# Patient Record
Sex: MALE | Race: BLACK OR AFRICAN AMERICAN | NOT HISPANIC OR LATINO | Employment: OTHER | URBAN - METROPOLITAN AREA
[De-identification: names, ages, dates, MRNs, and addresses within clinical notes are randomized per-mention and may not be internally consistent; named-entity substitution may affect disease eponyms.]

---

## 2017-05-11 ENCOUNTER — TRANSCRIBE ORDERS (OUTPATIENT)
Dept: LAB | Facility: CLINIC | Age: 27
End: 2017-05-11

## 2017-05-11 ENCOUNTER — APPOINTMENT (OUTPATIENT)
Dept: LAB | Facility: CLINIC | Age: 27
End: 2017-05-11
Payer: COMMERCIAL

## 2017-05-11 DIAGNOSIS — Z79.899 ENCOUNTER FOR LONG-TERM (CURRENT) USE OF OTHER MEDICATIONS: ICD-10-CM

## 2017-05-11 DIAGNOSIS — Z79.899 ENCOUNTER FOR LONG-TERM (CURRENT) USE OF OTHER MEDICATIONS: Primary | ICD-10-CM

## 2017-05-11 LAB
ALBUMIN SERPL BCP-MCNC: 3.9 G/DL (ref 3.5–5)
ALP SERPL-CCNC: 89 U/L (ref 46–116)
ALT SERPL W P-5'-P-CCNC: 16 U/L (ref 12–78)
ANION GAP SERPL CALCULATED.3IONS-SCNC: 5 MMOL/L (ref 4–13)
AST SERPL W P-5'-P-CCNC: 14 U/L (ref 5–45)
BASOPHILS # BLD AUTO: 0.01 THOUSANDS/ΜL (ref 0–0.1)
BASOPHILS NFR BLD AUTO: 0 % (ref 0–1)
BILIRUB SERPL-MCNC: 0.54 MG/DL (ref 0.2–1)
BUN SERPL-MCNC: 6 MG/DL (ref 5–25)
CALCIUM SERPL-MCNC: 9.3 MG/DL (ref 8.3–10.1)
CHLORIDE SERPL-SCNC: 102 MMOL/L (ref 100–108)
CHOLEST SERPL-MCNC: 146 MG/DL (ref 50–200)
CO2 SERPL-SCNC: 33 MMOL/L (ref 21–32)
CREAT SERPL-MCNC: 1 MG/DL (ref 0.6–1.3)
EOSINOPHIL # BLD AUTO: 0.22 THOUSAND/ΜL (ref 0–0.61)
EOSINOPHIL NFR BLD AUTO: 4 % (ref 0–6)
ERYTHROCYTE [DISTWIDTH] IN BLOOD BY AUTOMATED COUNT: 12.8 % (ref 11.6–15.1)
GFR SERPL CREATININE-BSD FRML MDRD: >60 ML/MIN/1.73SQ M
GLUCOSE P FAST SERPL-MCNC: 80 MG/DL (ref 65–99)
HCT VFR BLD AUTO: 48 % (ref 36.5–49.3)
HDLC SERPL-MCNC: 49 MG/DL (ref 40–60)
HGB BLD-MCNC: 17.2 G/DL (ref 12–17)
LDLC SERPL CALC-MCNC: 77 MG/DL (ref 0–100)
LYMPHOCYTES # BLD AUTO: 2.76 THOUSANDS/ΜL (ref 0.6–4.47)
LYMPHOCYTES NFR BLD AUTO: 53 % (ref 14–44)
MCH RBC QN AUTO: 32.1 PG (ref 26.8–34.3)
MCHC RBC AUTO-ENTMCNC: 35.8 G/DL (ref 31.4–37.4)
MCV RBC AUTO: 90 FL (ref 82–98)
MONOCYTES # BLD AUTO: 0.68 THOUSAND/ΜL (ref 0.17–1.22)
MONOCYTES NFR BLD AUTO: 13 % (ref 4–12)
NEUTROPHILS # BLD AUTO: 1.55 THOUSANDS/ΜL (ref 1.85–7.62)
NEUTS SEG NFR BLD AUTO: 30 % (ref 43–75)
NRBC BLD AUTO-RTO: 0 /100 WBCS
PLATELET # BLD AUTO: 203 THOUSANDS/UL (ref 149–390)
PMV BLD AUTO: 10.8 FL (ref 8.9–12.7)
POTASSIUM SERPL-SCNC: 4.8 MMOL/L (ref 3.5–5.3)
PROT SERPL-MCNC: 7.6 G/DL (ref 6.4–8.2)
RBC # BLD AUTO: 5.35 MILLION/UL (ref 3.88–5.62)
SODIUM SERPL-SCNC: 140 MMOL/L (ref 136–145)
T4 SERPL-MCNC: 7.6 UG/DL (ref 4.7–13.3)
TRIGL SERPL-MCNC: 98 MG/DL
TSH SERPL DL<=0.05 MIU/L-ACNC: 1.12 UIU/ML (ref 0.36–3.74)
VALPROATE SERPL-MCNC: 84 UG/ML (ref 50–100)
WBC # BLD AUTO: 5.23 THOUSAND/UL (ref 4.31–10.16)

## 2017-05-11 PROCEDURE — 80061 LIPID PANEL: CPT

## 2017-05-11 PROCEDURE — 85025 COMPLETE CBC W/AUTO DIFF WBC: CPT

## 2017-05-11 PROCEDURE — 36415 COLL VENOUS BLD VENIPUNCTURE: CPT

## 2017-05-11 PROCEDURE — 84436 ASSAY OF TOTAL THYROXINE: CPT

## 2017-05-11 PROCEDURE — 84443 ASSAY THYROID STIM HORMONE: CPT

## 2017-05-11 PROCEDURE — 80053 COMPREHEN METABOLIC PANEL: CPT

## 2017-05-11 PROCEDURE — 80164 ASSAY DIPROPYLACETIC ACD TOT: CPT

## 2017-10-03 ENCOUNTER — APPOINTMENT (OUTPATIENT)
Dept: LAB | Facility: CLINIC | Age: 27
End: 2017-10-03
Payer: COMMERCIAL

## 2017-10-03 ENCOUNTER — TRANSCRIBE ORDERS (OUTPATIENT)
Dept: LAB | Facility: CLINIC | Age: 27
End: 2017-10-03

## 2017-10-03 DIAGNOSIS — Z79.899 ENCOUNTER FOR LONG-TERM (CURRENT) USE OF OTHER MEDICATIONS: ICD-10-CM

## 2017-10-03 DIAGNOSIS — Z79.899 ENCOUNTER FOR LONG-TERM (CURRENT) USE OF OTHER MEDICATIONS: Primary | ICD-10-CM

## 2017-10-03 LAB
ALBUMIN SERPL BCP-MCNC: 3.9 G/DL (ref 3.5–5)
ALP SERPL-CCNC: 85 U/L (ref 46–116)
ALT SERPL W P-5'-P-CCNC: 11 U/L (ref 12–78)
ANION GAP SERPL CALCULATED.3IONS-SCNC: 5 MMOL/L (ref 4–13)
AST SERPL W P-5'-P-CCNC: 12 U/L (ref 5–45)
BASOPHILS # BLD AUTO: 0.01 THOUSANDS/ΜL (ref 0–0.1)
BASOPHILS NFR BLD AUTO: 0 % (ref 0–1)
BILIRUB SERPL-MCNC: 0.5 MG/DL (ref 0.2–1)
BUN SERPL-MCNC: 6 MG/DL (ref 5–25)
CALCIUM SERPL-MCNC: 9.5 MG/DL (ref 8.3–10.1)
CHLORIDE SERPL-SCNC: 101 MMOL/L (ref 100–108)
CO2 SERPL-SCNC: 32 MMOL/L (ref 21–32)
CREAT SERPL-MCNC: 0.94 MG/DL (ref 0.6–1.3)
EOSINOPHIL # BLD AUTO: 0.1 THOUSAND/ΜL (ref 0–0.61)
EOSINOPHIL NFR BLD AUTO: 2 % (ref 0–6)
ERYTHROCYTE [DISTWIDTH] IN BLOOD BY AUTOMATED COUNT: 12.5 % (ref 11.6–15.1)
GFR SERPL CREATININE-BSD FRML MDRD: 127 ML/MIN/1.73SQ M
GLUCOSE P FAST SERPL-MCNC: 82 MG/DL (ref 65–99)
HCT VFR BLD AUTO: 47.6 % (ref 36.5–49.3)
HGB BLD-MCNC: 17.4 G/DL (ref 12–17)
LYMPHOCYTES # BLD AUTO: 2.27 THOUSANDS/ΜL (ref 0.6–4.47)
LYMPHOCYTES NFR BLD AUTO: 52 % (ref 14–44)
MCH RBC QN AUTO: 32.6 PG (ref 26.8–34.3)
MCHC RBC AUTO-ENTMCNC: 36.6 G/DL (ref 31.4–37.4)
MCV RBC AUTO: 89 FL (ref 82–98)
MONOCYTES # BLD AUTO: 0.49 THOUSAND/ΜL (ref 0.17–1.22)
MONOCYTES NFR BLD AUTO: 11 % (ref 4–12)
NEUTROPHILS # BLD AUTO: 1.56 THOUSANDS/ΜL (ref 1.85–7.62)
NEUTS SEG NFR BLD AUTO: 35 % (ref 43–75)
NRBC BLD AUTO-RTO: 0 /100 WBCS
PLATELET # BLD AUTO: 229 THOUSANDS/UL (ref 149–390)
PMV BLD AUTO: 11.6 FL (ref 8.9–12.7)
POTASSIUM SERPL-SCNC: 4.3 MMOL/L (ref 3.5–5.3)
PROT SERPL-MCNC: 7.7 G/DL (ref 6.4–8.2)
RBC # BLD AUTO: 5.34 MILLION/UL (ref 3.88–5.62)
SODIUM SERPL-SCNC: 138 MMOL/L (ref 136–145)
VALPROATE SERPL-MCNC: 95 UG/ML (ref 50–100)
WBC # BLD AUTO: 4.44 THOUSAND/UL (ref 4.31–10.16)

## 2017-10-03 PROCEDURE — 80164 ASSAY DIPROPYLACETIC ACD TOT: CPT

## 2017-10-03 PROCEDURE — 80053 COMPREHEN METABOLIC PANEL: CPT

## 2017-10-03 PROCEDURE — 85025 COMPLETE CBC W/AUTO DIFF WBC: CPT

## 2017-10-03 PROCEDURE — 36415 COLL VENOUS BLD VENIPUNCTURE: CPT

## 2017-11-02 ENCOUNTER — TRANSCRIBE ORDERS (OUTPATIENT)
Dept: LAB | Facility: CLINIC | Age: 27
End: 2017-11-02

## 2017-11-02 ENCOUNTER — APPOINTMENT (OUTPATIENT)
Dept: LAB | Facility: CLINIC | Age: 27
End: 2017-11-02
Payer: COMMERCIAL

## 2017-11-02 DIAGNOSIS — Z79.899 ENCOUNTER FOR LONG-TERM (CURRENT) USE OF MEDICATIONS: Primary | ICD-10-CM

## 2017-11-02 DIAGNOSIS — R53.83 TIRED: Primary | ICD-10-CM

## 2017-11-02 DIAGNOSIS — R53.83 TIRED: ICD-10-CM

## 2017-11-02 LAB
ALBUMIN SERPL BCP-MCNC: 3.7 G/DL (ref 3.5–5)
ALP SERPL-CCNC: 83 U/L (ref 46–116)
ALT SERPL W P-5'-P-CCNC: 16 U/L (ref 12–78)
ANION GAP SERPL CALCULATED.3IONS-SCNC: 3 MMOL/L (ref 4–13)
AST SERPL W P-5'-P-CCNC: 14 U/L (ref 5–45)
BASOPHILS # BLD AUTO: 0.01 THOUSANDS/ΜL (ref 0–0.1)
BASOPHILS NFR BLD AUTO: 0 % (ref 0–1)
BILIRUB SERPL-MCNC: 0.3 MG/DL (ref 0.2–1)
BUN SERPL-MCNC: 5 MG/DL (ref 5–25)
CALCIUM SERPL-MCNC: 9.7 MG/DL (ref 8.3–10.1)
CHLORIDE SERPL-SCNC: 104 MMOL/L (ref 100–108)
CHOLEST SERPL-MCNC: 146 MG/DL (ref 50–200)
CO2 SERPL-SCNC: 34 MMOL/L (ref 21–32)
CREAT SERPL-MCNC: 0.96 MG/DL (ref 0.6–1.3)
EOSINOPHIL # BLD AUTO: 0.22 THOUSAND/ΜL (ref 0–0.61)
EOSINOPHIL NFR BLD AUTO: 4 % (ref 0–6)
ERYTHROCYTE [DISTWIDTH] IN BLOOD BY AUTOMATED COUNT: 12.4 % (ref 11.6–15.1)
GFR SERPL CREATININE-BSD FRML MDRD: 124 ML/MIN/1.73SQ M
GLUCOSE P FAST SERPL-MCNC: 97 MG/DL (ref 65–99)
HCT VFR BLD AUTO: 47.1 % (ref 36.5–49.3)
HDLC SERPL-MCNC: 51 MG/DL (ref 40–60)
HGB BLD-MCNC: 16.7 G/DL (ref 12–17)
LDLC SERPL CALC-MCNC: 69 MG/DL (ref 0–100)
LYMPHOCYTES # BLD AUTO: 2.84 THOUSANDS/ΜL (ref 0.6–4.47)
LYMPHOCYTES NFR BLD AUTO: 48 % (ref 14–44)
MCH RBC QN AUTO: 31.7 PG (ref 26.8–34.3)
MCHC RBC AUTO-ENTMCNC: 35.5 G/DL (ref 31.4–37.4)
MCV RBC AUTO: 90 FL (ref 82–98)
MONOCYTES # BLD AUTO: 0.72 THOUSAND/ΜL (ref 0.17–1.22)
MONOCYTES NFR BLD AUTO: 12 % (ref 4–12)
NEUTROPHILS # BLD AUTO: 2.1 THOUSANDS/ΜL (ref 1.85–7.62)
NEUTS SEG NFR BLD AUTO: 36 % (ref 43–75)
NRBC BLD AUTO-RTO: 0 /100 WBCS
PLATELET # BLD AUTO: 219 THOUSANDS/UL (ref 149–390)
PMV BLD AUTO: 10.8 FL (ref 8.9–12.7)
POTASSIUM SERPL-SCNC: 4.2 MMOL/L (ref 3.5–5.3)
PROT SERPL-MCNC: 7.3 G/DL (ref 6.4–8.2)
RBC # BLD AUTO: 5.26 MILLION/UL (ref 3.88–5.62)
SODIUM SERPL-SCNC: 141 MMOL/L (ref 136–145)
TRIGL SERPL-MCNC: 129 MG/DL
TSH SERPL DL<=0.05 MIU/L-ACNC: 1.2 UIU/ML (ref 0.36–3.74)
WBC # BLD AUTO: 5.9 THOUSAND/UL (ref 4.31–10.16)

## 2017-11-02 PROCEDURE — 80061 LIPID PANEL: CPT

## 2017-11-02 PROCEDURE — 84443 ASSAY THYROID STIM HORMONE: CPT

## 2017-11-02 PROCEDURE — 80053 COMPREHEN METABOLIC PANEL: CPT

## 2017-11-02 PROCEDURE — 85025 COMPLETE CBC W/AUTO DIFF WBC: CPT

## 2017-11-02 PROCEDURE — 36415 COLL VENOUS BLD VENIPUNCTURE: CPT

## 2017-12-19 ENCOUNTER — HOSPITAL ENCOUNTER (EMERGENCY)
Facility: HOSPITAL | Age: 27
Discharge: HOME | End: 2017-12-19
Attending: EMERGENCY MEDICINE
Payer: COMMERCIAL

## 2017-12-19 VITALS
RESPIRATION RATE: 18 BRPM | WEIGHT: 165 LBS | SYSTOLIC BLOOD PRESSURE: 145 MMHG | OXYGEN SATURATION: 96 % | HEART RATE: 97 BPM | TEMPERATURE: 98.3 F | DIASTOLIC BLOOD PRESSURE: 77 MMHG
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
## 2017-12-19 DIAGNOSIS — K62.89 RECTAL PAIN: ICD-10-CM

## 2017-12-19 LAB
ALBUMIN SERPL BCP-MCNC: 4.2 G/DL (ref 3.5–5)
ALP SERPL-CCNC: 98 U/L (ref 46–116)
ALT SERPL W P-5'-P-CCNC: 23 U/L (ref 12–78)
ANION GAP SERPL CALCULATED.3IONS-SCNC: 10 MMOL/L (ref 4–13)
AST SERPL W P-5'-P-CCNC: 24 U/L (ref 5–45)
BASOPHILS # BLD AUTO: 0.02 THOUSANDS/ΜL (ref 0–0.1)
BASOPHILS NFR BLD AUTO: 0 % (ref 0–1)
BILIRUB SERPL-MCNC: 0.43 MG/DL (ref 0.2–1)
BUN SERPL-MCNC: 14 MG/DL (ref 5–25)
CALCIUM SERPL-MCNC: 9.8 MG/DL (ref 8.3–10.1)
CHLORIDE SERPL-SCNC: 101 MMOL/L (ref 100–108)
CO2 SERPL-SCNC: 27 MMOL/L (ref 21–32)
CREAT SERPL-MCNC: 1.1 MG/DL (ref 0.6–1.3)
EOSINOPHIL # BLD AUTO: 0.11 THOUSAND/ΜL (ref 0–0.61)
EOSINOPHIL NFR BLD AUTO: 1 % (ref 0–6)
ERYTHROCYTE [DISTWIDTH] IN BLOOD BY AUTOMATED COUNT: 12.5 % (ref 11.6–15.1)
GFR SERPL CREATININE-BSD FRML MDRD: 105 ML/MIN/1.73SQ M
GLUCOSE SERPL-MCNC: 111 MG/DL (ref 65–140)
HCT VFR BLD AUTO: 46.2 % (ref 36.5–49.3)
HGB BLD-MCNC: 16.8 G/DL (ref 12–17)
HIV 1+2 AB+HIV1 P24 AG SERPL QL IA: NORMAL
HIV1 P24 AG SER QL: NORMAL
LYMPHOCYTES # BLD AUTO: 4.42 THOUSANDS/ΜL (ref 0.6–4.47)
LYMPHOCYTES NFR BLD AUTO: 52 % (ref 14–44)
MCH RBC QN AUTO: 32.4 PG (ref 26.8–34.3)
MCHC RBC AUTO-ENTMCNC: 36.4 G/DL (ref 31.4–37.4)
MCV RBC AUTO: 89 FL (ref 82–98)
MONOCYTES # BLD AUTO: 0.93 THOUSAND/ΜL (ref 0.17–1.22)
MONOCYTES NFR BLD AUTO: 11 % (ref 4–12)
NEUTROPHILS # BLD AUTO: 3.12 THOUSANDS/ΜL (ref 1.85–7.62)
NEUTS SEG NFR BLD AUTO: 36 % (ref 43–75)
NRBC BLD AUTO-RTO: 0 /100 WBCS
PLATELET # BLD AUTO: 251 THOUSANDS/UL (ref 149–390)
PMV BLD AUTO: 10.3 FL (ref 8.9–12.7)
POTASSIUM SERPL-SCNC: 4.3 MMOL/L (ref 3.5–5.3)
PROT SERPL-MCNC: 7.9 G/DL (ref 6.4–8.2)
RBC # BLD AUTO: 5.19 MILLION/UL (ref 3.88–5.62)
SODIUM SERPL-SCNC: 138 MMOL/L (ref 136–145)
WBC # BLD AUTO: 8.61 THOUSAND/UL (ref 4.31–10.16)

## 2017-12-19 PROCEDURE — 99284 EMERGENCY DEPT VISIT MOD MDM: CPT

## 2017-12-19 PROCEDURE — 36415 COLL VENOUS BLD VENIPUNCTURE: CPT | Performed by: EMERGENCY MEDICINE

## 2017-12-19 PROCEDURE — 86592 SYPHILIS TEST NON-TREP QUAL: CPT | Performed by: EMERGENCY MEDICINE

## 2017-12-19 PROCEDURE — 87591 N.GONORRHOEAE DNA AMP PROB: CPT | Performed by: EMERGENCY MEDICINE

## 2017-12-19 PROCEDURE — 80053 COMPREHEN METABOLIC PANEL: CPT | Performed by: EMERGENCY MEDICINE

## 2017-12-19 PROCEDURE — 87806 HIV AG W/HIV1&2 ANTB W/OPTIC: CPT | Performed by: EMERGENCY MEDICINE

## 2017-12-19 PROCEDURE — 87491 CHLMYD TRACH DNA AMP PROBE: CPT | Performed by: EMERGENCY MEDICINE

## 2017-12-19 PROCEDURE — 85025 COMPLETE CBC W/AUTO DIFF WBC: CPT | Performed by: EMERGENCY MEDICINE

## 2017-12-20 LAB
CHLAMYDIA DNA CVX QL NAA+PROBE: NORMAL
N GONORRHOEA DNA GENITAL QL NAA+PROBE: NORMAL
RPR SER QL: NORMAL
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
## 2018-01-01 NOTE — DISCHARGE INSTRUCTIONS
Rectal Pain   WHAT YOU NEED TO KNOW:   Rectal pain can be caused by a number of conditions, such as hemorrhoids, an abscess, trauma, or anal tear  Infection, muscle spasms, or anal intercourse can also cause rectal pain  DISCHARGE INSTRUCTIONS:   Medicines: You may need any of the following:  · NSAIDs , such as ibuprofen, help decrease swelling, pain, and fever  This medicine is available with or without a doctor's order  NSAIDs can cause stomach bleeding or kidney problems in certain people  If you take blood thinner medicine, always ask your healthcare provider if NSAIDs are safe for you  Always read the medicine label and follow directions  · Prescription pain medicine  may be given  Ask how to take this medicine safely  · Antibiotics  help treat or prevent a bacterial infection  · Bowel movement softeners  help soften your bowel movement  They help prevent straining and more damage to the area  · Take your medicine as directed  Contact your healthcare provider if you think your medicine is not helping or if you have side effects  Tell him or her if you are allergic to any medicine  Keep a list of the medicines, vitamins, and herbs you take  Include the amounts, and when and why you take them  Bring the list or the pill bottles to follow-up visits  Carry your medicine list with you in case of an emergency  Return to the emergency department if:   · You have severe pain  Contact your healthcare provider if:   · Your pain does not decrease after 1 to 2 days of treatment  · You cannot take the medicine prescribed for your condition  · You have questions or concerns about your condition or care  Take a sitz bath:  Fill a bathtub with 4 to 6 inches of warm water  You may also use a sitz bath pan that fits over a toilet  Sit in the sitz bath for 20 minutes  Do this 2 to 3 times a day, or as directed  The warm water can help decrease pain, muscle spasms, or swelling     Apply heat:  Apply a warm, moist compress on your anus for 20 to 30 minutes every 2 hours for as many days as directed  Heat helps decrease pain and muscle spasms  Eat high-fiber foods: This will help prevent constipation and soften your bowel movements  High-fiber foods include fruit, vegetables, whole-grain breads and cereals, and beans  A dietitian or healthcare provider can help you create a high-fiber meal plan  Drink liquids as directed: You may need to drink more liquid than usual to help soften your bowel movements  Ask how much liquid to drink each day and which liquids are best for you  Follow up with your healthcare provider as directed:  Write down your questions so you remember to ask them during your visits  © 2017 [facility] Information is for End User's use only and may not be sold, redistributed or otherwise used for commercial purposes  All illustrations and images included in CareNotes® are the copyrighted property of [employer]  or [employer]  The above information is an  only  It is not intended as medical advice for individual conditions or treatments  Talk to your doctor, nurse or pharmacist before following any medical regimen to see if it is safe and effective for you  normal (ped)...

## 2018-01-09 ENCOUNTER — APPOINTMENT (OUTPATIENT)
Dept: LAB | Facility: CLINIC | Age: 28
End: 2018-01-09
Payer: COMMERCIAL

## 2018-01-09 ENCOUNTER — TRANSCRIBE ORDERS (OUTPATIENT)
Dept: LAB | Facility: CLINIC | Age: 28
End: 2018-01-09

## 2018-01-09 DIAGNOSIS — Z79.899 NEED FOR PROPHYLACTIC CHEMOTHERAPY: Primary | ICD-10-CM

## 2018-01-09 LAB
ALBUMIN SERPL BCP-MCNC: 3.7 G/DL (ref 3.5–5)
ALP SERPL-CCNC: 95 U/L (ref 46–116)
ALT SERPL W P-5'-P-CCNC: 21 U/L (ref 12–78)
ANION GAP SERPL CALCULATED.3IONS-SCNC: 5 MMOL/L (ref 4–13)
AST SERPL W P-5'-P-CCNC: 15 U/L (ref 5–45)
BASOPHILS # BLD AUTO: 0.01 THOUSANDS/ΜL (ref 0–0.1)
BASOPHILS NFR BLD AUTO: 0 % (ref 0–1)
BILIRUB SERPL-MCNC: 0.44 MG/DL (ref 0.2–1)
BUN SERPL-MCNC: 11 MG/DL (ref 5–25)
CALCIUM SERPL-MCNC: 9.4 MG/DL (ref 8.3–10.1)
CHLORIDE SERPL-SCNC: 103 MMOL/L (ref 100–108)
CO2 SERPL-SCNC: 32 MMOL/L (ref 21–32)
CREAT SERPL-MCNC: 0.98 MG/DL (ref 0.6–1.3)
EOSINOPHIL # BLD AUTO: 0.17 THOUSAND/ΜL (ref 0–0.61)
EOSINOPHIL NFR BLD AUTO: 3 % (ref 0–6)
ERYTHROCYTE [DISTWIDTH] IN BLOOD BY AUTOMATED COUNT: 12.5 % (ref 11.6–15.1)
GFR SERPL CREATININE-BSD FRML MDRD: 121 ML/MIN/1.73SQ M
GLUCOSE P FAST SERPL-MCNC: 104 MG/DL (ref 65–99)
HCT VFR BLD AUTO: 45.1 % (ref 36.5–49.3)
HGB BLD-MCNC: 16.9 G/DL (ref 12–17)
LYMPHOCYTES # BLD AUTO: 2.48 THOUSANDS/ΜL (ref 0.6–4.47)
LYMPHOCYTES NFR BLD AUTO: 46 % (ref 14–44)
MCH RBC QN AUTO: 32.9 PG (ref 26.8–34.3)
MCHC RBC AUTO-ENTMCNC: 37.5 G/DL (ref 31.4–37.4)
MCV RBC AUTO: 88 FL (ref 82–98)
MONOCYTES # BLD AUTO: 0.5 THOUSAND/ΜL (ref 0.17–1.22)
MONOCYTES NFR BLD AUTO: 9 % (ref 4–12)
NEUTROPHILS # BLD AUTO: 2.25 THOUSANDS/ΜL (ref 1.85–7.62)
NEUTS SEG NFR BLD AUTO: 42 % (ref 43–75)
NRBC BLD AUTO-RTO: 0 /100 WBCS
PLATELET # BLD AUTO: 263 THOUSANDS/UL (ref 149–390)
PMV BLD AUTO: 10.8 FL (ref 8.9–12.7)
POTASSIUM SERPL-SCNC: 4.1 MMOL/L (ref 3.5–5.3)
PROT SERPL-MCNC: 7.4 G/DL (ref 6.4–8.2)
RBC # BLD AUTO: 5.14 MILLION/UL (ref 3.88–5.62)
SODIUM SERPL-SCNC: 140 MMOL/L (ref 136–145)
VALPROATE SERPL-MCNC: 82 UG/ML (ref 50–100)
WBC # BLD AUTO: 5.42 THOUSAND/UL (ref 4.31–10.16)

## 2018-01-09 PROCEDURE — 80164 ASSAY DIPROPYLACETIC ACD TOT: CPT

## 2018-01-09 PROCEDURE — 36415 COLL VENOUS BLD VENIPUNCTURE: CPT

## 2018-01-09 PROCEDURE — 85025 COMPLETE CBC W/AUTO DIFF WBC: CPT

## 2018-01-09 PROCEDURE — 80053 COMPREHEN METABOLIC PANEL: CPT

## 2018-03-08 ENCOUNTER — HOSPITAL ENCOUNTER (EMERGENCY)
Facility: HOSPITAL | Age: 28
Discharge: HOME | End: 2018-03-09
Attending: EMERGENCY MEDICINE
Payer: COMMERCIAL

## 2018-03-08 DIAGNOSIS — R45.4 ANGER REACTION: Primary | ICD-10-CM

## 2018-03-09 VITALS
BODY MASS INDEX: 23.71 KG/M2 | RESPIRATION RATE: 16 BRPM | TEMPERATURE: 97.8 F | HEART RATE: 69 BPM | WEIGHT: 170 LBS | DIASTOLIC BLOOD PRESSURE: 87 MMHG | SYSTOLIC BLOOD PRESSURE: 159 MMHG

## 2018-03-09 LAB
AMPHETAMINES SERPL QL SCN: NEGATIVE
BARBITURATES UR QL: NEGATIVE
BENZODIAZ UR QL: NEGATIVE
COCAINE UR QL: NEGATIVE
ETHANOL EXG-MCNC: 0 MG/DL
METHADONE UR QL: NEGATIVE
OPIATES UR QL SCN: NEGATIVE
PCP UR QL: NEGATIVE
THC UR QL: NEGATIVE

## 2018-03-09 PROCEDURE — 80307 DRUG TEST PRSMV CHEM ANLYZR: CPT | Performed by: EMERGENCY MEDICINE

## 2018-03-09 PROCEDURE — 82075 ASSAY OF BREATH ETHANOL: CPT | Performed by: EMERGENCY MEDICINE

## 2018-03-09 PROCEDURE — 99285 EMERGENCY DEPT VISIT HI MDM: CPT
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
## 2018-03-09 NOTE — DISCHARGE INSTRUCTIONS
You were given resources for anger management  Return to the ED if you develop thoughts of hurting yourself or others  Mood Disorders   WHAT YOU NEED TO KNOW:   A mood disorder, or affective disorder, is a condition that causes your mood or emotions to be out of control  Your mood can affect your personality and how you act  It can also affect how you feel about yourself and life in general   DISCHARGE INSTRUCTIONS:   Medicines:   · Medicines  can help control your moods  · Take your medicine as directed  Contact your healthcare provider if you think your medicine is not helping or if you have side effects  Tell him of her if you are allergic to any medicine  Keep a list of the medicines, vitamins, and herbs you take  Include the amounts, and when and why you take them  Bring the list or the pill bottles to follow-up visits  Carry your medicine list with you in case of an emergency  Follow up with your healthcare provider as directed: You may need to return for regular visits or blood tests  Write down your questions so you remember to ask them during your visits  Self-care:   · Try to get 6 to 8 hours of sleep each night  Contact your healthcare provider if you have trouble sleeping  · Manage your stress  Learn new ways to relax, such as deep breathing or meditation  · Talk to someone about how you feel  Join a support group  Talk to your healthcare provider, family, or friends about your feelings  Tell them about things that upset you  · Exercise regularly  Ask about the best exercise plan for you  Most healthcare providers recommend 30 minutes each day, 5 days a week  Exercise helps to lower stress and manage your moods  Contact your healthcare provider if:   · You are depressed  · You feel anxious or worried  · You begin to drink alcohol, or you drink more than usual     · You take illegal drugs  · You take medicines that are not prescribed to you      · Your medicine causes you to feel drowsy, keeps you awake, or affects how much you eat  · You have questions or concerns about your condition or care  Return to the emergency department if:   · You have severe depression  · You want to hurt yourself or others  © 2017 [address] [employer] Information is for End User's use only and may not be sold, redistributed or otherwise used for commercial purposes  All illustrations and images included in CareNotes® are the copyrighted property of [employer]  or [employer]  The above information is an  only  It is not intended as medical advice for individual conditions or treatments  Talk to your doctor, nurse or pharmacist before following any medical regimen to see if it is safe and effective for you
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
## 2018-03-15 ENCOUNTER — HOSPITAL ENCOUNTER (EMERGENCY)
Facility: HOSPITAL | Age: 28
Discharge: HOME | End: 2018-03-15
Admitting: EMERGENCY MEDICINE
Payer: COMMERCIAL

## 2018-03-15 ENCOUNTER — APPOINTMENT (EMERGENCY)
Dept: RADIOLOGY | Facility: HOSPITAL | Age: 28
End: 2018-03-15
Payer: COMMERCIAL

## 2018-03-15 VITALS
WEIGHT: 165 LBS | BODY MASS INDEX: 23.01 KG/M2 | SYSTOLIC BLOOD PRESSURE: 150 MMHG | DIASTOLIC BLOOD PRESSURE: 94 MMHG | HEART RATE: 82 BPM | OXYGEN SATURATION: 98 % | RESPIRATION RATE: 18 BRPM | TEMPERATURE: 97 F

## 2018-03-15 DIAGNOSIS — L03.90 CELLULITIS: ICD-10-CM

## 2018-03-15 DIAGNOSIS — S63.690A: Primary | ICD-10-CM

## 2018-03-15 PROCEDURE — 73140 X-RAY EXAM OF FINGER(S): CPT

## 2018-03-15 PROCEDURE — 99283 EMERGENCY DEPT VISIT LOW MDM: CPT

## 2018-03-15 RX ORDER — CEPHALEXIN 250 MG/1
CAPSULE ORAL
Qty: 40 CAPSULE | Refills: 0 | Status: SHIPPED | OUTPATIENT
Start: 2018-03-15 | End: 2018-03-25

## 2018-03-15 NOTE — DISCHARGE INSTRUCTIONS
Cellulitis   WHAT YOU NEED TO KNOW:   Cellulitis is a skin infection caused by bacteria  Cellulitis may go away on its own or you may need treatment  Your healthcare provider may draw a [place] around the outside edges of your cellulitis  If your cellulitis spreads, your healthcare provider will see it outside of the [place]  DISCHARGE INSTRUCTIONS:   Call 911 if:   · You have sudden trouble breathing or chest pain  Seek care immediately if:   · Your wound gets larger and more painful  · You feel a crackling under your skin when you touch it  · You have purple dots or bumps on your skin, or you see bleeding under your skin  · You have new swelling and pain in your legs  · The red, warm, swollen area gets larger  · You see red streaks coming from the infected area  Contact your healthcare provider if:   · You have a fever  · Your fever or pain does not go away or gets worse  · The area does not get smaller after 2 days of antibiotics  · Your skin is flaking or peeling off  · You have questions or concerns about your condition or care  Medicines:   · Antibiotics  help treat the bacterial infection  · NSAIDs , such as ibuprofen, help decrease swelling, pain, and fever  NSAIDs can cause stomach bleeding or kidney problems in certain people  If you take blood thinner medicine, always ask if NSAIDs are safe for you  Always read the medicine label and follow directions  Do not give these medicines to children under 10months of age without direction from your child's healthcare provider  · Acetaminophen  decreases pain and fever  It is available without a doctor's order  Ask how much to take and how often to take it  Follow directions  Read the labels of all other medicines you are using to see if they also contain acetaminophen, or ask your doctor or pharmacist  Acetaminophen can cause liver damage if not taken correctly   Do not use more than 4 grams (4,000 milligrams) total of acetaminophen in one day  · Take your medicine as directed  Contact your healthcare provider if you think your medicine is not helping or if you have side effects  Tell him or her if you are allergic to any medicine  Keep a list of the medicines, vitamins, and herbs you take  Include the amounts, and when and why you take them  Bring the list or the pill bottles to follow-up visits  Carry your medicine list with you in case of an emergency  Self-care:   · Elevate the area above the level of your heart  as often as you can  This will help decrease swelling and pain  Prop the area on pillows or blankets to keep it elevated comfortably  · Clean the area daily until the wound scabs over  Gently wash the area with soap and water  Pat dry  Use dressings as directed  · Place cool or warm, wet cloths on the area as directed  Use clean cloths and clean water  Leave it on the area until the cloth is room temperature  Pat the area dry with a clean, dry cloth  The cloths may help decrease pain  Prevent cellulitis:   · Do not scratch bug bites or areas of injury  You increase your risk for cellulitis by scratching these areas  · Do not share personal items, such as towels, clothing, and razors  · Clean exercise equipment  with germ-killing  before and after you use it  · Wash your hands often  Use soap and water  Wash your hands after you use the bathroom, change a child's diapers, or sneeze  Wash your hands before you prepare or eat food  Use lotion to prevent dry, cracked skin  · Wear pressure stockings as directed  You may be told to wear the stockings if you have peripheral edema  The stockings improve blood flow and decrease swelling  · Treat athlete's foot  This can help prevent the spread of a bacterial skin infection  Follow up with your healthcare provider within 3 days, or as directed: Your healthcare provider will check if your cellulitis is getting better   You may need different medicine  Write down your questions so you remember to ask them during your visits  © 2017 [address] Information is for End User's use only and may not be sold, redistributed or otherwise used for commercial purposes  All illustrations and images included in CareNotes® are the copyrighted property of [employer]  or [employer]  The above information is an  only  It is not intended as medical advice for individual conditions or treatments  Talk to your doctor, nurse or pharmacist before following any medical regimen to see if it is safe and effective for you

## 2018-03-24 ENCOUNTER — HOSPITAL ENCOUNTER (EMERGENCY)
Facility: HOSPITAL | Age: 28
Discharge: HOME | End: 2018-03-24
Attending: EMERGENCY MEDICINE | Admitting: EMERGENCY MEDICINE
Payer: COMMERCIAL

## 2018-03-24 VITALS
TEMPERATURE: 97.8 F | OXYGEN SATURATION: 96 % | WEIGHT: 170 LBS | DIASTOLIC BLOOD PRESSURE: 93 MMHG | SYSTOLIC BLOOD PRESSURE: 137 MMHG | HEART RATE: 94 BPM | RESPIRATION RATE: 18 BRPM | BODY MASS INDEX: 23.71 KG/M2

## 2018-03-24 DIAGNOSIS — R51.9 HEADACHE: Primary | ICD-10-CM

## 2018-03-24 PROCEDURE — 99283 EMERGENCY DEPT VISIT LOW MDM: CPT

## 2018-03-24 RX ORDER — ACETAMINOPHEN 325 MG/1
975 TABLET ORAL ONCE
Status: COMPLETED | OUTPATIENT
Start: 2018-03-24 | End: 2018-03-24

## 2018-03-24 RX ORDER — IBUPROFEN 600 MG/1
600 TABLET ORAL ONCE
Status: COMPLETED | OUTPATIENT
Start: 2018-03-24 | End: 2018-03-24

## 2018-03-24 RX ADMIN — ACETAMINOPHEN 975 MG: 325 TABLET, FILM COATED ORAL at 22:14

## 2018-03-24 RX ADMIN — IBUPROFEN 600 MG: 600 TABLET, FILM COATED ORAL at 22:14

## 2018-03-25 NOTE — ED PROVIDER NOTES
History  Chief Complaint   Patient presents with    Headache     pt c/o HA that has been going on for a couple days pt also c/o abd pain     19-year-old w/ history of mood disorder and depression presents for evaluation of a headache  Patient lives by himself but has a  within   Patient reportedly called EMS saying that he was suicidal   Upon arrival to ER patient denies being suicidal and said that he missed communicated and says that he just does not feel well and has a headache and a tummy ache  Abdominal pain is left lower quadrant  Denies any fevers, chills, nausea or vomiting  He has not tried taking medication  He has had similar headaches to this in the past             Prior to Admission Medications   Prescriptions Last Dose Informant Patient Reported? Taking? QUEtiapine (SEROquel) 200 mg tablet   Yes No   Sig: Take 200 mg by mouth  cephalexin (KEFLEX) 250 mg capsule   No No   Sig: One cap 4 times a day with food for 10 days   cloNIDine (CATAPRES) 0 1 mg tablet   Yes No   Sig: Take 0 1 mg by mouth  divalproex sodium (DEPAKOTE ER) 250 mg 24 hr tablet   Yes No   Sig: Take 250 mg by mouth  fluticasone (FLONASE) 50 mcg/act nasal spray   No No   Si spray into each nostril daily   pantoprazole (PROTONIX) 40 mg tablet   No No   Sig: Take 1 tablet (40 mg total) by mouth daily  venlafaxine (EFFEXOR-XR) 150 mg 24 hr capsule   Yes No   Sig: Take 150 mg by mouth  Facility-Administered Medications: None       Past Medical History:   Diagnosis Date    Asthma     Depression     Mood swings ([facility] [address] )        History reviewed  No pertinent surgical history  History reviewed  No pertinent family history  I have reviewed and agree with the history as documented      Social History   Substance Use Topics    Smoking status: Current Every Day Smoker     Packs/day: 0 20     Types: Cigarettes    Smokeless tobacco: Never Used    Alcohol use No        Review of Systems   Constitutional: Negative for chills and fever  HENT: Negative for congestion and sore throat  Eyes: Negative for pain and redness  Respiratory: Negative for shortness of breath and wheezing  Cardiovascular: Negative for chest pain and palpitations  Gastrointestinal: Positive for abdominal pain  Negative for diarrhea and vomiting  Endocrine: Negative for polydipsia and polyphagia  Genitourinary: Negative for dysuria and flank pain  Musculoskeletal: Negative for arthralgias and back pain  Skin: Negative for rash and wound  Neurological: Positive for headaches  Negative for seizures  Psychiatric/Behavioral: Negative for agitation and behavioral problems  All other systems reviewed and are negative  Physical Exam  ED Triage Vitals [03/24/18 2207]   Temperature Pulse Respirations Blood Pressure SpO2   97 8 °F (36 6 °C) 94 18 137/93 96 %      Temp Source Heart Rate Source Patient Position - Orthostatic VS BP Location FiO2 (%)   Oral Monitor Lying Right arm --      Pain Score       --           Orthostatic Vital Signs  Vitals:    03/24/18 2207   BP: 137/93   Pulse: 94   Patient Position - Orthostatic VS: Lying       Physical Exam   Constitutional: He is oriented to person, place, and time  He appears well-developed and well-nourished  No distress  HENT:   Head: Normocephalic and atraumatic  Right Ear: External ear normal    Left Ear: External ear normal    Mouth/Throat: Oropharynx is clear and moist    Eyes: Conjunctivae and EOM are normal  Pupils are equal, round, and reactive to light  Neck: Normal range of motion  Neck supple  No thyromegaly present  Cardiovascular: Normal rate, regular rhythm and normal heart sounds  No murmur heard  Pulmonary/Chest: Breath sounds normal  No respiratory distress  He has no wheezes  Abdominal: Soft  He exhibits no distension  There is no tenderness  There is no rebound and no guarding  Musculoskeletal: Normal range of motion  He exhibits no deformity  Neurological: He is alert and oriented to person, place, and time  No cranial nerve deficit  Skin: Skin is warm  He is not diaphoretic  No erythema  Psychiatric: He has a normal mood and affect  His behavior is normal    Nursing note and vitals reviewed  ED Medications  Medications   acetaminophen (TYLENOL) tablet 975 mg (975 mg Oral Given 3/24/18 2214)   ibuprofen (MOTRIN) tablet 600 mg (600 mg Oral Given 3/24/18 2214)       Diagnostic Studies  Results Reviewed     None                 No orders to display         Procedures  Procedures      Phone Consults  ED Phone Contact    ED Course  ED Course as of Mar 24 2315   Sat Mar 24, 2018   2310 Patient reports that he feels much better like to go home  Again reiterates that he is not suicidal                                 MDM  Number of Diagnoses or Management Options  Headache:   Diagnosis management comments: Impression:  Well-appearing patient with very mild headache  Denies suicidal ideation  Plan:  Symptomatic treatment, likely discharge home    CritCare Time    Disposition  Final diagnoses:   Headache     Time reflects when diagnosis was documented in both MDM as applicable and the Disposition within this note     Time User Action Codes Description Comment    3/24/2018 11:11 PM Lyla Parker Add [R51] Headache       ED Disposition     ED Disposition Condition Comment    Discharge  Amye Brissa discharge to home/self care      Condition at discharge: Good        Follow-up Information     Follow up With Specialties Details Why Contact Info Additional [address], CRNP Nurse Practitioner Schedule an appointment as soon as possible for a visit  [address] [facility] [address]        [address] [place] [address] Emergency Department Emergency Medicine  As needed, If symptoms worsen [address]  [phone]  ED, [address] [place] [address], [id number]        Patient's Medications   Discharge Prescriptions    No medications on file     No discharge procedures on file  ED Provider  Attending physically available and evaluated Macie Brumfield I managed the patient along with the ED Attending      Electronically Signed by         Mati Fox MD  03/24/18 7051

## 2018-03-25 NOTE — DISCHARGE INSTRUCTIONS
Acute Headache, Ambulatory Care   GENERAL INFORMATION:   An acute headache  is pain or discomfort that starts suddenly and gets worse quickly  The cause of an acute headache may not be known  It may be triggered by stress, fatigue, hormones, food, or trauma  Common related symptoms include the following:   · Fever    · Sinus pressure    · Loss of memory    · Nausea or vomiting    · Problems with your vision, such as watery or red eyes, loss of vision, or pain in bright light    · Stiff neck    · Tenderness of the head and neck area    · Trouble staying awake, or being less alert than usual     · Weakness or less energy  Seek immediate care for the following symptoms:   · Severe pain    · A headache that occurs after a blow to the head, a fall, or other trauma     · Confusion or forgetfulness    · Numbness on one side of your face or body  Treatment for an acute headache  may include medicine to decrease pain  You may also need biofeedback or cognitive behavioral therapy  Ask your healthcare provider about these and other treatments for an acute headache  Manage my symptoms:   · Apply heat  on your head for 20 to 30 minutes every 2 hours for as many days as directed  Heat helps decrease pain and muscle spasms  You may alternate heat and ice  · Apply ice  on your head for 15 to 20 minutes every hour or as directed  Use an ice pack, or put crushed ice in a plastic bag  Cover it with a towel  Ice helps decrease pain  · Relax your muscles  Lie down in a comfortable position and close your eyes  Relax your muscles slowly  Start at your toes and work your way up your body  · Keep a record of your headaches  Write down when your headaches start and stop  Include your symptoms and what you were doing when the headache began  Record what you ate or drank for 24 hours before the headache started  Describe the pain and where it hurts  Keep track of what you did to treat your headache and whether it worked    Follow up with your healthcare provider as directed:  Bring your headache record with you when you see your healthcare provider  Write down your questions so you remember to ask them during your visits  CARE AGREEMENT:   You have the right to help plan your care  Learn about your health condition and how it may be treated  Discuss treatment options with your caregivers to decide what care you want to receive  You always have the right to refuse treatment  The above information is an  only  It is not intended as medical advice for individual conditions or treatments  Talk to your doctor, nurse or pharmacist before following any medical regimen to see if it is safe and effective for you  © 2014 [address] is for End User's use only and may not be sold, redistributed or otherwise used for commercial purposes  All illustrations and images included in CareNotes® are the copyrighted property of A D A M , Inc  or [employer]

## 2018-03-25 NOTE — ED ATTENDING ATTESTATION
Tyrone Quinones DO, saw and evaluated the patient  I have discussed the patient with the resident/non-physician practitioner and agree with the resident's/non-physician practitioner's findings, Plan of Care, and MDM as documented in the resident's/non-physician practitioner's note, except where noted  All available labs and Radiology studies were reviewed  At this point I agree with the current assessment done in the Emergency Department  I have conducted an independent evaluation of this patient including a focused history and a physical exam     ED Note - Juliet Gibbons 33 y o  male MRN: [id number]  Unit/Bed#: Z4HB Encounter: [id number]    History of Present Illness   HPI  Juliet Gibbons is a 33 y o  male who presents for evaluation of mild malaise and a non-specific headache  No neck pain  No focal neurological deficits  No fever chills  No concern for carbon monoxide exposure  Initial concern for SI when called 911 but adamantly denies SI or HI  States it was a misunderstanding that he meant tell him that he just was not feeling well  Patient does live at home but does have personal caretakers  REVIEW OF SYSTEMS  See HPI for further details  12 systems reviewed and otherwise negative except as noted  Historical Information     PAST MEDICAL HISTORY  Past Medical History:   Diagnosis Date    Asthma     Depression     Mood swings ([facility] [address] )        FAMILY HISTORY  History reviewed  No pertinent family history  SOCIAL HISTORY  Social History     Social History    Marital status: Single     Spouse name: N/A    Number of children: N/A    Years of education: N/A     Social History Main Topics    Smoking status: Current Every Day Smoker     Packs/day: 0 20     Types: Cigarettes    Smokeless tobacco: Never Used    Alcohol use No    Drug use: No    Sexual activity: Not Asked     Other Topics Concern    None     Social History Narrative    None       SURGICAL HISTORY  History reviewed   No pertinent surgical history  Meds/Allergies     CURRENT MEDICATIONS  No current facility-administered medications for this encounter  Current Outpatient Prescriptions:     cephalexin (KEFLEX) 250 mg capsule, One cap 4 times a day with food for 10 days, Disp: 40 capsule, Rfl: 0    cloNIDine (CATAPRES) 0 1 mg tablet, Take 0 1 mg by mouth , Disp: , Rfl:     divalproex sodium (DEPAKOTE ER) 250 mg 24 hr tablet, Take 250 mg by mouth , Disp: , Rfl:     fluticasone (FLONASE) 50 mcg/act nasal spray, 1 spray into each nostril daily, Disp: 16 g, Rfl: 12    pantoprazole (PROTONIX) 40 mg tablet, Take 1 tablet (40 mg total) by mouth daily  , Disp: 30 tablet, Rfl: 0    QUEtiapine (SEROquel) 200 mg tablet, Take 200 mg by mouth , Disp: , Rfl:     venlafaxine (EFFEXOR-XR) 150 mg 24 hr capsule, Take 150 mg by mouth , Disp: , Rfl:     (Not in a hospital admission)    ALLERGIES  Allergies   Allergen Reactions    Milk-Related Compounds      Objective     PHYSICAL EXAM    VITAL SIGNS: Blood pressure 137/93, pulse 94, temperature 97 8 °F (36 6 °C), temperature source Oral, resp  rate 18, weight 77 1 kg (170 lb), SpO2 96 %      Constitutional:  Appears well developed and well nourished, no acute distress, non-toxic appearance   Eyes:  PERRL, EOMI, conjunctivae pink, sclerae non-icteric, no nystagmus, optic discs sharp/ no papilledema    HENT:  Normocephalic/Atraumatic, no rhinorrhea, mucous membranes moist  Neck: normal range of motion, no tenderness, supple   Respiratory:  No respiratory distress, normal breath sounds,  Cardiovascular:  Normal rate, normal rhythm   GI:  Soft, non-tender, non-distended  :  No CVAT, no flank ecchymosis  Musculoskeletal:  No swelling or edema, no tenderness, no deformities  Integument:  Pink, warm, dry, Well hydrated, no rash, no erythema, no bullae   Lymphatic:  No cervical/ tonsillar/ submandibular lymphadenopathy noted   Neurologic:  Awake, Alert & oriented x 3, CN 2-12 intact, no focal neurological deficits  Psychiatric:  Speech and behavior appropriate       ED COURSE and MDM:    Assessment/Plan   Assessment:  Monae Carbajal is a 33 y o  male presents for evaluation of malaise and headache  No SI or HI  Plan:  Symptom management  Disposition as appropriate  Portions of the record may have been created with voice recognition software  Occasional wrong word or "sound a like" substitutions may have occurred due to the inherent limitations of voice recognition software       ED Provider  Electronically Signed by

## 2018-07-05 ENCOUNTER — HOSPITAL ENCOUNTER (EMERGENCY)
Facility: HOSPITAL | Age: 28
Discharge: HOME | End: 2018-07-05
Attending: EMERGENCY MEDICINE | Admitting: EMERGENCY MEDICINE
Payer: COMMERCIAL

## 2018-07-05 VITALS
DIASTOLIC BLOOD PRESSURE: 107 MMHG | SYSTOLIC BLOOD PRESSURE: 156 MMHG | TEMPERATURE: 97.5 F | OXYGEN SATURATION: 98 % | WEIGHT: 169.97 LBS | BODY MASS INDEX: 23.71 KG/M2 | HEART RATE: 100 BPM | RESPIRATION RATE: 18 BRPM

## 2018-07-05 DIAGNOSIS — J45.909 ASTHMA: Primary | ICD-10-CM

## 2018-07-05 PROCEDURE — 99283 EMERGENCY DEPT VISIT LOW MDM: CPT

## 2018-07-05 RX ORDER — ALBUTEROL SULFATE 90 UG/1
2 AEROSOL, METERED RESPIRATORY (INHALATION) ONCE
Status: COMPLETED | OUTPATIENT
Start: 2018-07-05 | End: 2018-07-05

## 2018-07-05 RX ADMIN — ALBUTEROL SULFATE 2 PUFF: 90 AEROSOL, METERED RESPIRATORY (INHALATION) at 02:37

## 2018-07-05 NOTE — DISCHARGE INSTRUCTIONS
Asthma   WHAT YOU NEED TO KNOW:   Asthma is a lung disease that makes breathing difficult  Chronic inflammation and reactions to triggers narrow the airways in the lungs  Asthma can become life-threatening if it is not managed  DISCHARGE INSTRUCTIONS:   Return to the emergency department if:   · You have severe shortness of breath  · Your lips or nails turn blue or gray  · The skin around your neck and ribs pulls in with each breath  · You have shortness of breath, even after you take your short-term medicine as directed  · Your peak flow numbers are in the red zone of your AAP  Contact your healthcare provider if:   · You run out of medicine before your next refill is due  · Your symptoms get worse  · You need to take more medicine than usual to control your symptoms  · You have questions or concerns about your condition or care  Medicines:   · Medicines  decrease inflammation, open airways, and make it easier to breathe  Medicines may be inhaled, taken as a pill, or injected  Short-term medicines relieve your symptoms quickly  Long-term medicines are used to prevent future attacks  You may also need medicine to help control your allergies  Ask your healthcare provider for more information about the medicine you are given and how to take it safely  · Take your medicine as directed  Contact your healthcare provider if you think your medicine is not helping or if you have side effects  Tell him of her if you are allergic to any medicine  Keep a list of the medicines, vitamins, and herbs you take  Include the amounts, and when and why you take them  Bring the list or the pill bottles to follow-up visits  Carry your medicine list with you in case of an emergency  Follow up with your healthcare provider as directed: You will need to return to make sure your medicine is working and your symptoms are controlled   You may be referred to an asthma specialist  [place] may be asked to keep a record of your peak flow values and bring it with you to your appointments  Write down your questions so you remember to ask them during your visits  Manage your symptoms and prevent future attacks:   · Follow your Asthma Action Plan (AAP)  This is a written plan that you and your healthcare provider create  It explains which medicine you need and when to change doses if necessary  It also explains how you can monitor symptoms and use a peak flow meter  The meter measures how well your lungs are working  · Manage other health conditions , such as allergies, acid reflux, and sleep apnea  · Identify and avoid triggers  These may include pets, dust mites, mold, and cockroaches  · Do not smoke or be around others who smoke  Nicotine and other chemicals in cigarettes and cigars can cause lung damage  Ask your healthcare provider for information if you currently smoke and need help to quit  E-cigarettes or smokeless tobacco still contain nicotine  Talk to your healthcare provider before you use these products  · Ask about the flu vaccine  The flu can make your asthma worse  You may need a yearly flu shot  © 2017 [address] [facility] Information is for End User's use only and may not be sold, redistributed or otherwise used for commercial purposes  All illustrations and images included in CareNotes® are the copyrighted property of [employer] M [employer]  or [employer]  The above information is an  only  It is not intended as medical advice for individual conditions or treatments  Talk to your doctor, nurse or pharmacist before following any medical regimen to see if it is safe and effective for you

## 2018-07-05 NOTE — ED ATTENDING ATTESTATION
I, [address], DO, saw and evaluated the patient  I have discussed the patient with the resident/non-physician practitioner and agree with the resident's/non-physician practitioner's findings, Plan of Care, and MDM as documented in the resident's/non-physician practitioner's note, except where noted  All available labs and Radiology studies were reviewed  At this point I agree with the current assessment done in the Emergency Department  I have conducted an independent evaluation of this patient a history and physical is as follows:    65-year-old male presents with shortness of breath  Patient states he ran out of his asthma medication and the weather made it worse  On exam-no acute distress, heart regular, lungs clear, no increased work of breathing    Plan-albuterol inhaler    Critical Care Time  CritCare Time    Procedures

## 2018-07-05 NOTE — ED PROVIDER NOTES
History  Chief Complaint   Patient presents with    Asthma     Pt complains of asthma starting to worsen over the last few hours  Patient is a 74-year-old male with a past medical history significant for asthma who presents with wheezing and shortness of breath  Reports that today, with the worsening heated humidity he started to feel like his asthma was worsening and he describes a sensation of wheezing and shortness of breath  Typically, he would use his albuterol inhaler, but he ran out  Denies chest pain, palpitations, cough, rhinorrhea, congestion, lightheadedness, fevers, chills  Assessment and Plan: Asthma  Patient is breathing comfortably, lungs are CTABL  Will give albuterolinhaler to administer 2 puffs in the ED and dischrge with PCP followup  Prior to Admission Medications   Prescriptions Last Dose Informant Patient Reported? Taking? QUEtiapine (SEROquel) 200 mg tablet   Yes Yes   Sig: Take 200 mg by mouth    cloNIDine (CATAPRES) 0 1 mg tablet   Yes Yes   Sig: Take 0 1 mg by mouth  divalproex sodium (DEPAKOTE ER) 250 mg 24 hr tablet   Yes Yes   Sig: Take 250 mg by mouth  fluticasone (FLONASE) 50 mcg/act nasal spray   No Yes   Si spray into each nostril daily   pantoprazole (PROTONIX) 40 mg tablet   No Yes   Sig: Take 1 tablet (40 mg total) by mouth daily  venlafaxine (EFFEXOR-XR) 150 mg 24 hr capsule   Yes Yes   Sig: Take 150 mg by mouth  Facility-Administered Medications: None       Past Medical History:   Diagnosis Date    Asthma     Depression     Mood swings ([facility] [address] )        History reviewed  No pertinent surgical history  History reviewed  No pertinent family history  I have reviewed and agree with the history as documented      Social History   Substance Use Topics    Smoking status: Current Every Day Smoker     Packs/day: 0 20     Types: Cigarettes    Smokeless tobacco: Never Used    Alcohol use No        Review of Systems   Constitutional: Negative for chills and fever  HENT: Negative for congestion and rhinorrhea  Respiratory: Positive for shortness of breath and wheezing  Negative for cough and chest tightness  Cardiovascular: Negative for chest pain and palpitations  Gastrointestinal: Negative for abdominal pain, diarrhea, nausea and vomiting  Skin: Negative for pallor and rash  Physical Exam  ED Triage Vitals [07/05/18 0219]   Temperature Pulse Respirations Blood Pressure SpO2   97 5 °F (36 4 °C) 100 18 (!) 156/107 98 %      Temp Source Heart Rate Source Patient Position - Orthostatic VS BP Location FiO2 (%)   Tympanic -- -- -- --      Pain Score       Worst Possible Pain           Orthostatic Vital Signs  Vitals:    07/05/18 0219   BP: (!) 156/107   Pulse: 100       Physical Exam   Constitutional: He is oriented to person, place, and time  He appears well-developed and well-nourished  No distress  HENT:   Head: Normocephalic and atraumatic  Right Ear: External ear normal    Left Ear: External ear normal    Nose: Nose normal    Mouth/Throat: Oropharynx is clear and moist  No oropharyngeal exudate  Eyes: Conjunctivae and EOM are normal  Pupils are equal, round, and reactive to light  Neck: Normal range of motion  Neck supple  Cardiovascular: Normal rate, regular rhythm, normal heart sounds and intact distal pulses  Exam reveals no gallop and no friction rub  No murmur heard  Pulmonary/Chest: Effort normal and breath sounds normal  No respiratory distress  He has no wheezes  He has no rales  He exhibits no tenderness  Abdominal: Soft  Bowel sounds are normal  He exhibits no distension  There is no tenderness  Musculoskeletal: Normal range of motion  He exhibits no edema or tenderness  Neurological: He is alert and oriented to person, place, and time  Skin: Skin is warm and dry  He is not diaphoretic  No erythema  Nursing note and vitals reviewed        ED Medications  Medications   albuterol (PROVENTIL HFA,VENTOLIN HFA) inhaler 2 puff (2 puffs Inhalation Given 7/5/18 9587)       Diagnostic Studies  Results Reviewed     None                 No orders to display         Procedures  Procedures      Phone Consults  ED Phone Contact    ED Course                               MDM  CritCare Time    Disposition  Final diagnoses:   Asthma     Time reflects when diagnosis was documented in both MDM as applicable and the Disposition within this note     Time User Action Codes Description Comment    7/5/2018  2:33 AM [address] [C65 598] Asthma       ED Disposition     ED Disposition Condition Comment    Discharge  Olya Vicente discharge to home/self care  Condition at discharge: Good        Follow-up Information     Follow up With Specialties Details Why Contact Info Additional [address], [address] Nurse Practitioner Schedule an appointment as soon as possible for a visit in 3 days for re-evaluation [address] [facility] [phone] [id number] [place] [address] Emergency Department Emergency Medicine Go to for re-evaluation, As needed, If symptoms worsen [address] [place] [address], [address] [place] [address], [facility]          Discharge Medication List as of 7/5/2018  2:34 AM      CONTINUE these medications which have NOT CHANGED    Details   cloNIDine (CATAPRES) 0 1 mg tablet Take 0 1 mg by mouth , Until Discontinued, Historical Med      divalproex sodium (DEPAKOTE ER) 250 mg 24 hr tablet Take 250 mg by mouth , Starting 7/25/2016, Until Discontinued, Historical Med      fluticasone (FLONASE) 50 mcg/act nasal spray 1 spray into each nostril daily, Starting 10/31/2016, Until Discontinued, Print      pantoprazole (PROTONIX) 40 mg tablet Take 1 tablet (40 mg total) by mouth daily  , Starting 9/4/2016, Until Discontinued, Print      QUEtiapine (SEROquel) 200 mg tablet Take 200 mg by mouth , Starting 2/23/2016, Until Discontinued, Historical Med venlafaxine (EFFEXOR-XR) 150 mg 24 hr capsule Take 150 mg by mouth , Starting 7/25/2016, Until Discontinued, Historical Med           No discharge procedures on file  ED Provider  Attending physically available and evaluated Aishwarya Connell I managed the patient along with the ED Attending      Electronically Signed by         Jojo Gross DO  07/05/18 7923

## 2018-11-30 ENCOUNTER — HOSPITAL ENCOUNTER (EMERGENCY)
Facility: HOSPITAL | Age: 28
Discharge: HOME | End: 2018-12-01
Attending: EMERGENCY MEDICINE
Payer: COMMERCIAL

## 2018-11-30 ENCOUNTER — APPOINTMENT (EMERGENCY)
Dept: RADIOLOGY | Facility: HOSPITAL | Age: 28
End: 2018-11-30
Payer: COMMERCIAL

## 2018-11-30 VITALS
DIASTOLIC BLOOD PRESSURE: 108 MMHG | SYSTOLIC BLOOD PRESSURE: 158 MMHG | RESPIRATION RATE: 16 BRPM | TEMPERATURE: 97.4 F | HEIGHT: 66 IN | HEART RATE: 111 BPM | BODY MASS INDEX: 28.93 KG/M2 | WEIGHT: 180 LBS | OXYGEN SATURATION: 98 %

## 2018-11-30 DIAGNOSIS — M79.643 HAND PAIN: ICD-10-CM

## 2018-11-30 DIAGNOSIS — T14.8XXA SKIN AVULSION: Primary | ICD-10-CM

## 2018-11-30 PROCEDURE — 90715 TDAP VACCINE 7 YRS/> IM: CPT | Performed by: EMERGENCY MEDICINE

## 2018-11-30 PROCEDURE — 73130 X-RAY EXAM OF HAND: CPT

## 2018-11-30 PROCEDURE — 90471 IMMUNIZATION ADMIN: CPT

## 2018-11-30 PROCEDURE — 99285 EMERGENCY DEPT VISIT HI MDM: CPT

## 2018-11-30 RX ORDER — IBUPROFEN 600 MG/1
600 TABLET ORAL ONCE
Status: COMPLETED | OUTPATIENT
Start: 2018-11-30 | End: 2018-11-30

## 2018-11-30 RX ADMIN — TETANUS TOXOID, REDUCED DIPHTHERIA TOXOID AND ACELLULAR PERTUSSIS VACCINE, ADSORBED 0.5 ML: 5; 2.5; 8; 8; 2.5 SUSPENSION INTRAMUSCULAR at 22:10

## 2018-11-30 RX ADMIN — IBUPROFEN 600 MG: 600 TABLET ORAL at 22:10
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
## 2018-12-01 NOTE — ED NOTES
Patient calm and cooperative  Patient also seen pacing around room, asking why he is still here   When re-directed to return to his bed, patient will do so for a period of time     Jose Antonio Crowley RN  12/01/18 4464
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
## 2018-12-01 NOTE — DISCHARGE INSTRUCTIONS
Skin Avulsion   WHAT YOU NEED TO KNOW:   Skin avulsion is a wound that happens when skin is torn from your body during an accident or other injury  The torn skin may be lost or too damaged to be repaired, and it must be removed  A wound of this type cannot be stitched closed because there is tissue missing  Avulsion wounds are usually bigger and have more scars because of the missing tissue  DISCHARGE INSTRUCTIONS:   Medicines:   · Antibiotic ointment:  Your healthcare provider may tell you to gently rub a topical antibiotic ointment on your wound  This will help prevent an infection and help your wound heal faster  · Pain medicine: You may be given medicine to take away or decrease pain  Do not wait until the pain is severe before you take your medicine  · NSAIDs , such as ibuprofen, help decrease swelling, pain, and fever  This medicine is available with or without a doctor's order  NSAIDs can cause stomach bleeding or kidney problems in certain people  If you take blood thinner medicine, always ask if NSAIDs are safe for you  Always read the medicine label and follow directions  Do not give these medicines to children under 10months of age without direction from your child's healthcare provider  · Take your medicine as directed  Contact your healthcare provider if you think your medicine is not helping or if you have side effects  Tell him of her if you are allergic to any medicine  Keep a list of the medicines, vitamins, and herbs you take  Include the amounts, and when and why you take them  Bring the list or the pill bottles to follow-up visits  Carry your medicine list with you in case of an emergency  Care for your wound:  Avulsion wounds may take longer to heal because they cannot be closed with tape or stitches  Keep your wound clean and protected to prevent infection and speed healing  · Clean your wound:  Wash your hands with soap and water before and after you care for your wound  You may be able to use a soft cloth to gently clean the wound after the first 24 to 48 hours  After that, gently clean the wound once or twice a day with cool water  Do not soak your wound  Use soap to clean around the wound, but try not to get any on the wound itself  Do not use alcohol or hydrogen peroxide to clean your wound unless you are directed to  Gently pat the area dry and reapply the bandage as directed  · Elevate your wound:  Prop your injured area on pillows to raise it above the level of your heart  This will help reduce pain and swelling  Do this for 30 minutes at a time, as often as you can  · Bandage your wound:  Bandages keep your wound clean, dry, and protected from infection  They may also prevent swelling  Use a bandage that does not stick to your wound, and has a spongy layer to absorb fluids  Leave your bandage on as long as directed  Ask your healthcare provider when and how to change your bandage  Do not wrap the bandage too tightly  This could cut off blood flow and cause more injury  · Use cool compresses:  Wet a washcloth or towel with cool water and hold it on your wound as directed  Ask how often to apply the compress and for how long each time  · Reduce scarring:  Avoid direct sunlight on your wound  Sunlight may burn or change the color of the new skin over your wound  Use sunscreen (SPF 30 or higher) on the new skin for at least 1 year after it heals  Support for leg and arm wounds: You may need to use crutches if the wound is on your leg  You may need to use a sling if the wound is on your arm  Crutches and slings help protect the injured area, prevent further injury, and heal the area in the right position  Follow up with your healthcare provider within 2 days or as directed: If you have stitches, ask when to return to have them removed  Write down your questions so you remember to ask them during your visits     Contact your healthcare provider if:   · You have new pain, or it gets worse  · You have trouble moving the injured body area  · Your wound splits open or does not seem to be healing  Return to the emergency department if:   · You have a fever  · You have painful swelling, redness, or warmth around your wound  · Your wound is red and there are red streaks on your skin starting at your wound and moving upward  · Your wound is draining pus  · You have heavy bleeding or bleeding that does not stop after 10 minutes of holding firm, direct pressure over the wound  · You feel like there is an object stuck in your wound  © 2017 [address] [employer] Information is for End User's use only and may not be sold, redistributed or otherwise used for commercial purposes  All illustrations and images included in CareNotes® are the copyrighted property of A [employer] M , Inc  or [employer]  The above information is an  only  It is not intended as medical advice for individual conditions or treatments  Talk to your doctor, nurse or pharmacist before following any medical regimen to see if it is safe and effective for you

## 2018-12-21 ENCOUNTER — APPOINTMENT (EMERGENCY)
Dept: RADIOLOGY | Facility: HOSPITAL | Age: 28
End: 2018-12-21
Payer: COMMERCIAL

## 2018-12-21 ENCOUNTER — HOSPITAL ENCOUNTER (EMERGENCY)
Facility: HOSPITAL | Age: 28
Discharge: HOME | End: 2018-12-21
Attending: EMERGENCY MEDICINE
Payer: COMMERCIAL

## 2018-12-21 VITALS
HEART RATE: 101 BPM | DIASTOLIC BLOOD PRESSURE: 83 MMHG | TEMPERATURE: 96 F | SYSTOLIC BLOOD PRESSURE: 128 MMHG | RESPIRATION RATE: 18 BRPM | OXYGEN SATURATION: 98 % | HEIGHT: 66 IN | WEIGHT: 174.1 LBS | BODY MASS INDEX: 27.98 KG/M2

## 2018-12-21 DIAGNOSIS — S63.91XS HAND SPRAIN, RIGHT, SEQUELA: Primary | ICD-10-CM

## 2018-12-21 PROCEDURE — 73130 X-RAY EXAM OF HAND: CPT

## 2018-12-21 PROCEDURE — 99283 EMERGENCY DEPT VISIT LOW MDM: CPT

## 2018-12-21 RX ORDER — DIPHENHYDRAMINE HCL 25 MG
25 TABLET ORAL 2 TIMES DAILY
COMMUNITY
End: 2019-02-04 | Stop reason: ALTCHOICE

## 2018-12-21 NOTE — DISCHARGE INSTRUCTIONS
Sprain   WHAT YOU NEED TO KNOW:   What is a sprain? A sprain happens when a ligament is stretched or torn  Ligaments are tough tissues that connect bones  Ligaments support your joints and keep your bones in place  They allow you to lift, lower, or rotate your arms and legs  A sprain may involve one or more ligaments  What causes a sprain? A sprain is usually caused by a direct injury or sudden twisting of the joint  This may happen while playing sports, or may be due to a fall or car accident  The following may increase your risk of a sprain:  · Overuse of your muscles or muscle fatigue     · A sudden increase in the amount and intensity of sports training    · Wearing shoes that do not fit or are not made for the activities you do    · Weighing at least 20 pounds more than your healthcare provider recommends    · Using sports equipment the wrong way  What are the signs and symptoms of a sprain? You may hear or feel a pop or snap at the time of the sprain  You may also have any of the following signs and symptoms:  · Pain, tenderness, or swelling in your joint    · Bruising    · Trouble putting weight on or moving your joint  How is a sprain diagnosed? Your healthcare provider will ask you about your injury and examine you  Tell him if you heard a snap or pop when you were injured  Your healthcare provider will check the movement and strength of your joint  You may be asked to move the joint yourself  You may also need any of the following:  · An x-ray, CT scan or MRI  may show the sprain or other damage  You may be given contrast liquid to help your injury show up better in the pictures  Tell the healthcare provider if you have ever had an allergic reaction to contrast liquid  Do not enter the MRI room with anything metal  Metal can cause serious injury  Tell the healthcare provider if you have any metal in or on your body  · Arthroscopy  is a procedure is a procedure to look inside your joint   Your healthcare provider makes a small incision in your joint and inserts a scope through it  The scope is a long tube with a magnifying glass, a camera, and a light on the end  )  How is a sprain treated? Treatment depends on which ligament was injured and if more than one is affected  Treatment may also depend on how severe your injury is and when the injury occurred  You may need any of the following:  · Support devices , such as an elastic bandage, splint, brace, or cast may be needed  These devices limit your movement and protect your joint  You may need to use crutches if the sprain is in your leg  This will help decrease your pain as you move around  · Prescription pain medicine  may be given  Ask how to take this medicine safely  · Acetaminophen  decreases pain and fever  It is available without a doctor's order  Ask how much to take and how often to take it  Follow directions  Acetaminophen can cause liver damage if not taken correctly  · NSAIDs , such as ibuprofen, help decrease swelling, pain, and fever  This medicine is available with or without a doctor's order  NSAIDs can cause stomach bleeding or kidney problems in certain people  If you take blood thinner medicine, always ask your healthcare provider if NSAIDs are safe for you  Always read the medicine label and follow directions  · Physical therapy  may be recommended by your healthcare provider  A physical therapist teaches you exercises to help improve movement and strength, and to decrease pain  · Surgery  may be needed to repair or replace a torn ligament if your sprain does not heal with other treatments  How can I manage my sprain? · Rest  your joint so that it can heal  Return to normal activities as directed  · Apply ice  on your injury for 15 to 20 minutes every hour or as directed  Use an ice pack, or put crushed ice in a plastic bag  Cover it with a towel   Ice helps prevent tissue damage and decreases swelling and pain     · Compress  the injured area as directed  Ask your healthcare provider if you should wrap an elastic bandage around your injured ligament  An elastic bandage provides support and helps decrease swelling and movement so your joint can heal      · Elevate  the injured area above the level of your heart as often as you can  This will help decrease swelling and pain  Prop the injured area on pillows or blankets to keep it elevated comfortably  How can another sprain be prevented? Regular exercise can strengthen your muscles and help prevent another injury  Do the following before you begin or return to regular exercise or sports training:  · Ask your healthcare provider about the activities you can do  Find out how long your ligament needs to heal  Do not do any physical activity until your healthcare provider says it is okay  If you start activity too soon, you may develop a more serious injury  · Always warm up and stretch  before your regular exercise, sport, or physical activity  · Take it slow  Slowly increase how often and how long you exercise or train  Sudden increases in how often you train may cause you to overstretch or tear your ligament  · Use the right equipment  Always wear shoes that fit well and are made for the activity that you are doing  You may also use ankle supports, elbow and knee pads, or braces  When should I seek immediate care? · You have numbness or tingling below the injury, such as in your fingers or toes  · The skin over your sprained area is blue or pale  · Your pain has increased or returned, even after you take pain medicine  When should I contact my healthcare provider? · Your symptoms do not better  · Your swelling has increased or returned  · Your joint becomes more weak or unstable  · You have questions or concerns about your condition or care  CARE AGREEMENT:   You have the right to help plan your care   Learn about your health condition and how it may be treated  Discuss treatment options with your caregivers to decide what care you want to receive  You always have the right to refuse treatment  The above information is an  only  It is not intended as medical advice for individual conditions or treatments  Talk to your doctor, nurse or pharmacist before following any medical regimen to see if it is safe and effective for you  © 2017 [address] [employer] Information is for End User's use only and may not be sold, redistributed or otherwise used for commercial purposes  All illustrations and images included in CareNotes® are the copyrighted property of [employer] M [employer]  or [employer]
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
## 2019-01-04 ENCOUNTER — HOSPITAL ENCOUNTER (EMERGENCY)
Facility: HOSPITAL | Age: 29
Discharge: HOME | End: 2019-01-04
Attending: EMERGENCY MEDICINE | Admitting: EMERGENCY MEDICINE
Payer: COMMERCIAL

## 2019-01-04 VITALS
OXYGEN SATURATION: 98 % | DIASTOLIC BLOOD PRESSURE: 100 MMHG | HEIGHT: 62 IN | TEMPERATURE: 99.4 F | SYSTOLIC BLOOD PRESSURE: 149 MMHG | HEART RATE: 99 BPM | BODY MASS INDEX: 30.91 KG/M2 | WEIGHT: 168 LBS | RESPIRATION RATE: 20 BRPM

## 2019-01-04 DIAGNOSIS — H66.90 OTITIS MEDIA: Primary | ICD-10-CM

## 2019-01-04 PROCEDURE — 99282 EMERGENCY DEPT VISIT SF MDM: CPT

## 2019-01-04 RX ORDER — AMOXICILLIN 500 MG/1
500 CAPSULE ORAL EVERY 12 HOURS SCHEDULED
Qty: 20 CAPSULE | Refills: 0 | Status: SHIPPED | OUTPATIENT
Start: 2019-01-04 | End: 2019-01-14

## 2019-01-04 RX ORDER — FLUTICASONE PROPIONATE 50 MCG
1 SPRAY, SUSPENSION (ML) NASAL DAILY
Qty: 16 G | Refills: 0 | Status: SHIPPED | OUTPATIENT
Start: 2019-01-04 | End: 2020-10-02

## 2019-01-04 NOTE — ED PROVIDER NOTES
History  Chief Complaint   Patient presents with    Earache     I have pain to my right ear  I have a cough, chest congestion also  Started couplel days ago  Patient is a 70-year-old male who comes from a independent living facility with a chief complaint today of right-sided ear pain  Patient reports he does have asthma however does not use his inhaler on a daily basis as he does not need to  Patient denies shortness of breath at this time and states the ear pain began last night  Patient reports the pain is worse in his right ear and his left ear does not have any pain  Patient reports he has had cold symptoms including nasal congestion, postnasal drip, sore throat, chest congestion, intermittent headaches for the past 5 days  Patient denies fevers, chills, abdominal pain, nausea, vomiting, diarrhea  Patient reports he is eating and drinking well  Patient denies taking any medications to try and relieve his symptoms  Patient reports he is up-to-date on all vaccines and has not had any sick contacts        Earache   Location:  Right  Behind ear:  No abnormality  Quality:  Aching  Severity:  Mild  Onset quality:  Gradual  Duration:  2 days  Timing:  Constant  Progression:  Unchanged  Chronicity:  New  Context: recent URI    Relieved by:  None tried  Worsened by:  Nothing  Ineffective treatments:  None tried  Associated symptoms: congestion, cough ( non productive), headaches, rhinorrhea and sore throat    Associated symptoms: no abdominal pain, no diarrhea, no ear discharge, no fever, no rash and no vomiting        Prior to Admission Medications   Prescriptions Last Dose Informant Patient Reported? Taking? QUEtiapine (SEROquel) 200 mg tablet   Yes No   Sig: Take 200 mg by mouth    cloNIDine (CATAPRES) 0 1 mg tablet   Yes No   Sig: Take 0 1 mg by mouth     diphenhydrAMINE (BENADRYL) 25 mg tablet   Yes No   Sig: Take 25 mg by mouth 2 (two) times a day   divalproex sodium (DEPAKOTE ER) 250 mg 24 hr tablet   Yes No   Sig: Take 250 mg by mouth  fluticasone (FLONASE) 50 mcg/act nasal spray   No No   Si spray into each nostril daily   pantoprazole (PROTONIX) 40 mg tablet   No No   Sig: Take 1 tablet (40 mg total) by mouth daily  venlafaxine (EFFEXOR-XR) 150 mg 24 hr capsule   Yes No   Sig: Take 150 mg by mouth  Facility-Administered Medications: None       Past Medical History:   Diagnosis Date    Asthma     Bipolar 1 disorder ([facility] [address]     Depression     Intellectual functioning disability     Intestinal disaccharidase deficiency     Mood swings        History reviewed  No pertinent surgical history  History reviewed  No pertinent family history  I have reviewed and agree with the history as documented  Social History   Substance Use Topics    Smoking status: Current Every Day Smoker     Packs/day: 0 20     Types: Cigarettes    Smokeless tobacco: Never Used    Alcohol use No        Review of Systems   Constitutional: Negative for chills, fatigue and fever  HENT: Positive for congestion, ear pain, postnasal drip, rhinorrhea and sore throat  Negative for ear discharge  Eyes: Negative for redness  Respiratory: Positive for cough ( non productive)  Negative for chest tightness, shortness of breath, wheezing and stridor  Cardiovascular: Negative for chest pain and palpitations  Gastrointestinal: Negative for abdominal pain, diarrhea, nausea and vomiting  Genitourinary: Negative for dysuria and hematuria  Musculoskeletal: Negative  Skin: Negative for rash  Neurological: Positive for headaches  Negative for dizziness, syncope, light-headedness and numbness  Physical Exam  Physical Exam   Constitutional: He is oriented to person, place, and time  He appears well-developed and well-nourished  HENT:   Head: Normocephalic and atraumatic  Right Ear: There is tenderness  Tympanic membrane is erythematous  A middle ear effusion is present     Left Ear: Tympanic membrane normal    Postnasal drip noted however no tonsillar exudates or hypertrophy      Patient has tenderness with external ear movement, no mastoid tenderness noted  Eyes: Pupils are equal, round, and reactive to light  Neck: Normal range of motion  Cardiovascular: Normal rate, regular rhythm and normal heart sounds  Pulmonary/Chest: Effort normal and breath sounds normal    Abdominal: Soft  There is no tenderness  There is no guarding  Lymphadenopathy:     He has no cervical adenopathy  Neurological: He is alert and oriented to person, place, and time  Skin: Skin is warm and dry  Capillary refill takes less than 2 seconds  Psychiatric: He has a normal mood and affect  Nursing note and vitals reviewed  Vital Signs  ED Triage Vitals [01/04/19 1515]   Temperature Pulse Respirations Blood Pressure SpO2   99 4 °F (37 4 °C) 99 20 149/100 98 %      Temp Source Heart Rate Source Patient Position - Orthostatic VS BP Location FiO2 (%)   Tympanic Monitor Sitting Left arm --      Pain Score       --           Vitals:    01/04/19 1515   BP: 149/100   Pulse: 99   Patient Position - Orthostatic VS: Sitting       Visual Acuity      ED Medications  Medications - No data to display    Diagnostic Studies  Results Reviewed     None                 No orders to display              Procedures  Procedures       Phone Contacts  ED Phone Contact    ED Course                               MDM  CritCare Time    Disposition  Final diagnoses:   Otitis media     Time reflects when diagnosis was documented in both MDM as applicable and the Disposition within this note     Time User Action Codes Description Comment    1/4/2019  3:28 PM Puma Penaloza Add [H66 [id number]] Otitis media       ED Disposition     ED Disposition Condition Comment    Discharge  Trisha Medico discharge to home/self care      Condition at discharge: Good        Follow-up Information     Follow up With Specialties Details Why Contact Kale Fernandez, BRANDEE Nurse Practitioner Schedule an appointment as soon as possible for a visit As needed [address] [facility] [address]  [phone]            Patient's Medications   Discharge Prescriptions    AMOXICILLIN (AMOXIL) 500 MG CAPSULE    Take 1 capsule (500 mg total) by mouth every 12 (twelve) hours for 10 days       Start Date: 1/4/2019  End Date: 1/14/2019       Order Dose: 500 mg       Quantity: 20 capsule    Refills: 0    FLUTICASONE (FLONASE) 50 MCG/ACT NASAL SPRAY    1 spray into each nostril daily       Start Date: 1/4/2019  End Date: --       Order Dose: 1 spray       Quantity: 16 g    Refills: 0     No discharge procedures on file      ED Provider  Electronically Signed by           Suellen Almendarez PA-C  01/04/19 6242

## 2019-01-04 NOTE — DISCHARGE INSTRUCTIONS
Otitis Media   WHAT YOU NEED TO KNOW:   Otitis media is an ear infection  DISCHARGE INSTRUCTIONS:   Medicines:  · Ibuprofen or acetaminophen  helps decrease your pain and fever  They are available without a doctor's order  Ask your healthcare provider which medicine is right for you  Ask how much to take and how often to take it  These medicines can cause stomach bleeding if not taken correctly  Ibuprofen can cause kidney damage  Do not take ibuprofen if you have kidney disease, an ulcer, or allergies to aspirin  Acetaminophen can cause liver damage  Do not drink alcohol if you take acetaminophen  · Ear drops  help treat your ear pain  · Antibiotics  help treat a bacterial infection that caused your ear infection  · Take your medicine as directed  Contact your healthcare provider if you think your medicine is not helping or if you have side effects  Tell him or her if you are allergic to any medicine  Keep a list of the medicines, vitamins, and herbs you take  Include the amounts, and when and why you take them  Bring the list or the pill bottles to follow-up visits  Carry your medicine list with you in case of an emergency  Heat or ice:   · Heat  may be used to decrease your pain  Place a warm, moist washcloth on your ear  Apply for 15 to 20 minutes, 3 to 4 times a day    · Ice  helps decrease swelling and pain  Use an ice pack or put crushed ice in a plastic bag  Cover the ice pack with a towel and place it on your ear for 15 to 20 minutes, 3 to 4 times a day for 2 days  Prevent otitis media:   · Wash your hands often  Use soap and water  Wash your hands after you use the bathroom, change a child's diapers, or sneeze  Wash your hands before you prepare or eat food  · Stay away from people who are ill  Some germs are easily and quickly spread through contact  Return to work or school: You may return to work or school when your fever is gone     Follow up with your healthcare provider as directed:  Write down your questions so you remember to ask them during your visits  Contact your healthcare provider if:   · Your ear pain gets worse or does not go away, even after treatment  · The outside of your ear is red or swollen  · You have vomiting or diarrhea  · You have fluid coming from your ear  · You have questions or concerns about your condition or care  Return to the emergency department if:   · You have a seizure  · You have a fever and a stiff neck  © 2017 [address] [facility] Information is for End User's use only and may not be sold, redistributed or otherwise used for commercial purposes  All illustrations and images included in CareNotes® are the copyrighted property of [employer]  or [employer]  The above information is an  only  It is not intended as medical advice for individual conditions or treatments  Talk to your doctor, nurse or pharmacist before following any medical regimen to see if it is safe and effective for you

## 2019-01-21 ENCOUNTER — HOSPITAL ENCOUNTER (EMERGENCY)
Facility: HOSPITAL | Age: 29
Discharge: HOME | End: 2019-01-21
Attending: EMERGENCY MEDICINE
Payer: COMMERCIAL

## 2019-01-21 VITALS
TEMPERATURE: 98.2 F | BODY MASS INDEX: 30.73 KG/M2 | WEIGHT: 167.99 LBS | SYSTOLIC BLOOD PRESSURE: 169 MMHG | HEART RATE: 105 BPM | DIASTOLIC BLOOD PRESSURE: 74 MMHG | OXYGEN SATURATION: 98 % | RESPIRATION RATE: 18 BRPM

## 2019-01-21 DIAGNOSIS — R10.9 ABDOMINAL PAIN: Primary | ICD-10-CM

## 2019-01-21 DIAGNOSIS — R11.2 NAUSEA & VOMITING: ICD-10-CM

## 2019-01-21 LAB
ALBUMIN SERPL BCP-MCNC: 3.7 G/DL (ref 3.5–5)
ALP SERPL-CCNC: 81 U/L (ref 46–116)
ALT SERPL W P-5'-P-CCNC: 32 U/L (ref 12–78)
ANION GAP SERPL CALCULATED.3IONS-SCNC: 4 MMOL/L (ref 4–13)
AST SERPL W P-5'-P-CCNC: 20 U/L (ref 5–45)
BASOPHILS # BLD AUTO: 0.02 THOUSANDS/ΜL (ref 0–0.1)
BASOPHILS NFR BLD AUTO: 0 % (ref 0–1)
BILIRUB SERPL-MCNC: 0.37 MG/DL (ref 0.2–1)
BUN SERPL-MCNC: 12 MG/DL (ref 5–25)
CALCIUM SERPL-MCNC: 8.8 MG/DL (ref 8.3–10.1)
CHLORIDE SERPL-SCNC: 105 MMOL/L (ref 100–108)
CO2 SERPL-SCNC: 30 MMOL/L (ref 21–32)
CREAT SERPL-MCNC: 0.96 MG/DL (ref 0.6–1.3)
EOSINOPHIL # BLD AUTO: 0.06 THOUSAND/ΜL (ref 0–0.61)
EOSINOPHIL NFR BLD AUTO: 1 % (ref 0–6)
ERYTHROCYTE [DISTWIDTH] IN BLOOD BY AUTOMATED COUNT: 12.1 % (ref 11.6–15.1)
GFR SERPL CREATININE-BSD FRML MDRD: 123 ML/MIN/1.73SQ M
GLUCOSE SERPL-MCNC: 80 MG/DL (ref 65–140)
HCT VFR BLD AUTO: 43.6 % (ref 36.5–49.3)
HGB BLD-MCNC: 15.3 G/DL (ref 12–17)
IMM GRANULOCYTES # BLD AUTO: 0.02 THOUSAND/UL (ref 0–0.2)
IMM GRANULOCYTES NFR BLD AUTO: 0 % (ref 0–2)
LIPASE SERPL-CCNC: 112 U/L (ref 73–393)
LYMPHOCYTES # BLD AUTO: 2.34 THOUSANDS/ΜL (ref 0.6–4.47)
LYMPHOCYTES NFR BLD AUTO: 48 % (ref 14–44)
MCH RBC QN AUTO: 31.9 PG (ref 26.8–34.3)
MCHC RBC AUTO-ENTMCNC: 35.1 G/DL (ref 31.4–37.4)
MCV RBC AUTO: 91 FL (ref 82–98)
MONOCYTES # BLD AUTO: 0.67 THOUSAND/ΜL (ref 0.17–1.22)
MONOCYTES NFR BLD AUTO: 13 % (ref 4–12)
NEUTROPHILS # BLD AUTO: 1.89 THOUSANDS/ΜL (ref 1.85–7.62)
NEUTS SEG NFR BLD AUTO: 38 % (ref 43–75)
NRBC BLD AUTO-RTO: 0 /100 WBCS
PLATELET # BLD AUTO: 232 THOUSANDS/UL (ref 149–390)
PMV BLD AUTO: 10.2 FL (ref 8.9–12.7)
POTASSIUM SERPL-SCNC: 3.9 MMOL/L (ref 3.5–5.3)
PROT SERPL-MCNC: 7.2 G/DL (ref 6.4–8.2)
RBC # BLD AUTO: 4.79 MILLION/UL (ref 3.88–5.62)
SODIUM SERPL-SCNC: 139 MMOL/L (ref 136–145)
WBC # BLD AUTO: 5 THOUSAND/UL (ref 4.31–10.16)

## 2019-01-21 PROCEDURE — 85025 COMPLETE CBC W/AUTO DIFF WBC: CPT | Performed by: EMERGENCY MEDICINE

## 2019-01-21 PROCEDURE — 99284 EMERGENCY DEPT VISIT MOD MDM: CPT

## 2019-01-21 PROCEDURE — 83690 ASSAY OF LIPASE: CPT | Performed by: EMERGENCY MEDICINE

## 2019-01-21 PROCEDURE — 36415 COLL VENOUS BLD VENIPUNCTURE: CPT

## 2019-01-21 PROCEDURE — 80053 COMPREHEN METABOLIC PANEL: CPT | Performed by: EMERGENCY MEDICINE

## 2019-01-21 RX ORDER — ONDANSETRON 4 MG/1
4 TABLET, ORALLY DISINTEGRATING ORAL EVERY 8 HOURS PRN
Qty: 10 TABLET | Refills: 0 | Status: SHIPPED | OUTPATIENT
Start: 2019-01-21 | End: 2020-10-02

## 2019-01-21 RX ORDER — MAGNESIUM HYDROXIDE/ALUMINUM HYDROXICE/SIMETHICONE 120; 1200; 1200 MG/30ML; MG/30ML; MG/30ML
15 SUSPENSION ORAL ONCE
Status: COMPLETED | OUTPATIENT
Start: 2019-01-21 | End: 2019-01-21

## 2019-01-21 RX ORDER — SUCRALFATE ORAL 1 G/10ML
1 SUSPENSION ORAL 2 TIMES DAILY PRN
Qty: 420 ML | Refills: 0 | Status: SHIPPED | OUTPATIENT
Start: 2019-01-21 | End: 2020-10-02

## 2019-01-21 RX ADMIN — ALUMINUM HYDROXIDE, MAGNESIUM HYDROXIDE, AND SIMETHICONE 15 ML: 200; 200; 20 SUSPENSION ORAL at 20:42

## 2019-01-21 RX ADMIN — LIDOCAINE HYDROCHLORIDE 15 ML: 20 SOLUTION ORAL; TOPICAL at 20:42

## 2019-01-22 NOTE — DISCHARGE INSTRUCTIONS
Abdominal Pain   WHAT YOU NEED TO KNOW:   Abdominal pain can be dull, achy, or sharp  You may have pain in one area of your abdomen, or in your entire abdomen  Your pain may be caused by a condition such as constipation, food sensitivity or poisoning, infection, or a blockage  Abdominal pain can also be from a hernia, appendicitis, or an ulcer  Liver, gallbladder, or kidney conditions can also cause abdominal pain  The cause of your abdominal pain may be unknown  DISCHARGE INSTRUCTIONS:   Return to the emergency department if:   · You have new chest pain or shortness of breath  · You have pulsing pain in your upper abdomen or lower back that suddenly becomes constant  · Your pain is in the right lower abdominal area and worsens with movement  · You have a fever over 100 4°F (38°C) or shaking chills  · You are vomiting and cannot keep food or liquids down  · Your pain does not improve or gets worse over the next 8 to 12 hours  · You see blood in your vomit or bowel movements, or they look black and tarry  · Your skin or the whites of your eyes turn yellow  · You are a woman and have a large amount of vaginal bleeding that is not your monthly period  Contact your healthcare provider if:   · You have pain in your lower back  · You are a man and have pain in your testicles  · You have pain when you urinate  · You have questions or concerns about your condition or care  Follow up with your healthcare provider within 24 hours or as directed:  Write down your questions so you remember to ask them during your visits  Medicines:   · Medicines  may be given to calm your stomach and prevent vomiting or to decrease pain  Ask how to take pain medicine safely  · Take your medicine as directed  Contact your healthcare provider if you think your medicine is not helping or if you have side effects  Tell him of her if you are allergic to any medicine   Keep a list of the medicines, vitamins, and herbs you take  Include the amounts, and when and why you take them  Bring the list or the pill bottles to follow-up visits  Carry your medicine list with you in case of an emergency  © 2017 [employer]  Information is for End User's use only and may not be sold, redistributed or otherwise used for commercial purposes  All illustrations and images included in CareNotes® are the copyrighted property of A D A M , Inc  or [employer]  The above information is an  only  It is not intended as medical advice for individual conditions or treatments  Talk to your doctor, nurse or pharmacist before following any medical regimen to see if it is safe and effective for you  Ondansetron (By mouth, Into the mouth)   Ondansetron (on-DAN-se-nancy)  Prevents nausea and vomiting  Brand Name(s): Zofran, Zofran ODT, Zuplenz   There may be other brand names for this medicine  When This Medicine Should Not Be Used: This medicine is not right for everyone  Do not use it if you had an allergic reaction to ondansetron  How to Use This Medicine: Thin Sheet, Liquid, Tablet, Dissolving Tablet  · Your doctor will tell you how much medicine to use  Do not use more than directed  · Measure the oral liquid medicine with a marked measuring spoon, oral syringe, or medicine cup  · To use the disintegrating tablet:   ¨ Do not open the blister pack that contains the tablet until you are ready to take it  ¨ Make sure your hands are dry  Peel back the foil, then remove the tablet from the blister pack  Do not push the tablet through the foil  ¨ Place the tablet on top of your tongue where it will dissolve in seconds  After the tablet has melted, swallow or take a sip of water  · To use the soluble film:   ¨ Make sure your hands are clean and dry  ¨ Fold the pouch along the dotted line  ¨ While still folded, tear the pouch carefully along the edge   Remove the film from the pouch   ¨ Place the film on top of your tongue  It will dissolve in 4 to 20 seconds  Do not chew or swallow the film whole  ¨ After the film has dissolved, you may swallow with or without water  · Read and follow the patient instructions that come with this medicine  Talk to your doctor or pharmacist if you have any questions  · Missed dose: Take a dose as soon as you remember  If it is almost time for your next dose, wait until then and take a regular dose  Do not take extra medicine to make up for a missed dose  · Store the medicine in a closed container at room temperature, away from heat, moisture, and direct light  Keep the soluble film in the foil pouch until you ready to use it  Drugs and Foods to Avoid:   Ask your doctor or pharmacist before using any other medicine, including over-the-counter medicines, vitamins, and herbal products  · Do not use this medicine together with apomorphine  · Some medicines may affect how ondansetron works  Tell your doctor if you are using tramadol, diuretics (water pills), or any other medicine for nausea and vomiting  Warnings While Using This Medicine:   · Tell your doctor if you are pregnant or breastfeeding, or if you have liver disease, congestive heart failure, heart rhythm problems (such as prolonged QT interval, slow heartbeat), low magnesium or potassium levels, stomach or bowel problems, or phenylketonuria (PKU)  · This medicine may cause heart rhythm problems (such as QT prolongation)  · This medicine may make you dizzy  Do not drive or do anything else that could be dangerous until you know how this medicine affects you  · Keep all medicine out of the reach of children  Never share your medicine with anyone    Possible Side Effects While Using This Medicine:   Call your doctor right away if you notice any of these side effects:  · Allergic reaction: Itching or hives, swelling in your face or hands, swelling or tingling in your mouth or throat, chest tightness, trouble breathing  · Fainting, dizziness, or lightheadedness  · Fast, pounding, or uneven heartbeat  · Trouble breathing  If you notice these less serious side effects, talk with your doctor:   · Constipation or diarrhea  · Headache  · Tiredness or weakness  If you notice other side effects that you think are caused by this medicine, tell your doctor  Call your doctor for medical advice about side effects  You may report side effects to FDA at 8-366-LTX-0637  © 2017 [employer]  Information is for End User's use only and may not be sold, redistributed or otherwise used for commercial purposes  The above information is an  only  It is not intended as medical advice for individual conditions or treatments  Talk to your doctor, nurse or pharmacist before following any medical regimen to see if it is safe and effective for you  Sucralfate (By mouth)   Sucralfate ([id number]-[id number])  Treats ulcers  Brand Name(s): Carafate   There may be other brand names for this medicine  When This Medicine Should Not Be Used: You should not use this medicine if you have had an allergic reaction to it  How to Use This Medicine:   Tablet, Liquid  · Your doctor will tell you how much to take and how often  · Do not stop taking the medicine unless your doctor tells you to, even if you feel better  · Take your medicine on an empty stomach  Swallow the tablet with a glass of water  · Unless your doctor tells you otherwise, take the medicine 1 hour before meals and at bedtime  · Measure the oral liquid medicine using a measuring spoon or medicine cup  · Shake the oral liquid medicine well before using  If a dose is missed:   · Take your medicine as soon as you remember that you missed your dose  · If it is nearly time for your next dose, wait until then to take your medicine and skip the missed dose  · You should not use two doses at one time    How to Store and Dispose of This Medicine: · Keep the medicine at room temperature, away from heat, light, and moisture  Do not freeze the oral liquid  · Keep all medicine out of the reach of children  Drugs and Foods to Avoid:   Ask your doctor or pharmacist before using any other medicine, including over-the-counter medicines, vitamins, and herbal products  · Antacids may be taken one-half hour before or after taking sucralfate  · You should not take other medicines within 2 hours before or after taking sucralfate  If you need help deciding the best times to take your other medicines, ask your doctor or pharmacist   Warnings While Using This Medicine:   · If you are pregnant or breastfeeding, talk to your doctor before taking this medicine  · Check with your doctor before taking if you have kidney problems or are on dialysis  Possible Side Effects While Using This Medicine:   Call your doctor right away if you notice any of these side effects:  · Rash, hives, or itching  · Swelling of the face or mouth  If you notice these less serious side effects, talk with your doctor:   · Constipation  · Dry mouth  · Mild stomach cramps, diarrhea  · Upset stomach, gas  · Dizziness  If you notice other side effects that you think are caused by this medicine, tell your doctor  Call your doctor for medical advice about side effects  You may report side effects to FDA at 8-844-FDA-6406  © 2017 [address] [employer] Information is for End User's use only and may not be sold, redistributed or otherwise used for commercial purposes  The above information is an  only  It is not intended as medical advice for individual conditions or treatments  Talk to your doctor, nurse or pharmacist before following any medical regimen to see if it is safe and effective for you

## 2019-01-22 NOTE — ED ATTENDING ATTESTATION
Remi Smalls DO, saw and evaluated the patient  I have discussed the patient with the resident/non-physician practitioner and agree with the resident's/non-physician practitioner's findings, Plan of Care, and MDM as documented in the resident's/non-physician practitioner's note, except where noted  All available labs and Radiology studies were reviewed  At this point I agree with the current assessment done in the Emergency Department  I have conducted an independent evaluation of this patient a history and physical is as follows:      3d n/v, abd pain mostly localized over epigastric region and umbilicus  No a/e factors  Constant but waxes and wanes  4/10  Denies fever, urinary sxs, rash  Abd snd mild TTP over umbilicus and epigastric region  Bs+ no r/g    Imp: abd pain NOS  Plan: abd labs, tx sx, reassess      Critical Care Time  CritCare Time    Procedures

## 2019-01-22 NOTE — ED PROVIDER NOTES
History  Chief Complaint   Patient presents with    Abdominal Pain     N/V abd pain x a few weeks  Denies diarrhea  33year-old man presents for evaluation of abdominal pain  He notes a 3-day history of an intermittent sharp epigastric abdominal pain without radiation  He notes associated nausea and vomiting without fevers, URI symptoms, diarrhea, or urinary symptoms  Pain is worse with eating  He has a history of GERD and says it feels like heart burn  No previous abdominal surgeries  On arrival, patient is afebrile and mildly tachycardic with otherwise normal vital signs  Physical exam shows mild tenderness in the epigastric region without peritonitis  Will perform abdominal labs and urine dip  Will treat symptomatically with Maalox/viscous lidocaine and reassess  Prior to Admission Medications   Prescriptions Last Dose Informant Patient Reported? Taking? QUEtiapine (SEROquel) 200 mg tablet   Yes No   Sig: Take 200 mg by mouth    cloNIDine (CATAPRES) 0 1 mg tablet   Yes No   Sig: Take 0 1 mg by mouth  diphenhydrAMINE (BENADRYL) 25 mg tablet   Yes No   Sig: Take 25 mg by mouth 2 (two) times a day   divalproex sodium (DEPAKOTE ER) 250 mg 24 hr tablet   Yes No   Sig: Take 250 mg by mouth  fluticasone (FLONASE) 50 mcg/act nasal spray   No No   Si spray into each nostril daily   fluticasone (FLONASE) 50 mcg/act nasal spray   No No   Si spray into each nostril daily   pantoprazole (PROTONIX) 40 mg tablet   No No   Sig: Take 1 tablet (40 mg total) by mouth daily  venlafaxine (EFFEXOR-XR) 150 mg 24 hr capsule   Yes No   Sig: Take 150 mg by mouth  Facility-Administered Medications: None       Past Medical History:   Diagnosis Date    Asthma     Bipolar 1 disorder ([facility] [address] )     Depression     Intellectual functioning disability     Intestinal disaccharidase deficiency     Mood swings        History reviewed  No pertinent surgical history  History reviewed   No pertinent family history  I have reviewed and agree with the history as documented  Social History   Substance Use Topics    Smoking status: Current Every Day Smoker     Packs/day: 0 20     Types: Cigarettes    Smokeless tobacco: Never Used    Alcohol use No        Review of Systems   Constitutional: Negative for chills and fever  HENT: Negative for rhinorrhea and sore throat  Eyes: Negative for photophobia and visual disturbance  Respiratory: Negative for cough and shortness of breath  Cardiovascular: Negative for chest pain and leg swelling  Gastrointestinal: Positive for abdominal pain, nausea and vomiting  Negative for blood in stool, constipation and diarrhea  Genitourinary: Negative for dysuria, frequency, hematuria and testicular pain  Musculoskeletal: Negative for back pain and neck pain  Skin: Negative for rash and wound  Neurological: Negative for light-headedness and headaches  Physical Exam  ED Triage Vitals [01/21/19 1953]   Temperature Pulse Respirations Blood Pressure SpO2   98 2 °F (36 8 °C) (!) 110 18 139/67 100 %      Temp Source Heart Rate Source Patient Position - Orthostatic VS BP Location FiO2 (%)   Oral Monitor Sitting Right arm --      Pain Score       No Pain           Orthostatic Vital Signs  Vitals:    01/21/19 1953 01/21/19 2110   BP: 139/67 169/74   Pulse: (!) 110 105   Patient Position - Orthostatic VS: Sitting Sitting       Physical Exam   Constitutional: He is oriented to person, place, and time  He appears well-developed and well-nourished  No distress  HENT:   Head: Normocephalic and atraumatic  Eyes: Pupils are equal, round, and reactive to light  Conjunctivae are normal  No scleral icterus  Neck: Neck supple  No JVD present  Cardiovascular: Normal rate, regular rhythm and normal heart sounds  Exam reveals no gallop and no friction rub  No murmur heard  Pulmonary/Chest: Effort normal and breath sounds normal  No respiratory distress  He has no wheezes  He has no rales  Abdominal: Soft  He exhibits no distension  There is tenderness (Mild epigastric without peritonitis)  There is no rebound and no guarding  Musculoskeletal: He exhibits no edema or tenderness  Neurological: He is alert and oriented to person, place, and time  Skin: Skin is warm and dry  He is not diaphoretic  Psychiatric: He has a normal mood and affect  His behavior is normal  Thought content normal    Vitals reviewed  ED Medications  Medications   aluminum-magnesium hydroxide-simethicone (MYLANTA) 200-200-20 mg/5 mL oral suspension 15 mL (15 mL Oral Given 1/21/19 2042)   lidocaine viscous (XYLOCAINE) 2 % mucosal solution 15 mL (15 mL Swish & Swallow Given 1/21/19 2042)       Diagnostic Studies  Results Reviewed     Procedure Component Value Units Date/Time    Comprehensive metabolic panel [[id number]] Collected:  01/21/19 2008    Lab Status:  Final result Specimen:  Blood from Arm, Left Updated:  01/21/19 2049     Sodium 139 mmol/L      Potassium 3 9 mmol/L      Chloride 105 mmol/L      CO2 30 mmol/L      ANION GAP 4 mmol/L      BUN 12 mg/dL      Creatinine 0 96 mg/dL      Glucose 80 mg/dL      Calcium 8 8 mg/dL      AST 20 U/L      ALT 32 U/L      Alkaline Phosphatase 81 U/L      Total Protein 7 2 g/dL      Albumin 3 7 g/dL      Total Bilirubin 0 37 mg/dL      eGFR 123 ml/min/1 73sq m     Narrative:         National Kidney Disease Education Program recommendations are as follows:  GFR calculation is accurate only with a steady state creatinine  Chronic Kidney disease less than 60 ml/min/1 73 sq  meters  Kidney failure less than 15 ml/min/1 73 sq  meters      Lipase [[id number]]  (Normal) Collected:  01/21/19 2008    Lab Status:  Final result Specimen:  Blood from Arm, Left Updated:  01/21/19 2043     Lipase 112 u/L     CBC and differential [[id number]]  (Abnormal) Collected:  01/21/19 2008    Lab Status:  Final result Specimen:  Blood from Arm, Left Updated:  01/21/19 2025     WBC 5 00 Thousand/uL      RBC 4 79 Million/uL      Hemoglobin 15 3 g/dL      Hematocrit 43 6 %      MCV 91 fL      MCH 31 9 pg      MCHC 35 1 g/dL      RDW 12 1 %      MPV 10 2 fL      Platelets 042 Thousands/uL      nRBC 0 /100 WBCs      Neutrophils Relative 38 (L) %      Immat GRANS % 0 %      Lymphocytes Relative 48 (H) %      Monocytes Relative 13 (H) %      Eosinophils Relative 1 %      Basophils Relative 0 %      Neutrophils Absolute 1 89 Thousands/µL      Immature Grans Absolute 0 02 Thousand/uL      Lymphocytes Absolute 2 34 Thousands/µL      Monocytes Absolute 0 67 Thousand/µL      Eosinophils Absolute 0 06 Thousand/µL      Basophils Absolute 0 02 Thousands/µL                  No orders to display         Procedures  Procedures      Phone Consults  ED Phone Contact    ED Course                               MDM  Number of Diagnoses or Management Options  Abdominal pain:   Nausea & vomiting:   Diagnosis management comments: Abdominal labs unremarkable  Patient with complete symptomatic relief after GI cocktail  Likely gastritis in etiology with history of GERD  Patient was discharged with carafate, GI cocktail, and return precautions for worsening symptoms  CritCare Time    Disposition  Final diagnoses:   Abdominal pain   Nausea & vomiting     Time reflects when diagnosis was documented in both MDM as applicable and the Disposition within this note     Time User Action Codes Description Comment    1/21/2019 10:09 PM Fletcher Latham Duty [R10 9] Abdominal pain     1/21/2019 10:09 PM Mary Latham Add [R11 2] Nausea & vomiting       ED Disposition     ED Disposition Condition Comment    Discharge  Arnette Wabash discharge to home/self care      Condition at discharge: Good        Follow-up Information     Follow up With Specialties Details Why Contact Info Additional [facility] Schedule an appointment as soon as possible for a visit For further management of abdominal pain [facility] [place], [address], [place], [place], [place]/ [address] Radha Decker Nurse Practitioner  As needed [address] [facility] [address]        [address] [place] [address] Emergency Department Emergency Medicine  As needed [address]  [facility] ED, [address] [facility] [address], [place], [place], [address]          Discharge Medication List as of 1/21/2019 10:14 PM      START taking these medications    Details   ondansetron (ZOFRAN-ODT) 4 mg disintegrating tablet Take 1 tablet (4 mg total) by mouth every 8 (eight) hours as needed for nausea, Starting Mon 1/21/2019, Print      sucralfate (CARAFATE) 1 g/10 mL suspension Take 10 mL (1 g total) by mouth 2 (two) times a day as needed (Abdominal pain), Starting Mon 1/21/2019, Print         CONTINUE these medications which have NOT CHANGED    Details   cloNIDine (CATAPRES) 0 1 mg tablet Take 0 1 mg by mouth , Until Discontinued, Historical Med      diphenhydrAMINE (BENADRYL) 25 mg tablet Take 25 mg by mouth 2 (two) times a day, Historical Med      divalproex sodium (DEPAKOTE ER) 250 mg 24 hr tablet Take 250 mg by mouth , Starting 7/25/2016, Until Discontinued, Historical Med      !! fluticasone (FLONASE) 50 mcg/act nasal spray 1 spray into each nostril daily, Starting 10/31/2016, Until Discontinued, Print      !! fluticasone (FLONASE) 50 mcg/act nasal spray 1 spray into each nostril daily, Starting Fri 1/4/2019, Print      pantoprazole (PROTONIX) 40 mg tablet Take 1 tablet (40 mg total) by mouth daily  , Starting 9/4/2016, Until Discontinued, Print      QUEtiapine (SEROquel) 200 mg tablet Take 200 mg by mouth , Starting 2/23/2016, Until Discontinued, Historical Med      venlafaxine (EFFEXOR-XR) 150 mg 24 hr capsule Take 150 mg by mouth , Starting 7/25/2016, Until Discontinued, Historical Med       !! - Potential duplicate medications found  Please discuss with provider  No discharge procedures on file  ED Provider  Attending physically available and evaluated Katerina Soto  I managed the patient along with the ED Attending      Electronically Signed by         Angela Ortiz MD  01/22/19 3637

## 2019-01-26 ENCOUNTER — HOSPITAL ENCOUNTER (EMERGENCY)
Facility: HOSPITAL | Age: 29
Discharge: HOME | End: 2019-01-26
Attending: EMERGENCY MEDICINE
Payer: COMMERCIAL

## 2019-01-26 VITALS
OXYGEN SATURATION: 98 % | DIASTOLIC BLOOD PRESSURE: 88 MMHG | WEIGHT: 167.99 LBS | HEART RATE: 87 BPM | RESPIRATION RATE: 17 BRPM | TEMPERATURE: 96.6 F | HEIGHT: 62 IN | BODY MASS INDEX: 30.91 KG/M2 | SYSTOLIC BLOOD PRESSURE: 162 MMHG

## 2019-01-26 DIAGNOSIS — S61.512A LACERATION OF SKIN OF LEFT WRIST, INITIAL ENCOUNTER: Primary | ICD-10-CM

## 2019-01-26 LAB
AMPHETAMINES SERPL QL SCN: NEGATIVE
ANION GAP SERPL CALCULATED.3IONS-SCNC: 5 MMOL/L (ref 5–14)
BARBITURATES UR QL: NEGATIVE
BENZODIAZ UR QL: NEGATIVE
BUN SERPL-MCNC: 13 MG/DL (ref 5–25)
CALCIUM SERPL-MCNC: 9.9 MG/DL (ref 8.4–10.2)
CHLORIDE SERPL-SCNC: 103 MMOL/L (ref 97–108)
CO2 SERPL-SCNC: 31 MMOL/L (ref 22–30)
COCAINE UR QL: NEGATIVE
CREAT SERPL-MCNC: 0.93 MG/DL (ref 0.7–1.5)
GFR SERPL CREATININE-BSD FRML MDRD: 128 ML/MIN/1.73SQ M
GLUCOSE SERPL-MCNC: 89 MG/DL (ref 70–99)
HCT VFR BLD AUTO: 50.1 % (ref 41–53)
HGB BLD-MCNC: 17.2 G/DL (ref 13.5–17.5)
METHADONE UR QL: NEGATIVE
OPIATES UR QL SCN: NEGATIVE
PCP UR QL: NEGATIVE
POTASSIUM SERPL-SCNC: 4.7 MMOL/L (ref 3.6–5)
SODIUM SERPL-SCNC: 139 MMOL/L (ref 137–147)
THC UR QL: NEGATIVE

## 2019-01-26 PROCEDURE — 36415 COLL VENOUS BLD VENIPUNCTURE: CPT | Performed by: EMERGENCY MEDICINE

## 2019-01-26 PROCEDURE — 80048 BASIC METABOLIC PNL TOTAL CA: CPT | Performed by: EMERGENCY MEDICINE

## 2019-01-26 PROCEDURE — 85018 HEMOGLOBIN: CPT | Performed by: EMERGENCY MEDICINE

## 2019-01-26 PROCEDURE — 80307 DRUG TEST PRSMV CHEM ANLYZR: CPT | Performed by: EMERGENCY MEDICINE

## 2019-01-26 PROCEDURE — 85014 HEMATOCRIT: CPT | Performed by: EMERGENCY MEDICINE

## 2019-01-26 PROCEDURE — 99284 EMERGENCY DEPT VISIT MOD MDM: CPT

## 2019-01-26 RX ORDER — BUPIVACAINE HYDROCHLORIDE 5 MG/ML
10 INJECTION, SOLUTION PERINEURAL ONCE
Status: COMPLETED | OUTPATIENT
Start: 2019-01-26 | End: 2019-01-26

## 2019-01-26 RX ADMIN — BUPIVACAINE HYDROCHLORIDE 10 ML: 5 INJECTION, SOLUTION EPIDURAL; INTRACAUDAL at 17:48
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
## 2019-01-26 NOTE — ED PROVIDER NOTES
History  Chief Complaint   Patient presents with    Wrist Laceration     patient states that he cut his left wrist with a piece of glass because "some girl was bullying me for my cigarettes" denies SI/HI/AH/VH     Patient is a 21-year-old [facility] [place] male who says he lacerated his left wrist accidentally on a bottle of glass that was broken  He did not have another ride to the hospital so he called the ambulance  Patient denies wanting to harm himself  Patient is a poor historian and appears to be mentally slow  Prior to Admission Medications   Prescriptions Last Dose Informant Patient Reported? Taking? QUEtiapine (SEROquel) 200 mg tablet   Yes No   Sig: Take 200 mg by mouth    cloNIDine (CATAPRES) 0 1 mg tablet   Yes No   Sig: Take 0 1 mg by mouth  diphenhydrAMINE (BENADRYL) 25 mg tablet   Yes No   Sig: Take 25 mg by mouth 2 (two) times a day   divalproex sodium (DEPAKOTE ER) 250 mg 24 hr tablet   Yes No   Sig: Take 250 mg by mouth  fluticasone (FLONASE) 50 mcg/act nasal spray   No No   Si spray into each nostril daily   fluticasone (FLONASE) 50 mcg/act nasal spray   No No   Si spray into each nostril daily   ondansetron (ZOFRAN-ODT) 4 mg disintegrating tablet   No No   Sig: Take 1 tablet (4 mg total) by mouth every 8 (eight) hours as needed for nausea   pantoprazole (PROTONIX) 40 mg tablet   No No   Sig: Take 1 tablet (40 mg total) by mouth daily  sucralfate (CARAFATE) 1 g/10 mL suspension   No No   Sig: Take 10 mL (1 g total) by mouth 2 (two) times a day as needed (Abdominal pain)   venlafaxine (EFFEXOR-XR) 150 mg 24 hr capsule   Yes No   Sig: Take 150 mg by mouth  Facility-Administered Medications: None       Past Medical History:   Diagnosis Date    Asthma     Bipolar 1 disorder ([facility] [address] )     Depression     Intellectual functioning disability     Intestinal disaccharidase deficiency     Mood swings        History reviewed  No pertinent surgical history      History reviewed  No pertinent family history  I have reviewed and agree with the history as documented  Social History   Substance Use Topics    Smoking status: Current Every Day Smoker     Packs/day: 0 20     Types: Cigarettes    Smokeless tobacco: Never Used    Alcohol use No        Review of Systems   Unable to perform ROS: Psychiatric disorder       Physical Exam  Physical Exam   Constitutional: He is oriented to person, place, and time  He appears well-developed and well-nourished  HENT:   Head: Normocephalic and atraumatic  Eyes: Pupils are equal, round, and reactive to light  Conjunctivae and EOM are normal    Neck: Normal range of motion  Neck supple  Cardiovascular: Normal rate, regular rhythm, normal heart sounds and intact distal pulses  Pulmonary/Chest: Effort normal and breath sounds normal    Abdominal: Soft  Bowel sounds are normal    Musculoskeletal: Normal range of motion  He exhibits no edema or deformity  Neurological: He is alert and oriented to person, place, and time  Skin: Skin is warm and dry  Capillary refill takes less than 2 seconds  Linear laceration to the left wrist at the wrist crease between the hand and forearm on the flexor surface 4 cm in length and 4 mm wide exposing the subcutaneous tissue below  There is no evidence of any tendon involvement or vascular involvement  Injuries intact distally  Psychiatric: He has a normal mood and affect   His behavior is normal  Judgment and thought content normal        Vital Signs  ED Triage Vitals [01/26/19 1711]   Temperature Pulse Respirations Blood Pressure SpO2   (!) 96 6 °F (35 9 °C) (!) 108 20 (!) 170/109 99 %      Temp Source Heart Rate Source Patient Position - Orthostatic VS BP Location FiO2 (%)   Tympanic Monitor Sitting Left arm --      Pain Score       9           Vitals:    01/26/19 1711 01/26/19 1853   BP: (!) 170/109 160/90   Pulse: (!) 108 103   Patient Position - Orthostatic VS: Sitting Sitting       Visual Acuity      ED Medications  Medications   bupivacaine (MARCAINE) 0 5 % injection 10 mL (10 mL Infiltration Given by Other 1/26/19 3769)       Diagnostic Studies  Results Reviewed     Procedure Component Value Units Date/Time    Rapid drug screen, urine [[id number]]  (Normal) Collected:  01/26/19 1817    Lab Status:  Final result Specimen:  Urine from Urine, Other Updated:  01/26/19 1844     Amph/Meth UR Negative     Barbiturate Ur Negative     Benzodiazepine Urine Negative     Cocaine Urine Negative     Methadone Urine Negative     Opiate Urine Negative     PCP Ur Negative     THC Urine Negative    Narrative:         FOR MEDICAL PURPOSES ONLY  IF CONFIRMATION NEEDED PLEASE CONTACT THE LAB WITHIN 5 DAYS  Drug Screen Cutoff Levels:  AMPHETAMINE/METHAMPHETAMINES  1000 ng/mL  BARBITURATES     200 ng/mL  BENZODIAZEPINES     200 ng/mL  COCAINE      300 ng/mL  METHADONE      300 ng/mL  OPIATES      300 ng/mL  PHENCYCLIDINE     25 ng/mL  THC       50 ng/mL    Basic metabolic panel [[id number]]  (Abnormal) Collected:  01/26/19 1817    Lab Status:  Final result Specimen:  Blood from Arm, Right Updated:  01/26/19 1840     Sodium 139 mmol/L      Potassium 4 7 mmol/L      Chloride 103 mmol/L      CO2 31 (H) mmol/L      ANION GAP 5 mmol/L      BUN 13 mg/dL      Creatinine 0 93 mg/dL      Glucose 89 mg/dL      Calcium 9 9 mg/dL      eGFR 128 ml/min/1 73sq m     Narrative:         National Kidney Disease Education Program recommendations are as follows:  GFR calculation is accurate only with a steady state creatinine  Chronic Kidney disease less than 60 ml/min/1 73 sq  meters  Kidney failure less than 15 ml/min/1 73 sq  meters      Hemoglobin and hematocrit, blood [[id number]]  (Normal) Collected:  01/26/19 1817    Lab Status:  Final result Specimen:  Blood from Arm, Right Updated:  01/26/19 1825     Hemoglobin 17 2 g/dL      Hematocrit 50 1 %                  No orders to display              Procedures  Lac Repair  Date/Time: 1/26/2019 5:33 PM  Performed by: Sugey Jerez by: Britt Prado   Consent: Verbal consent obtained  Written consent not obtained  Risks and benefits: risks, benefits and alternatives were discussed  Consent given by: patient  Patient understanding: patient states understanding of the procedure being performed  Patient consent: the patient's understanding of the procedure matches consent given  Procedure consent: procedure consent matches procedure scheduled  Relevant documents: relevant documents present and verified  Site marked: the operative site was marked  Patient identity confirmed: verbally with patient  Time out: Immediately prior to procedure a "time out" was called to verify the correct patient, procedure, equipment, support staff and site/side marked as required  Body area: upper extremity  Location details: left wrist  Laceration length: 4 5 cm  Foreign bodies: unknown  Tendon involvement: none  Nerve involvement: none  Vascular damage: no  Anesthesia: local infiltration    Anesthesia:  Local Anesthetic: bupivacaine 0 25% without epinephrine    Sedation:  Patient sedated: no    Wound Dehiscence:  Superficial Wound Dehiscence: simple closure      Procedure Details:  Preparation: Patient was prepped and draped in the usual sterile fashion  Irrigation solution: saline  Amount of cleaning: standard  Debridement: none  Degree of undermining: none  Skin closure: 5-0 nylon  Approximation: close  Approximation difficulty: simple  Dressing: antibiotic ointment, 4x4 sterile gauze and gauze roll  Patient tolerance: Patient tolerated the procedure well with no immediate complications             Phone Contacts  ED Phone Contact    ED Course                               MDM  Number of Diagnoses or Management Options  Laceration of skin of left wrist, initial encounter:   Diagnosis management comments: Patient is a poor historian with history of mental health issues    Patient has a superficial laceration to his left wrist allegedly from a glass bottle  Patient claims this injury was accidental however he has a history of mental health issues and is not reliable  And the crisis team will add be asked to evaluate the patient for possible risks of self-harm  Differential includes mental health issues and self-harm as well as the minor trauma  Pulse down to 80 at the time of my long evaluation and after his laceration was repaired  Risk of Complications, Morbidity, and/or Mortality  Presenting problems: moderate  Diagnostic procedures: minimal  Management options: low    Patient Progress  Patient progress: stable    CritCare Time    Disposition  Final diagnoses:   Laceration of skin of left wrist, initial encounter     Time reflects when diagnosis was documented in both MDM as applicable and the Disposition within this note     Time User Action Codes Description Comment    1/26/2019  7:33 PM Akila Jenkins Add [P37 300Y] Laceration of skin of left wrist, initial encounter       ED Disposition     ED Disposition Condition Comment    Discharge  Monae Carbajal discharge to home/self care  Condition at discharge: Good        Follow-up Information     Follow up With Specialties Details [address], [facility]  Nurse Practitioner Call As needed [address] [facility] [address]  [phone]            Patient's Medications   Discharge Prescriptions    No medications on file     No discharge procedures on file      ED Provider  Electronically Signed by           Dianna Melgoza MD  01/26/19 4747

## 2019-01-26 NOTE — ED NOTES
Patient came in with a slit to left wrist  Wound was cleaned with  Dermal wound cleanser and 4x4 place on it until DR sees patient  bleeding control at present and patient is comfortable       Felipa Pope  01/26/19 8031

## 2019-01-27 NOTE — DISCHARGE INSTRUCTIONS
Laceration   WHAT YOU NEED TO KNOW:   A laceration is an injury to the skin and the soft tissue underneath it  Lacerations happen when you are cut or hit by something  They can happen anywhere on the body  DISCHARGE INSTRUCTIONS:   Return to the emergency department if:   · You have heavy bleeding or bleeding that does not stop after 10 minutes of holding firm, direct pressure over the wound  · Your wound opens up  Contact your healthcare provider if:   · You have a fever or chills  · Your laceration is red, warm, or swollen  · You have red streaks on your skin coming from your wound  · You have white or yellow drainage from the wound that smells bad  · You have pain that gets worse, even after treatment  · You have questions or concerns about your condition or care  Medicines:   · Prescription pain medicine  may be given  Ask how to take this medicine safely  · Antibiotics  help treat or prevent a bacterial infection  · Take your medicine as directed  Contact your healthcare provider if you think your medicine is not helping or if you have side effects  Tell him or her if you are allergic to any medicine  Keep a list of the medicines, vitamins, and herbs you take  Include the amounts, and when and why you take them  Bring the list or the pill bottles to follow-up visits  Carry your medicine list with you in case of an emergency  Care for your wound as directed:   · Do not get your wound wet  until your healthcare provider says it is okay  Do not soak your wound in water  Do not go swimming until your healthcare provider says it is okay  Carefully wash the wound with soap and water  Gently pat the area dry or allow it to air dry  · Change your bandages  when they get wet, dirty, or after washing  Apply new, clean bandages as directed  Do not apply elastic bandages or tape too tight  Do not put powders or lotions over your incision  · Apply antibiotic ointment as directed  Your healthcare provider may give you antibiotic ointment to put over your wound if you have stitches  If you have strips of tape over your incision, let them dry up and fall off on their own  If they do not fall off within 14 days, gently remove them  If you have glue over your wound, do not remove or pick at it  If your glue comes off, do not replace it with glue that you have at home  · Check your wound every day for signs of infection such as swelling, redness, or pus  Self-care:   · Apply ice  on your wound for 15 to 20 minutes every hour or as directed  Use an ice pack, or put crushed ice in a plastic bag  Cover it with a towel  Ice helps prevent tissue damage and decreases swelling and pain  · Use a splint as directed  A splint will decrease movement and stress on your wound  It may help it heal faster  A splint may be used for lacerations over joints or areas of your body that bend  Ask your healthcare provider how to apply and remove a splint  · Decrease scarring of your wound  by applying ointments as directed  Do not apply ointments until your healthcare provider says it is okay  You may need to wait until your wound is healed  Ask which ointment to buy and how often to use it  After your wound is healed, use sunscreen over the area when you are out in the sun  You should do this for at least 6 months to 1 year after your injury  Follow up with your healthcare provider as directed: You may need to follow up in 24 to 48 hours to have your wound checked for infection  You will need to return in 3 to 14 days if you have stitches or staples so they can be removed  Care for your wound as directed to prevent infection and help it heal  Write down your questions so you remember to ask them during your visits  © 2017 [facility] Information is for End User's use only and may not be sold, redistributed or otherwise used for commercial purposes   All illustrations and images included in [facility] are the copyrighted property of A D A M , Inc  or [employer]  The above information is an  only  It is not intended as medical advice for individual conditions or treatments  Talk to your doctor, nurse or pharmacist before following any medical regimen to see if it is safe and effective for you    Sutures out in about 7-10 days

## 2019-02-04 ENCOUNTER — HOSPITAL ENCOUNTER (EMERGENCY)
Facility: HOSPITAL | Age: 29
Discharge: HOME | End: 2019-02-04
Attending: EMERGENCY MEDICINE | Admitting: EMERGENCY MEDICINE
Payer: COMMERCIAL

## 2019-02-04 VITALS
OXYGEN SATURATION: 97 % | HEART RATE: 111 BPM | BODY MASS INDEX: 32.06 KG/M2 | RESPIRATION RATE: 18 BRPM | WEIGHT: 175.27 LBS | SYSTOLIC BLOOD PRESSURE: 157 MMHG | TEMPERATURE: 96.9 F | DIASTOLIC BLOOD PRESSURE: 98 MMHG

## 2019-02-04 DIAGNOSIS — K59.00 CONSTIPATED: ICD-10-CM

## 2019-02-04 DIAGNOSIS — R10.9 ABDOMINAL DISCOMFORT: Primary | ICD-10-CM

## 2019-02-04 PROCEDURE — 99284 EMERGENCY DEPT VISIT MOD MDM: CPT

## 2019-02-04 RX ORDER — MAGNESIUM CARB/ALUMINUM HYDROX 105-160MG
296 TABLET,CHEWABLE ORAL ONCE
Status: COMPLETED | OUTPATIENT
Start: 2019-02-04 | End: 2019-02-04

## 2019-02-04 RX ADMIN — MAGESIUM CITRATE 296 ML: 1.75 LIQUID ORAL at 21:33

## 2019-02-05 NOTE — ED PROVIDER NOTES
History  Chief Complaint   Patient presents with    Abdominal Pain     Patient states he has generalized abdominal pain [place] 1 hour ago [place]  Denies vomiting and diarrhea  History provided by:  Patient  Abdominal Pain   Pain location:  Generalized  Pain quality: cramping and gnawing    Pain radiates to:  Does not radiate  Pain severity:  Moderate  Onset quality:  Gradual  Timing:  Constant  Progression:  Worsening  Chronicity:  New  Relieved by:  Nothing  Worsened by:  Nothing  Ineffective treatments:  None tried  Associated symptoms: constipation    Associated symptoms: no chest pain, no chills, no cough, no diarrhea, no dysuria, no fever, no nausea, no shortness of breath and no sore throat        Prior to Admission Medications   Prescriptions Last Dose Informant Patient Reported? Taking? cloNIDine (CATAPRES) 0 1 mg tablet 2019 at Unknown time  Yes Yes   Sig: Take 0 1 mg by mouth  divalproex sodium (DEPAKOTE ER) 250 mg 24 hr tablet   Yes No   Sig: Take 250 mg by mouth  fluticasone (FLONASE) 50 mcg/act nasal spray   No No   Si spray into each nostril daily   ondansetron (ZOFRAN-ODT) 4 mg disintegrating tablet   No No   Sig: Take 1 tablet (4 mg total) by mouth every 8 (eight) hours as needed for nausea   pantoprazole (PROTONIX) 40 mg tablet   No No   Sig: Take 1 tablet (40 mg total) by mouth daily  sucralfate (CARAFATE) 1 g/10 mL suspension   No No   Sig: Take 10 mL (1 g total) by mouth 2 (two) times a day as needed (Abdominal pain)      Facility-Administered Medications: None       Past Medical History:   Diagnosis Date    Asthma     Bipolar 1 disorder ([facility] )     Depression     Intellectual functioning disability     Intestinal disaccharidase deficiency     Mood swings     Schizo-affective schizophrenia ([facility] )        History reviewed  No pertinent surgical history  History reviewed  No pertinent family history    I have reviewed and agree with the history as documented  Social History   Substance Use Topics    Smoking status: Current Every Day Smoker     Packs/day: 1 00     Types: Cigarettes    Smokeless tobacco: Never Used    Alcohol use No        Review of Systems   Constitutional: Negative for chills and fever  HENT: Negative for rhinorrhea, sore throat and trouble swallowing  Eyes: Negative for pain  Respiratory: Negative for cough, shortness of breath, wheezing and stridor  Cardiovascular: Negative for chest pain and leg swelling  Gastrointestinal: Positive for abdominal pain and constipation  Negative for diarrhea and nausea  Endocrine: Negative for polyuria  Genitourinary: Negative for dysuria, flank pain and urgency  Musculoskeletal: Negative for joint swelling, myalgias and neck stiffness  Skin: Negative for rash  Allergic/Immunologic: Negative for immunocompromised state  Neurological: Negative for dizziness, syncope, weakness, numbness and headaches  Psychiatric/Behavioral: Negative for confusion and suicidal ideas  All other systems reviewed and are negative  Physical Exam  Physical Exam   Constitutional: He is oriented to person, place, and time  He appears well-developed and well-nourished  HENT:   Head: Normocephalic and atraumatic  Eyes: Pupils are equal, round, and reactive to light  EOM are normal    Neck: Normal range of motion  Neck supple  Cardiovascular: Normal rate and regular rhythm  Exam reveals no friction rub  No murmur heard  Pulmonary/Chest: Breath sounds normal  No respiratory distress  He has no wheezes  He has no rales  Abdominal: Soft  Bowel sounds are normal  He exhibits distension  There is no tenderness  Musculoskeletal: Normal range of motion  He exhibits no edema or tenderness  Neurological: He is alert and oriented to person, place, and time  Skin: Skin is warm  No rash noted  Psychiatric: He has a normal mood and affect  Nursing note and vitals reviewed        Vital Signs  ED Triage Vitals [02/04/19 2114]   Temperature Pulse Respirations Blood Pressure SpO2   (!) 96 9 °F (36 1 °C) (!) 111 18 157/98 97 %      Temp Source Heart Rate Source Patient Position - Orthostatic VS BP Location FiO2 (%)   Tympanic Monitor Lying Left arm --      Pain Score       9           Vitals:    02/04/19 2114   BP: 157/98   Pulse: (!) 111   Patient Position - Orthostatic VS: Lying       Visual Acuity      ED Medications  Medications   magnesium citrate (CITROMA) oral solution 296 mL (296 mL Oral Given 2/4/19 2133)       Diagnostic Studies  Results Reviewed     None                 No orders to display              Procedures  Procedures       Phone Contacts  ED Phone Contact    ED Course                               MDM  Number of Diagnoses or Management Options  Abdominal discomfort: new and requires workup  Constipated: new and requires workup  Diagnosis management comments: This is a 71-year-old male presents emergency department with abdominal distension, no tenderness to palpation in the emergency department patient has not had a bowel movement last 3 days he states he noted constipation difficulty having a bowel movement  Intermittent abdominal cramping some mild nausea associated with  Discussed with the patient has very poor oral water intake as well as poor diet intake as well  The plan will be for outpatient management followup given strict instructions when to return back to the emergency department dose of magnesium citrate as well  Pt re-examined and evaluated after testing and treatment  Spoke with the patient and feeling improved and sxs have resolved  Will discharge home with close f/u with pcp and instructed to return to the ED if sxs worsen or continue  Pt agrees with the plan for discharge and feels comfortable to go home with proper f/u  Advised to return for worsening or additional problems  Diagnostic tests were reviewed and questions answered   Diagnosis, care plan and treatment options were discussed  The patient understand instructions and will follow up as directed  Disposition  Final diagnoses:   Abdominal discomfort   Constipated     Time reflects when diagnosis was documented in both MDM as applicable and the Disposition within this note     Time User Action Codes Description Comment    2/4/2019  9:29 PM Christopher Dasilva Add [R10 9] Abdominal discomfort     2/4/2019  9:29 PM Christopher Dasilva Add [K59 00] Constipated       ED Disposition     ED Disposition Condition Date/Time Comment    Discharge  Mon Feb 4, 2019  9:30 PM Frida Wells discharge to home/self care  Condition at discharge: Stable        Follow-up Information     Follow up With Specialties Details Why Contact Info    [facility] Call in 2 days If symptoms worsen [address]  [phone]            Discharge Medication List as of 2/4/2019  9:30 PM      CONTINUE these medications which have NOT CHANGED    Details   cloNIDine (CATAPRES) 0 1 mg tablet Take 0 1 mg by mouth , Until Discontinued, Historical Med      divalproex sodium (DEPAKOTE ER) 250 mg 24 hr tablet Take 250 mg by mouth , Starting 7/25/2016, Until Discontinued, Historical Med      fluticasone (FLONASE) 50 mcg/act nasal spray 1 spray into each nostril daily, Starting Fri 1/4/2019, Print      ondansetron (ZOFRAN-ODT) 4 mg disintegrating tablet Take 1 tablet (4 mg total) by mouth every 8 (eight) hours as needed for nausea, Starting Mon 1/21/2019, Print      pantoprazole (PROTONIX) 40 mg tablet Take 1 tablet (40 mg total) by mouth daily  , Starting 9/4/2016, Until Discontinued, Print      sucralfate (CARAFATE) 1 g/10 mL suspension Take 10 mL (1 g total) by mouth 2 (two) times a day as needed (Abdominal pain), Starting Mon 1/21/2019, Print           No discharge procedures on file      ED Provider  Electronically Signed by           Oneida Cortez DO  02/04/19 7165

## 2019-02-05 NOTE — DISCHARGE INSTRUCTIONS
Abdominal Pain, Ambulatory Care   GENERAL INFORMATION:   Abdominal pain  can be dull, achy, or sharp  You may have pain in one area of your abdomen, or in your entire abdomen  Your pain may be caused by constipation, food sensitivity or poisoning, infection, or a blockage  Abdominal pain can also be caused by a hernia, appendicitis, or an ulcer  The cause of your abdominal pain may be unknown  Seek immediate care for the following symptoms:   · New chest pain or shortness of breath    · Pulsing pain in your upper abdomen or lower back that suddenly becomes constant    · Pain in the right lower abdominal area that worsens with movement    · Fever over 100 4°F (38°C) or shaking chills    · Vomiting and you cannot keep food or fluids down    · Pain does not improve or gets worse over the next 8 to 12 hours    · Blood in your vomit or bowel movements, or they look black and tarry    · Skin or the whites of your eyes turn yellow    · Large amount of vaginal bleeding that is not your monthly period  Treatment for abdominal pain  may include medicine to calm your stomach, prevent vomiting, or decrease pain  Follow up with your healthcare provider as directed:  Write down your questions so you remember to ask them during your visits  CARE AGREEMENT:   You have the right to help plan your care  Learn about your health condition and how it may be treated  Discuss treatment options with your caregivers to decide what care you want to receive  You always have the right to refuse treatment  The above information is an  only  It is not intended as medical advice for individual conditions or treatments  Talk to your doctor, nurse or pharmacist before following any medical regimen to see if it is safe and effective for you  © 2014 [address] is for End User's use only and may not be sold, redistributed or otherwise used for commercial purposes   All illustrations and images included in [facility] are the copyrighted property of A D A M , Inc  or [employer]  Abdominal Pain   WHAT YOU NEED TO KNOW:   Abdominal pain can be dull, achy, or sharp  You may have pain in one area of your abdomen, or in your entire abdomen  Your pain may be caused by a condition such as constipation, food sensitivity or poisoning, infection, or a blockage  Abdominal pain can also be from a hernia, appendicitis, or an ulcer  Liver, gallbladder, or kidney conditions can also cause abdominal pain  The cause of your abdominal pain may be unknown  DISCHARGE INSTRUCTIONS:   Return to the emergency department if:   · You have new chest pain or shortness of breath  · You have pulsing pain in your upper abdomen or lower back that suddenly becomes constant  · Your pain is in the right lower abdominal area and worsens with movement  · You have a fever over 100 4°F (38°C) or shaking chills  · You are vomiting and cannot keep food or liquids down  · Your pain does not improve or gets worse over the next 8 to 12 hours  · You see blood in your vomit or bowel movements, or they look black and tarry  · Your skin or the whites of your eyes turn yellow  · You are a woman and have a large amount of vaginal bleeding that is not your monthly period  Contact your healthcare provider if:   · You have pain in your lower back  · You are a man and have pain in your testicles  · You have pain when you urinate  · You have questions or concerns about your condition or care  Follow up with your healthcare provider within 24 hours or as directed:  Write down your questions so you remember to ask them during your visits  Medicines:   · Medicines  may be given to calm your stomach and prevent vomiting or to decrease pain  Ask how to take pain medicine safely  · Take your medicine as directed    Contact your healthcare provider if you think your medicine is not helping or if you have side effects  Tell him of her if you are allergic to any medicine  Keep a list of the medicines, vitamins, and herbs you take  Include the amounts, and when and why you take them  Bring the list or the pill bottles to follow-up visits  Carry your medicine list with you in case of an emergency  © 2017 [address] Information is for End User's use only and may not be sold, redistributed or otherwise used for commercial purposes  All illustrations and images included in CareNotes® are the copyrighted property of A D A M , Inc  or [employer]  The above information is an  only  It is not intended as medical advice for individual conditions or treatments  Talk to your doctor, nurse or pharmacist before following any medical regimen to see if it is safe and effective for you

## 2019-04-22 ENCOUNTER — APPOINTMENT (OUTPATIENT)
Dept: LAB | Facility: HOSPITAL | Age: 29
End: 2019-04-22
Payer: COMMERCIAL

## 2019-04-22 ENCOUNTER — TRANSCRIBE ORDERS (OUTPATIENT)
Dept: ADMINISTRATIVE | Facility: HOSPITAL | Age: 29
End: 2019-04-22

## 2019-04-22 DIAGNOSIS — F31.30 BIPOLAR I DISORDER, MOST RECENT EPISODE DEPRESSED (HCC): Primary | ICD-10-CM

## 2019-04-22 DIAGNOSIS — F31.30 BIPOLAR I DISORDER, MOST RECENT EPISODE DEPRESSED (HCC): ICD-10-CM

## 2019-04-22 LAB
ALBUMIN SERPL BCP-MCNC: 4.3 G/DL (ref 3–5.2)
ALP SERPL-CCNC: 80 U/L (ref 43–122)
ALT SERPL W P-5'-P-CCNC: 27 U/L (ref 9–52)
ANION GAP SERPL CALCULATED.3IONS-SCNC: 7 MMOL/L (ref 5–14)
AST SERPL W P-5'-P-CCNC: 42 U/L (ref 17–59)
BILIRUB SERPL-MCNC: 0.4 MG/DL
BUN SERPL-MCNC: 10 MG/DL (ref 5–25)
CALCIUM SERPL-MCNC: 10.1 MG/DL (ref 8.4–10.2)
CHLORIDE SERPL-SCNC: 99 MMOL/L (ref 97–108)
CHOLEST SERPL-MCNC: 110 MG/DL
CO2 SERPL-SCNC: 35 MMOL/L (ref 22–30)
CREAT SERPL-MCNC: 0.91 MG/DL (ref 0.7–1.5)
ERYTHROCYTE [DISTWIDTH] IN BLOOD BY AUTOMATED COUNT: 14 %
EST. AVERAGE GLUCOSE BLD GHB EST-MCNC: 108 MG/DL
GFR SERPL CREATININE-BSD FRML MDRD: 130 ML/MIN/1.73SQ M
GLUCOSE P FAST SERPL-MCNC: 88 MG/DL (ref 70–99)
HBA1C MFR BLD: 5.4 % (ref 4.2–6.3)
HCT VFR BLD AUTO: 53.4 % (ref 41–53)
HDLC SERPL-MCNC: 43 MG/DL (ref 40–59)
HGB BLD-MCNC: 17.9 G/DL (ref 13.5–17.5)
LDLC SERPL CALC-MCNC: 52 MG/DL
MCH RBC QN AUTO: 32.2 PG (ref 26–34)
MCHC RBC AUTO-ENTMCNC: 33.6 G/DL (ref 31–36)
MCV RBC AUTO: 96 FL (ref 80–100)
NONHDLC SERPL-MCNC: 67 MG/DL
PLATELET # BLD AUTO: 182 THOUSANDS/UL (ref 150–450)
PMV BLD AUTO: 9.6 FL (ref 8.9–12.7)
POTASSIUM SERPL-SCNC: 5.1 MMOL/L (ref 3.6–5)
PROT SERPL-MCNC: 7.4 G/DL (ref 5.9–8.4)
RBC # BLD AUTO: 5.57 MILLION/UL (ref 4.5–5.9)
SODIUM SERPL-SCNC: 141 MMOL/L (ref 137–147)
TRIGL SERPL-MCNC: 73 MG/DL
VALPROATE SERPL-MCNC: 106.9 UG/ML (ref 50–120)
WBC # BLD AUTO: 5 THOUSAND/UL (ref 4.5–11)

## 2019-04-22 PROCEDURE — 80164 ASSAY DIPROPYLACETIC ACD TOT: CPT

## 2019-04-22 PROCEDURE — 36415 COLL VENOUS BLD VENIPUNCTURE: CPT

## 2019-04-22 PROCEDURE — 80061 LIPID PANEL: CPT

## 2019-04-22 PROCEDURE — 85027 COMPLETE CBC AUTOMATED: CPT

## 2019-04-22 PROCEDURE — 80053 COMPREHEN METABOLIC PANEL: CPT

## 2019-04-22 PROCEDURE — 83036 HEMOGLOBIN GLYCOSYLATED A1C: CPT

## 2019-06-27 ENCOUNTER — HOSPITAL ENCOUNTER (EMERGENCY)
Facility: HOSPITAL | Age: 29
Discharge: HOME | End: 2019-06-27
Attending: EMERGENCY MEDICINE
Payer: COMMERCIAL

## 2019-06-27 VITALS
RESPIRATION RATE: 18 BRPM | WEIGHT: 176.37 LBS | OXYGEN SATURATION: 98 % | DIASTOLIC BLOOD PRESSURE: 107 MMHG | BODY MASS INDEX: 32.26 KG/M2 | SYSTOLIC BLOOD PRESSURE: 136 MMHG | TEMPERATURE: 98.5 F | HEART RATE: 101 BPM

## 2019-06-27 DIAGNOSIS — F25.9 SCHIZOAFFECTIVE DISORDER (HCC): ICD-10-CM

## 2019-06-27 DIAGNOSIS — F32.A DEPRESSION: Primary | ICD-10-CM

## 2019-06-27 DIAGNOSIS — F17.200 NICOTINE DEPENDENCE: ICD-10-CM

## 2019-06-27 PROCEDURE — 99283 EMERGENCY DEPT VISIT LOW MDM: CPT

## 2019-06-27 PROCEDURE — 99282 EMERGENCY DEPT VISIT SF MDM: CPT | Performed by: EMERGENCY MEDICINE

## 2019-06-27 NOTE — ED PROVIDER NOTES
History  Chief Complaint   Patient presents with    Psychiatric Evaluation     Pt brought in by [employer]  Per [employer] pt  walked up to the officer and told the officer that he has been starving himself over the last three dues do to growing depression  Pt  reports isolating himself in his apartment over the last two weeks  Pt  denies SI/HI/AH/VH     69-year-old male presents with police for evaluation of depression  Patient admits to increasing depression due to feelings of isolation and lack of friends  The patient states that he has not been eating for the past 3 days  The patie hallucinations on direct questioning  The patient does not feel that he needs to be hospitalized  The patient wishes to be discharged at the time of my evaluation  States that coming to the emergency department made him feel better  nt denies any SI, HI, A/V           Prior to Admission Medications   Prescriptions Last Dose Informant Patient Reported? Taking? cloNIDine (CATAPRES) 0 1 mg tablet   Yes Yes   Sig: Take 0 1 mg by mouth  divalproex sodium (DEPAKOTE ER) 250 mg 24 hr tablet   Yes Yes   Sig: Take 250 mg by mouth  fluticasone (FLONASE) 50 mcg/act nasal spray   No No   Si spray into each nostril daily   ondansetron (ZOFRAN-ODT) 4 mg disintegrating tablet   No No   Sig: Take 1 tablet (4 mg total) by mouth every 8 (eight) hours as needed for nausea   pantoprazole (PROTONIX) 40 mg tablet   No No   Sig: Take 1 tablet (40 mg total) by mouth daily  sucralfate (CARAFATE) 1 g/10 mL suspension   No No   Sig: Take 10 mL (1 g total) by mouth 2 (two) times a day as needed (Abdominal pain)      Facility-Administered Medications: None       Past Medical History:   Diagnosis Date    Asthma     Bipolar [facility] disorder ([facility] [address] [facility]     Depression     Intellectual functioning disability     Intestinal disaccharidase deficiency     Mood swings     Schizo-affective schizophrenia ([facility] [address] )        History reviewed   No pertinent surgical history  History reviewed  No pertinent family history  I have reviewed and agree with the history as documented  Social History     Tobacco Use    Smoking status: Current Every Day Smoker     Packs/day: 1 00     Types: Cigarettes    Smokeless tobacco: Never Used   Substance Use Topics    Alcohol use: No    Drug use: No        Review of Systems   Constitutional: Positive for appetite change ( Decreased)  Psychiatric/Behavioral: Positive for dysphoric mood  Negative for suicidal ideas  The patient is not nervous/anxious  All other systems reviewed and are negative  Physical Exam  Physical Exam   Constitutional: He is oriented to person, place, and time  He appears well-developed and well-nourished  No distress  HENT:   Head: Normocephalic and atraumatic  Right Ear: External ear normal    Left Ear: External ear normal    Mouth/Throat: No oropharyngeal exudate  Eyes: Pupils are equal, round, and reactive to light  EOM are normal  No scleral icterus  Neck: Normal range of motion  Neck supple  Cardiovascular: Normal rate, regular rhythm and normal heart sounds  Pulmonary/Chest: Effort normal and breath sounds normal  No respiratory distress  Abdominal: Soft  Bowel sounds are normal  There is no tenderness  There is no rebound and no guarding  Musculoskeletal: Normal range of motion  Neurological: He is alert and oriented to person, place, and time  Skin: Skin is warm and dry  No rash noted  Psychiatric: He is not actively hallucinating  Thought content is not paranoid  He exhibits a depressed mood  He expresses no homicidal and no suicidal ideation  Nursing note and vitals reviewed        Vital Signs  ED Triage Vitals [06/27/19 1722]   Temperature Pulse Respirations Blood Pressure SpO2   98 5 °F (36 9 °C) 101 18 (!) 136/107 98 %      Temp Source Heart Rate Source Patient Position - Orthostatic VS BP Location FiO2 (%)   Oral Monitor Sitting Left arm --      Pain Score       -- Vitals:    06/27/19 1722   BP: (!) 136/107   Pulse: 101   Patient Position - Orthostatic VS: Sitting         Visual Acuity      ED Medications  Medications - No data to display    Diagnostic Studies  Results Reviewed     None                 No orders to display              Procedures  Procedures       ED Course                               MDM  Number of Diagnoses or Management Options  Depression: new and requires workup  Nicotine dependence: new and requires workup  Schizoaffective disorder [facility]): minor  Diagnosis management comments: The patient does not meet criteria for involuntary commitment  He does not feel that he needs to be hospitalized at this time  The patient explicitly denies any suicidal homicidal ideation  The patient states that he is hungry and he is going to go home and eat at this time  I have reviewed and verified the patients tobacco usage in the History section in the patients chart  I have provided face-to-face tobacco cessation counseling for a period of time greater than 3 minutes but less than 10 minutes (INTERMEDIATE) with the patient, during which time the patient was alert, oriented, and receptive to counseling  The patient was offered the following interventions: cessation program(s) as he has tried the patch without success  The patient was also advised to follow up with his psychiatrist as many mental health medications can also be used for tobacco cessation             Amount and/or Complexity of Data Reviewed  Review and summarize past medical records: yes        Disposition  Final diagnoses:   Depression   Nicotine dependence   Schizoaffective disorder ([facility] [address] )     Time reflects when diagnosis was documented in both MDM as applicable and the Disposition within this note     Time User Action Codes Description Comment    6/27/2019  5:44 PM Branden Wilder Add [F32 9] Depression     6/27/2019  5:45 PM Branden Wilder Add [F17 200] Nicotine dependence     6/27/2019 5:45 PM Florin BARR Add [F25 9] Schizoaffective disorder [facility])       ED Disposition     ED Disposition Condition Date/Time Comment    Discharge Stable u Jun 27, 2019  5:44 PM Zaida Pun discharge to home/self care  Follow-up Information     Follow up With Specialties Details Why Contact Info    BRANDEE Morales Nurse Practitioner Schedule an appointment as soon as possible for a visit  For further evaluation [address] [facility] [address]  [phone]            Patient's Medications   Discharge Prescriptions    No medications on file     No discharge procedures on file      ED Provider  Electronically Signed by           Pete Fisher DO  06/27/19 7700

## 2019-10-21 ENCOUNTER — HOSPITAL ENCOUNTER (EMERGENCY)
Facility: HOSPITAL | Age: 29
Discharge: HOME | End: 2019-10-21
Attending: EMERGENCY MEDICINE | Admitting: EMERGENCY MEDICINE
Payer: COMMERCIAL

## 2019-10-21 VITALS
TEMPERATURE: 97.9 F | RESPIRATION RATE: 16 BRPM | BODY MASS INDEX: 32.26 KG/M2 | SYSTOLIC BLOOD PRESSURE: 155 MMHG | WEIGHT: 176.37 LBS | HEART RATE: 99 BPM | OXYGEN SATURATION: 97 % | DIASTOLIC BLOOD PRESSURE: 106 MMHG

## 2019-10-21 DIAGNOSIS — R45.4 ANGER REACTION: Primary | ICD-10-CM

## 2019-10-21 DIAGNOSIS — R03.0 ELEVATED BLOOD PRESSURE READING: ICD-10-CM

## 2019-10-21 LAB
ALBUMIN SERPL BCP-MCNC: 4.6 G/DL (ref 3–5.2)
ALP SERPL-CCNC: 82 U/L (ref 43–122)
ALT SERPL W P-5'-P-CCNC: 19 U/L (ref 9–52)
AMPHETAMINES SERPL QL SCN: NEGATIVE
ANION GAP SERPL CALCULATED.3IONS-SCNC: 8 MMOL/L (ref 5–14)
AST SERPL W P-5'-P-CCNC: 29 U/L (ref 17–59)
BARBITURATES UR QL: NEGATIVE
BENZODIAZ UR QL: NEGATIVE
BILIRUB SERPL-MCNC: 0.6 MG/DL
BILIRUB UR QL STRIP: NEGATIVE
BUN SERPL-MCNC: 15 MG/DL (ref 5–25)
CALCIUM SERPL-MCNC: 9.1 MG/DL (ref 8.4–10.2)
CHLORIDE SERPL-SCNC: 100 MMOL/L (ref 97–108)
CLARITY UR: CLEAR
CO2 SERPL-SCNC: 31 MMOL/L (ref 22–30)
COCAINE UR QL: NEGATIVE
COLOR UR: YELLOW
CREAT SERPL-MCNC: 0.9 MG/DL (ref 0.7–1.5)
EOSINOPHIL # BLD AUTO: 0.05 THOUSAND/UL (ref 0–0.4)
EOSINOPHIL NFR BLD MANUAL: 1 % (ref 0–6)
ERYTHROCYTE [DISTWIDTH] IN BLOOD BY AUTOMATED COUNT: 12.6 %
ETHANOL SERPL-MCNC: <10 MG/DL (ref 0–10)
GFR SERPL CREATININE-BSD FRML MDRD: 132 ML/MIN/1.73SQ M
GLUCOSE SERPL-MCNC: 70 MG/DL (ref 70–99)
GLUCOSE UR STRIP-MCNC: NEGATIVE MG/DL
HCT VFR BLD AUTO: 45.1 % (ref 41–53)
HGB BLD-MCNC: 15.7 G/DL (ref 13.5–17.5)
HGB UR QL STRIP.AUTO: NEGATIVE
KETONES UR STRIP-MCNC: NEGATIVE MG/DL
LEUKOCYTE ESTERASE UR QL STRIP: NEGATIVE
LYMPHOCYTES # BLD AUTO: 2.97 THOUSAND/UL (ref 0.5–4)
LYMPHOCYTES # BLD AUTO: 56 % (ref 25–45)
MAGNESIUM SERPL-MCNC: 1.7 MG/DL (ref 1.6–2.3)
MCH RBC QN AUTO: 32.9 PG (ref 26–34)
MCHC RBC AUTO-ENTMCNC: 34.8 G/DL (ref 31–36)
MCV RBC AUTO: 94 FL (ref 80–100)
METHADONE UR QL: NEGATIVE
MONOCYTES # BLD AUTO: 0.64 THOUSAND/UL (ref 0.2–0.9)
MONOCYTES NFR BLD AUTO: 12 % (ref 1–10)
NEUTS SEG # BLD: 1.64 THOUSAND/UL (ref 1.8–7.8)
NEUTS SEG NFR BLD AUTO: 31 %
NITRITE UR QL STRIP: NEGATIVE
OPIATES UR QL SCN: NEGATIVE
PCP UR QL: NEGATIVE
PH UR STRIP.AUTO: 6 [PH]
PLATELET # BLD AUTO: 205 THOUSANDS/UL (ref 150–450)
PLATELET BLD QL SMEAR: ADEQUATE
PMV BLD AUTO: 8.5 FL (ref 8.9–12.7)
POTASSIUM SERPL-SCNC: 4.4 MMOL/L (ref 3.6–5)
PROT SERPL-MCNC: 7.8 G/DL (ref 5.9–8.4)
PROT UR STRIP-MCNC: NEGATIVE MG/DL
RBC # BLD AUTO: 4.78 MILLION/UL (ref 4.5–5.9)
RBC MORPH BLD: NORMAL
SODIUM SERPL-SCNC: 139 MMOL/L (ref 137–147)
SP GR UR STRIP.AUTO: 1.01 (ref 1–1.04)
THC UR QL: NEGATIVE
TOTAL CELLS COUNTED SPEC: 100
UROBILINOGEN UA: NEGATIVE MG/DL
VALPROATE SERPL-MCNC: 81 UG/ML (ref 50–120)
WBC # BLD AUTO: 5.3 THOUSAND/UL (ref 4.5–11)

## 2019-10-21 PROCEDURE — 83735 ASSAY OF MAGNESIUM: CPT | Performed by: EMERGENCY MEDICINE

## 2019-10-21 PROCEDURE — 85007 BL SMEAR W/DIFF WBC COUNT: CPT | Performed by: EMERGENCY MEDICINE

## 2019-10-21 PROCEDURE — 99283 EMERGENCY DEPT VISIT LOW MDM: CPT | Performed by: EMERGENCY MEDICINE

## 2019-10-21 PROCEDURE — 80320 DRUG SCREEN QUANTALCOHOLS: CPT | Performed by: EMERGENCY MEDICINE

## 2019-10-21 PROCEDURE — 36415 COLL VENOUS BLD VENIPUNCTURE: CPT | Performed by: EMERGENCY MEDICINE

## 2019-10-21 PROCEDURE — 80307 DRUG TEST PRSMV CHEM ANLYZR: CPT | Performed by: EMERGENCY MEDICINE

## 2019-10-21 PROCEDURE — 99284 EMERGENCY DEPT VISIT MOD MDM: CPT

## 2019-10-21 PROCEDURE — 85027 COMPLETE CBC AUTOMATED: CPT | Performed by: EMERGENCY MEDICINE

## 2019-10-21 PROCEDURE — 80164 ASSAY DIPROPYLACETIC ACD TOT: CPT | Performed by: EMERGENCY MEDICINE

## 2019-10-21 PROCEDURE — 80053 COMPREHEN METABOLIC PANEL: CPT | Performed by: EMERGENCY MEDICINE

## 2019-10-21 RX ORDER — OLANZAPINE 5 MG/1
5 TABLET, ORALLY DISINTEGRATING ORAL ONCE
Status: COMPLETED | OUTPATIENT
Start: 2019-10-21 | End: 2019-10-21

## 2019-10-21 RX ADMIN — OLANZAPINE 5 MG: 5 TABLET, ORALLY DISINTEGRATING ORAL at 16:38

## 2019-10-21 NOTE — ED NOTES
All belongings returned to pt upon discharge, paperwork signed by pt and pt left ED with all belongings       Alexandra Reese RN  10/21/19 3743

## 2019-10-21 NOTE — ED PROVIDER NOTES
History  Chief Complaint   Patient presents with    Medication Management     pt arrives by [employer] after beeing summoned by pt's   pt has become increasingly paranoid and is noncompliant with medications  Patient is a 50-year-old male brought in by police  Per police, patient has been having increase in agitation and paranoia  Patient denies this  Patient has been noncompliant with his medications  Patient has believe that he has been followed by multiple people  When police arrived, they found him with a metal wiliam in his hand swinging at  They stated took approximately 20 minutes for him to calm down  Patient does state that he was aggressive and agitated and hitting things because pupil made him mad  He refuses to tell me what happened that brought police there  In review of chart, patient does a history of intellectual disability, bipolar disorder as well as schizoaffective disorder  He does have a history of suicide attempts in the past   He has been admitted several times  He does live in in apartment where there has 24 hour care  History provided by:  Patient and police   used: No    Psychiatric Evaluation   Presenting symptoms: aggressive behavior, agitation and paranoid behavior    Patient accompanied by:  Jessy Wilde enforcement and caregiver  Degree of incapacity (severity): Moderate  Onset quality:  Gradual  Progression:  Worsening  Chronicity:  Recurrent  Context: noncompliance    Treatment compliance:  Unable to specify  Relieved by:  Nothing  Worsened by:  Nothing  Ineffective treatments:  None tried  Risk factors: hx of mental illness        Prior to Admission Medications   Prescriptions Last Dose Informant Patient Reported? Taking? cloNIDine (CATAPRES) 0 1 mg tablet   Yes No   Sig: Take 0 1 mg by mouth  divalproex sodium (DEPAKOTE ER) 250 mg 24 hr tablet   Yes No   Sig: Take 250 mg by mouth     fluticasone (FLONASE) 50 mcg/act nasal spray   No No Si spray into each nostril daily   ondansetron (ZOFRAN-ODT) 4 mg disintegrating tablet   No No   Sig: Take 1 tablet (4 mg total) by mouth every 8 (eight) hours as needed for nausea   pantoprazole (PROTONIX) 40 mg tablet   No No   Sig: Take 1 tablet (40 mg total) by mouth daily  sucralfate (CARAFATE) 1 g/10 mL suspension   No No   Sig: Take 10 mL (1 g total) by mouth 2 (two) times a day as needed (Abdominal pain)      Facility-Administered Medications: None       Past Medical History:   Diagnosis Date    Asthma     [facility]     Depression     Intellectual functioning disability     Intestinal disaccharidase deficiency     Mood swings     Schizo-affective schizophrenia ([facility] )        History reviewed  No pertinent surgical history  History reviewed  No pertinent family history  I have reviewed and agree with the history as documented  Social History     Tobacco Use    Smoking status: Current Every Day Smoker     Packs/day: 0 25     Types: Cigarettes    Smokeless tobacco: Never Used   Substance Use Topics    Alcohol use: No    Drug use: No        Review of Systems   Psychiatric/Behavioral: Positive for agitation and paranoia  Physical Exam  Physical Exam   Constitutional: He is oriented to person, place, and time  He appears well-developed and well-nourished  No distress  HENT:   Head: Normocephalic and atraumatic  Mouth/Throat: Oropharynx is clear and moist    Patient maintaining airway maintaining secretions  Uvula midline  No stridor  Eyes: Pupils are equal, round, and reactive to light  Conjunctivae and EOM are normal    Neck: Normal range of motion  Neck supple  Cardiovascular: Normal rate, regular rhythm, normal heart sounds and intact distal pulses  No murmur heard  Pulmonary/Chest: Effort normal and breath sounds normal  No stridor  No respiratory distress  Abdominal: Soft  Bowel sounds are normal  He exhibits no distension  There is no tenderness  Musculoskeletal: Normal range of motion  He exhibits no edema  Neurological: He is alert and oriented to person, place, and time  No cranial nerve deficit  No facial asymmetry  No ataxia  No slurred speech  Skin: Skin is warm  Capillary refill takes less than 2 seconds  He is not diaphoretic  Psychiatric: His mood appears anxious  His speech is delayed  He is hyperactive  Thought content is paranoid  He expresses no homicidal and no suicidal ideation  He expresses no suicidal plans and no homicidal plans  Nursing note and vitals reviewed  Vital Signs  ED Triage Vitals [10/21/19 1608]   Temperature Pulse Respirations Blood Pressure SpO2   97 9 °F (36 6 °C) 99 16 (!) 155/106 97 %      Temp Source Heart Rate Source Patient Position - Orthostatic VS BP Location FiO2 (%)   Tympanic Monitor Sitting Left arm --      Pain Score       --           Vitals:    10/21/19 1608   BP: (!) 155/106   Pulse: 99   Patient Position - Orthostatic VS: Sitting         Visual Acuity      ED Medications  Medications   OLANZapine (ZyPREXA ZYDIS) dispersible tablet 5 mg (5 mg Oral Given 10/21/19 1638)       Diagnostic Studies  Results Reviewed     Procedure Component Value Units Date/Time    Valproic acid level, total [[id number]]  (Normal) Collected:  10/21/19 1624    Lab Status:  Final result Specimen:  Blood from Arm, Right Updated:  10/21/19 1649     Valproic Acid, Total 81 0 ug/mL     Rapid drug screen, urine [[id number]]  (Normal) Collected:  10/21/19 1610    Lab Status:  Final result Specimen:  Urine, Clean Catch Updated:  10/21/19 1648     Amph/Meth UR Negative     Barbiturate Ur Negative     Benzodiazepine Urine Negative     Cocaine Urine Negative     Methadone Urine Negative     Opiate Urine Negative     PCP Ur Negative     THC Urine Negative    Narrative:       FOR MEDICAL PURPOSES ONLY  IF CONFIRMATION NEEDED PLEASE CONTACT THE LAB WITHIN 5 DAYS      Drug Screen Cutoff Levels:  AMPHETAMINE/METHAMPHETAMINES  1000 ng/mL  BARBITURATES     200 ng/mL  BENZODIAZEPINES     200 ng/mL  COCAINE      300 ng/mL  METHADONE      300 ng/mL  OPIATES      300 ng/mL  PHENCYCLIDINE     25 ng/mL  THC       50 ng/mL      Magnesium [[id number]]  (Normal) Collected:  10/21/19 1624    Lab Status:  Final result Specimen:  Blood from Arm, Right Updated:  10/21/19 1648     Magnesium 1 7 mg/dL     Narrative:       Hemolysis    Ethanol [[id number]]  (Normal) Collected:  10/21/19 1624    Lab Status:  Final result Specimen:  Blood Updated:  10/21/19 1648     Ethanol Lvl <10 mg/dL     Narrative:       Hemolysis    Comprehensive metabolic panel [[id number]]  (Abnormal) Collected:  10/21/19 1624    Lab Status:  Final result Specimen:  Blood from Arm, Right Updated:  10/21/19 1648     Sodium 139 mmol/L      Potassium 4 4 mmol/L      Chloride 100 mmol/L      CO2 31 mmol/L      ANION GAP 8 mmol/L      BUN 15 mg/dL      Creatinine 0 90 mg/dL      Glucose 70 mg/dL      Calcium 9 1 mg/dL      AST 29 U/L      ALT 19 U/L      Alkaline Phosphatase 82 U/L      Total Protein 7 8 g/dL      Albumin 4 6 g/dL      Total Bilirubin 0 60 mg/dL      eGFR 132 ml/min/1 73sq m     Narrative:       Hemolysis  National Kidney Disease Foundation guidelines for Chronic Kidney Disease (CKD):     Stage 1 with normal or high GFR (GFR > 90 mL/min/1 73 square meters)    Stage 2 Mild CKD (GFR = 60-89 mL/min/1 73 square meters)    Stage 3A Moderate CKD (GFR = 45-59 mL/min/1 73 square meters)    Stage 3B Moderate CKD (GFR = 30-44 mL/min/1 73 square meters)    Stage 4 Severe CKD (GFR = 15-29 mL/min/1 73 square meters)    Stage 5 End Stage CKD (GFR <15 mL/min/1 73 square meters)  Note: GFR calculation is accurate only with a steady state creatinine    CBC and differential [[id number]]  (Abnormal) Collected:  10/21/19 1609    Lab Status:  Final result Specimen:  Blood from Arm, Right Updated:  10/21/19 1636     WBC 5 30 Thousand/uL      RBC 4 78 Million/uL      Hemoglobin 15 7 g/dL Hematocrit 45 1 %      MCV 94 fL      MCH 32 9 pg      MCHC 34 8 g/dL      RDW 12 6 %      MPV 8 5 fL      Platelets 931 Thousands/uL     UA w Reflex to Microscopic [[id number]]  (Normal) Collected:  10/21/19 1610    Lab Status:  Final result Specimen:  Urine, Clean Catch Updated:  10/21/19 1632     Color, UA Yellow     Clarity, UA Clear     Specific Gravity, UA 1 015     pH, UA 6 0     Leukocytes, UA Negative     Nitrite, UA Negative     Protein, UA Negative mg/dl      Glucose, UA Negative mg/dl      Ketones, UA Negative mg/dl      Bilirubin, UA Negative     Blood, UA Negative     UROBILINOGEN UA Negative mg/dL     POCT alcohol breath test [[id number]]     Lab Status:  No result                  No orders to display              Procedures  Procedures       ED Course  ED Course as of Oct 21 1852   Mon Oct 21, 2019   1545 Patient is a 79-year-old male brought in by police  On exam patient refuses to talk about what happened today  He denies suicidal or homicidal ideation  He is cooperative and agreeable to take medication  Will give him Zyprexa  Staff member just arrived  Will have crisis evaluate patient as well      Portions of the record may have been created with voice recognition software  Occasional wrong word or "sound a like" substitutions may have occurred due to the inherent limitations of voice recognition software  Read the chart carefully and recognize, using context, where substitutions have occurred  1615 Patient unable to use BAT  Will add on ETOH      1648 Patient's labs including alcohol and rapid drug screen are negative  Patient medically stable  Pending valproic acid level  Pending crisis evaluation as well       1711 VA level stable  Pending Crisis evaluation      7292 Patient is remain, cooperative  He has no aggressive behaviors here  Staff who was arrived states that they did not wish to sign him in [id number] "he is just having a bad day"    There is no thoughts of homicidal ideation or suicidal ideation  Will have crisis evaluate patient  1832 Pending crisis evaluation      2515 Crisis evaluated patient  Patient stable for dc home  Patient was not aggressive towards staff  He punched to sign  He is not suicidal homicidal   He has been compliant with his medication  Will plan for dc and I discussed with staff member who is agreeable for safety at home                                  MDM    Disposition  Final diagnoses:   Anger reaction   Elevated blood pressure reading     Time reflects when diagnosis was documented in both MDM as applicable and the Disposition within this note     Time User Action Codes Description Comment    10/21/2019  6:49 PM Nicole Ruiz Add [R45 4] Anger reaction     10/21/2019  6:49 PM Nicole Ruiz Add [R03 0] Elevated blood pressure reading       ED Disposition     ED Disposition Condition Date/Time Comment    Discharge Stable Mon Oct 21, 2019  6:48 PM Soila Aguilar discharge to home/self care  Follow-up Information     Follow up With Specialties Details Why Contact Info    BRANDEE Crews Nurse Practitioner Schedule an appointment as soon as possible for a visit in 2 weeks  [address] [facility] [address] [place] [address]  [phone]            Patient's Medications   Discharge Prescriptions    No medications on file     No discharge procedures on file      ED Provider  Electronically Signed by           Kate Lara, DO  10/21/19 1560 [facility], DO  10/21/19 1128

## 2019-10-21 NOTE — ED NOTES
Patient is a 26 yr old special needs patient,  is followed by [facility]  He was brought to the Ed by police after he had an anger outburst at the home  He was pleasant and coopoerative in the ED, was not suicidal or homicidal   There is no overt psychosis    He is asking to go home       patient is followed by Dr Blaise Estrada at [facility] and has an appointment  tomorrow

## 2019-10-21 NOTE — ED NOTES
at bedside and states mother will be coming to petition to [phone] pt        Neli Aguilera RN  10/21/19 4567

## 2019-10-21 NOTE — ED NOTES
Worker from [facility] states pt has not been a threat to himself or anyone else  Pt has not been exhibiting psychotic behaviors outside of his baseline  Mother was contacted and does NOT feel it necessary to admit pt against his will  Pt has been calm and cooperative the entire time in ER  Dr Telma Lopez and Jasmyn Ridley made aware        Austin Smith RN  10/21/19 8845

## 2019-11-07 ENCOUNTER — HOSPITAL ENCOUNTER (EMERGENCY)
Facility: HOSPITAL | Age: 29
Discharge: HOME | End: 2019-11-07
Attending: EMERGENCY MEDICINE | Admitting: EMERGENCY MEDICINE
Payer: COMMERCIAL

## 2019-11-07 ENCOUNTER — APPOINTMENT (EMERGENCY)
Dept: RADIOLOGY | Facility: HOSPITAL | Age: 29
End: 2019-11-07
Payer: COMMERCIAL

## 2019-11-07 ENCOUNTER — APPOINTMENT (EMERGENCY)
Dept: CT IMAGING | Facility: HOSPITAL | Age: 29
End: 2019-11-07
Payer: COMMERCIAL

## 2019-11-07 VITALS
OXYGEN SATURATION: 99 % | SYSTOLIC BLOOD PRESSURE: 142 MMHG | RESPIRATION RATE: 16 BRPM | HEART RATE: 72 BPM | DIASTOLIC BLOOD PRESSURE: 88 MMHG | TEMPERATURE: 98.3 F

## 2019-11-07 DIAGNOSIS — J45.901 ASTHMA EXACERBATION: ICD-10-CM

## 2019-11-07 DIAGNOSIS — R51.9 HEADACHE: Primary | ICD-10-CM

## 2019-11-07 PROCEDURE — 99285 EMERGENCY DEPT VISIT HI MDM: CPT

## 2019-11-07 PROCEDURE — 71046 X-RAY EXAM CHEST 2 VIEWS: CPT

## 2019-11-07 PROCEDURE — 70450 CT HEAD/BRAIN W/O DYE: CPT

## 2019-11-07 PROCEDURE — 99284 EMERGENCY DEPT VISIT MOD MDM: CPT | Performed by: PHYSICIAN ASSISTANT

## 2019-11-07 RX ORDER — ALBUTEROL SULFATE 2.5 MG/3ML
SOLUTION RESPIRATORY (INHALATION)
Status: COMPLETED
Start: 2019-11-07 | End: 2019-11-07

## 2019-11-07 RX ORDER — METOCLOPRAMIDE 10 MG/1
10 TABLET ORAL ONCE
Status: COMPLETED | OUTPATIENT
Start: 2019-11-07 | End: 2019-11-07

## 2019-11-07 RX ORDER — ACETAMINOPHEN 500 MG
500 TABLET ORAL EVERY 6 HOURS PRN
Qty: 30 TABLET | Refills: 0 | Status: SHIPPED | OUTPATIENT
Start: 2019-11-07 | End: 2020-10-02

## 2019-11-07 RX ORDER — DIPHENHYDRAMINE HCL 25 MG
25 TABLET ORAL ONCE
Status: COMPLETED | OUTPATIENT
Start: 2019-11-07 | End: 2019-11-07

## 2019-11-07 RX ORDER — ALBUTEROL SULFATE 2.5 MG/3ML
5 SOLUTION RESPIRATORY (INHALATION) ONCE
Status: COMPLETED | OUTPATIENT
Start: 2019-11-07 | End: 2019-11-07

## 2019-11-07 RX ORDER — IBUPROFEN 400 MG/1
400 TABLET ORAL EVERY 6 HOURS PRN
Qty: 20 TABLET | Refills: 0 | Status: SHIPPED | OUTPATIENT
Start: 2019-11-07 | End: 2020-10-02

## 2019-11-07 RX ORDER — ACETAMINOPHEN 325 MG/1
650 TABLET ORAL ONCE
Status: COMPLETED | OUTPATIENT
Start: 2019-11-07 | End: 2019-11-07

## 2019-11-07 RX ORDER — ALBUTEROL SULFATE 90 UG/1
2 AEROSOL, METERED RESPIRATORY (INHALATION) EVERY 4 HOURS PRN
Qty: 1 INHALER | Refills: 0 | Status: SHIPPED | OUTPATIENT
Start: 2019-11-07 | End: 2020-10-02

## 2019-11-07 RX ADMIN — DIPHENHYDRAMINE HCL 25 MG: 25 TABLET ORAL at 14:41

## 2019-11-07 RX ADMIN — METOCLOPRAMIDE HYDROCHLORIDE 10 MG: 10 TABLET ORAL at 14:41

## 2019-11-07 RX ADMIN — ALBUTEROL SULFATE 5 MG: 2.5 SOLUTION RESPIRATORY (INHALATION) at 14:40

## 2019-11-07 RX ADMIN — ACETAMINOPHEN 650 MG: 325 TABLET ORAL at 14:41

## 2019-11-07 NOTE — ED PROVIDER NOTES
History  Chief Complaint   Patient presents with    Shortness of Breath     Patient arrived via EMS for SOB and a migraine  Patient is a 78-year-old male presents today with chief complaint of asthma exacerbation and headache  Patient reports he has never been intubated or admitted to the ICU for his asthma and notes he does not use albuterol on a daily basis  Patient reports he believes the whether to have exacerbated his asthma symptoms  Patient denies any numbness or tingling, weakness or focal neural deficits associated with his headache  Patient reports he does not see a neurologist for his headaches  History provided by:  Patient   used: No    Migraine   Location:  Frontal  Quality:  Aching  Severity:  Mild  Onset quality:  Gradual  Duration:  3 weeks  Timing:  Intermittent  Progression:  Waxing and waning  Chronicity:  Recurrent  Context:  Asthma exacerbation  Relieved by:  None tried  Worsened by:  None tried  Ineffective treatments:  None tried  Associated symptoms: cough ( dry), headaches and wheezing    Associated symptoms: no abdominal pain, no chest pain, no congestion, no ear pain, no fatigue, no fever, no nausea, no rash, no rhinorrhea, no shortness of breath, no sore throat and no vomiting        Prior to Admission Medications   Prescriptions Last Dose Informant Patient Reported? Taking? cloNIDine (CATAPRES) 0 1 mg tablet   Yes No   Sig: Take 0 1 mg by mouth  divalproex sodium (DEPAKOTE ER) 250 mg 24 hr tablet   Yes No   Sig: Take 250 mg by mouth  fluticasone (FLONASE) 50 mcg/act nasal spray   No No   Si spray into each nostril daily   ondansetron (ZOFRAN-ODT) 4 mg disintegrating tablet   No No   Sig: Take 1 tablet (4 mg total) by mouth every 8 (eight) hours as needed for nausea   pantoprazole (PROTONIX) 40 mg tablet   No No   Sig: Take 1 tablet (40 mg total) by mouth daily     sucralfate (CARAFATE) 1 g/10 mL suspension   No No   Sig: Take 10 mL (1 g total) by mouth 2 (two) times a day as needed (Abdominal pain)      Facility-Administered Medications: None       Past Medical History:   Diagnosis Date    Asthma     Bipolar 1 disorder ([facility] )     Depression     Intellectual functioning disability     Intestinal disaccharidase deficiency     Mood swings     Schizo-affective schizophrenia ([facility] )        History reviewed  No pertinent surgical history  History reviewed  No pertinent family history  I have reviewed and agree with the history as documented  Social History     Tobacco Use    Smoking status: Current Every Day Smoker     Packs/day: 0 20     Types: Cigarettes    Smokeless tobacco: Never Used   Substance Use Topics    Alcohol use: No    Drug use: No        Review of Systems   Constitutional: Negative for chills, fatigue and fever  HENT: Negative for congestion, ear pain, rhinorrhea and sore throat  Eyes: Negative for redness  Respiratory: Positive for cough ( dry) and wheezing  Negative for chest tightness and shortness of breath  Cardiovascular: Negative for chest pain and palpitations  Gastrointestinal: Negative for abdominal pain, nausea and vomiting  Genitourinary: Negative for dysuria and hematuria  Musculoskeletal: Negative  Skin: Negative for rash  Neurological: Positive for headaches  Negative for dizziness, syncope, light-headedness and numbness  Physical Exam  Physical Exam   Constitutional: He is oriented to person, place, and time  He appears well-developed and well-nourished  HENT:   Head: Normocephalic and atraumatic  Eyes: Pupils are equal, round, and reactive to light  Neck: Normal range of motion  Cardiovascular: Normal rate, regular rhythm and normal heart sounds  Pulmonary/Chest: Effort normal and breath sounds normal  He has no decreased breath sounds  He has no wheezes  He exhibits no mass and no tenderness     Patient in no respiratory distress, speaking in full sentences with no difficulty managing oral secretions  No wheezing auscultated in lung fields b/l   Abdominal: Soft  There is no tenderness  There is no guarding  Musculoskeletal:        Right lower leg: Normal  He exhibits no edema  Left lower leg: Normal  He exhibits no edema  Lymphadenopathy:     He has no cervical adenopathy  Neurological: He is alert and oriented to person, place, and time  No cranial nerve deficit  Skin: Skin is warm and dry  Capillary refill takes less than 2 seconds  Psychiatric: He has a normal mood and affect  Nursing note and vitals reviewed  Vital Signs  ED Triage Vitals   Temperature Pulse Respirations Blood Pressure SpO2   11/07/19 1340 11/07/19 1340 11/07/19 1340 11/07/19 1340 11/07/19 1340   98 3 °F (36 8 °C) 74 16 148/92 100 %      Temp Source Heart Rate Source Patient Position - Orthostatic VS BP Location FiO2 (%)   11/07/19 1340 11/07/19 1340 11/07/19 1340 11/07/19 1340 --   Tympanic Monitor Lying Left arm       Pain Score       11/07/19 1441       8           Vitals:    11/07/19 1340   BP: 148/92   Pulse: 74   Patient Position - Orthostatic VS: Lying         Visual Acuity      ED Medications  Medications   albuterol (2 5 mg/3 mL) 0 083 % inhalation solution **ADS Override Pull** (0 mg  Given to EMS 11/7/19 1444)   albuterol inhalation solution 5 mg (5 mg Nebulization Given 11/7/19 1440)   acetaminophen (TYLENOL) tablet 650 mg (650 mg Oral Given 11/7/19 1441)   metoclopramide (REGLAN) tablet 10 mg (10 mg Oral Given 11/7/19 1441)   diphenhydrAMINE (BENADRYL) tablet 25 mg (25 mg Oral Given 11/7/19 1441)       Diagnostic Studies  Results Reviewed     None                 XR chest 2 views   Final Result by Moni Sheehan MD (11/07 1532)      No acute cardiopulmonary disease  Workstation performed: [id number]         CT head without contrast   Final Result by Tashi Palacios MD (11/07 1540)      No acute intracranial abnormality                    Workstation performed: [id number]                    Procedures  Procedures       ED Course  ED Course as of Nov 07 1608   Thu Nov 07, 2019   1604 Patient reports complete resolution of symptoms  Patient was reexamined at this time and informed of laboratory and/or imaging results and was found to be stable for discharge  Return to emergency department criteria was reviewed with the patient who verbalized understanding and was agreeable to discharge and the treatment plan at this time  MDM  Number of Diagnoses or Management Options  Asthma exacerbation:   Headache:   Diagnosis management comments: Patient is a 65-year-old male presenting today complaining of a headache and asthma exacerbation  Patient arrived via EMS who gave an albuterol treatment which alleviated the patient's symptoms moderately however patient continues to report wheezing  No wheezing auscultated on lung exam however will give albuterol treatment for symptomatic relief  And obtain chest x-ray  Patient reports a migraine headache which is frontal in nature and is not associated with any focal neural deficits, numbness or tingling or vision loss  Patient reports he does not follow with a neurologist, recent CT scan obtained for a headache unable to be found will obtain a CT scan      Disposition  Final diagnoses:   Headache   Asthma exacerbation     Time reflects when diagnosis was documented in both MDM as applicable and the Disposition within this note     Time User Action Codes Description Comment    11/7/2019  4:05 PM Ninetta Crigler Add [R51] Headache     11/7/2019  4:05 PM Bryson Miranda, [address] Asthma exacerbation       ED Disposition     ED Disposition Condition Date/Time Comment    Discharge Good u Nov 7, 2019  4:05 PM Enzo Mckeon discharge to home/self care              Follow-up Information     Follow up With Specialties Details Why Contact Info    BRANDEE Benavidez Nurse Practitioner Schedule an appointment as soon as possible for a visit in 2 days  [address] [id number]  [phone]            Patient's Medications   Discharge Prescriptions    ACETAMINOPHEN (TYLENOL) 500 MG TABLET    Take 1 tablet (500 mg total) by mouth every 6 (six) hours as needed for mild pain       Start Date: 11/7/2019 End Date: --       Order Dose: 500 mg       Quantity: 30 tablet    Refills: 0    ALBUTEROL (PROVENTIL HFA,VENTOLIN HFA) 90 MCG/ACT INHALER    Inhale 2 puffs every 4 (four) hours as needed for wheezing       Start Date: 11/7/2019 End Date: --       Order Dose: 2 puffs       Quantity: 1 Inhaler    Refills: 0    IBUPROFEN (MOTRIN) 400 MG TABLET    Take 1 tablet (400 mg total) by mouth every 6 (six) hours as needed for moderate pain       Start Date: 11/7/2019 End Date: --       Order Dose: 400 mg       Quantity: 20 tablet    Refills: 0     No discharge procedures on file      ED Provider  Electronically Signed by           Renea Harper PA-C  11/07/19 0269

## 2019-11-22 ENCOUNTER — HOSPITAL ENCOUNTER (EMERGENCY)
Facility: HOSPITAL | Age: 29
Discharge: HOME | End: 2019-11-22
Attending: EMERGENCY MEDICINE | Admitting: EMERGENCY MEDICINE
Payer: COMMERCIAL

## 2019-11-22 VITALS
DIASTOLIC BLOOD PRESSURE: 107 MMHG | HEART RATE: 67 BPM | WEIGHT: 154.4 LBS | TEMPERATURE: 96.9 F | SYSTOLIC BLOOD PRESSURE: 170 MMHG | BODY MASS INDEX: 28.24 KG/M2 | OXYGEN SATURATION: 98 % | RESPIRATION RATE: 19 BRPM

## 2019-11-22 DIAGNOSIS — K30 GASTROINTESTINAL DISCOMFORT: Primary | ICD-10-CM

## 2019-11-22 PROCEDURE — 99283 EMERGENCY DEPT VISIT LOW MDM: CPT

## 2019-11-22 PROCEDURE — 99282 EMERGENCY DEPT VISIT SF MDM: CPT | Performed by: PHYSICIAN ASSISTANT

## 2019-11-22 RX ORDER — MAGNESIUM HYDROXIDE/ALUMINUM HYDROXICE/SIMETHICONE 120; 1200; 1200 MG/30ML; MG/30ML; MG/30ML
30 SUSPENSION ORAL EVERY 4 HOURS PRN
Status: DISCONTINUED | OUTPATIENT
Start: 2019-11-22 | End: 2019-11-22 | Stop reason: HOSPADM

## 2019-11-22 RX ORDER — LIDOCAINE HYDROCHLORIDE 20 MG/ML
15 SOLUTION OROPHARYNGEAL ONCE
Status: COMPLETED | OUTPATIENT
Start: 2019-11-22 | End: 2019-11-22

## 2019-11-22 RX ORDER — SIMETHICONE 80 MG
80 TABLET,CHEWABLE ORAL EVERY 6 HOURS PRN
Qty: 30 TABLET | Refills: 0 | Status: SHIPPED | OUTPATIENT
Start: 2019-11-22 | End: 2020-10-02

## 2019-11-22 RX ADMIN — Medication 25 MG: at 17:24

## 2019-11-22 RX ADMIN — LIDOCAINE HYDROCHLORIDE 15 ML: 20 SOLUTION ORAL; TOPICAL at 17:24

## 2019-11-22 RX ADMIN — ALUMINUM HYDROXIDE, MAGNESIUM HYDROXIDE, AND SIMETHICONE 30 ML: 200; 200; 20 SUSPENSION ORAL at 17:23

## 2019-11-22 NOTE — ED PROVIDER NOTES
History  Chief Complaint   Patient presents with    Abdominal Pain     Pt c/o abd pain  Patient guarding during triage  States "It started after I ate the cheese fries" around 1500  Has not attempted to eat or drink since pain began Denies N/V/D  No meds taken  Patient is a 79-year-old male who presents today with guardian for chief complaint of abdominal fluttering  Patient reports the sensation began after he ate cheese fries  Patient reports he has not attempted to eat or drink since the discomfort started however reports he has not had any nausea, vomiting or diarrhea associated with his symptoms  Patient denies any fevers or chills or abdominal surgical history in the past   Patient reports he has not taken any medications to alleviate his symptoms and reports he has no abdominal pain, only 'fluttering/shaking' sensation  Patient denies dysuria, hematuria, flank pain  Abdominal Cramping   Pain location:  Generalized  Pain quality comment:  'fluttering'  Pain radiates to:  Does not radiate  Pain severity:  No pain  Onset quality:  Gradual  Duration:  1 hour  Timing:  Constant  Progression:  Unchanged  Chronicity:  New  Relieved by:  None tried  Worsened by:  Nothing  Ineffective treatments:  None tried  Associated symptoms: no chest pain, no chills, no dysuria, no fatigue, no fever, no hematuria, no nausea, no shortness of breath, no sore throat and no vomiting        Prior to Admission Medications   Prescriptions Last Dose Informant Patient Reported? Taking?   acetaminophen (TYLENOL) 500 mg tablet   No No   Sig: Take 1 tablet (500 mg total) by mouth every 6 (six) hours as needed for mild pain   albuterol (PROVENTIL HFA,VENTOLIN HFA) 90 mcg/act inhaler   No No   Sig: Inhale 2 puffs every 4 (four) hours as needed for wheezing   cloNIDine (CATAPRES) 0 1 mg tablet   Yes No   Sig: Take 0 1 mg by mouth  divalproex sodium (DEPAKOTE ER) 250 mg 24 hr tablet   Yes No   Sig: Take 250 mg by mouth  fluticasone (FLONASE) 50 mcg/act nasal spray   No No   Si spray into each nostril daily   ibuprofen (MOTRIN) 400 mg tablet   No No   Sig: Take 1 tablet (400 mg total) by mouth every 6 (six) hours as needed for moderate pain   ondansetron (ZOFRAN-ODT) 4 mg disintegrating tablet   No No   Sig: Take 1 tablet (4 mg total) by mouth every 8 (eight) hours as needed for nausea   pantoprazole (PROTONIX) 40 mg tablet   No No   Sig: Take 1 tablet (40 mg total) by mouth daily  sucralfate (CARAFATE) 1 g/10 mL suspension   No No   Sig: Take 10 mL (1 g total) by mouth 2 (two) times a day as needed (Abdominal pain)      Facility-Administered Medications: None       Past Medical History:   Diagnosis Date    Asthma     [facility]ipolar 1 disorder ([facility] )     Depression     Intellectual functioning disability     Intestinal disaccharidase deficiency     Mood swings     Schizo-affective schizophrenia ([facility] )        History reviewed  No pertinent surgical history  History reviewed  No pertinent family history  I have reviewed and agree with the history as documented  Social History     Tobacco Use    Smoking status: Current Every Day Smoker     Packs/day: 0 20     Types: Cigarettes    Smokeless tobacco: Never Used   Substance Use Topics    Alcohol use: No    Drug use: No        Review of Systems   Constitutional: Negative for chills, fatigue and fever  HENT: Negative for congestion, ear pain, rhinorrhea and sore throat  Eyes: Negative for redness  Respiratory: Negative for chest tightness and shortness of breath  Cardiovascular: Negative for chest pain and palpitations  Gastrointestinal: Negative for abdominal pain, nausea and vomiting  Abdominal 'fluttering'   Genitourinary: Negative for dysuria and hematuria  Musculoskeletal: Negative  Skin: Negative for rash  Neurological: Negative for dizziness, syncope, light-headedness and numbness         Physical Exam  Physical Exam   Constitutional: He is oriented to person, place, and time  He appears well-developed and well-nourished  HENT:   Head: Normocephalic and atraumatic  Eyes: Pupils are equal, round, and reactive to light  Neck: Normal range of motion  Cardiovascular: Normal rate, regular rhythm and normal heart sounds  Pulmonary/Chest: Effort normal and breath sounds normal    Abdominal: Soft  Bowel sounds are normal  There is no tenderness  There is no guarding  Benign abdominal exam, no tenderness on palpation both light and deep  Normal bowel sounds auscultated in all 4 quadrants  Negative McBurney's point tenderness negative Long sign  Lymphadenopathy:     He has no cervical adenopathy  Neurological: He is alert and oriented to person, place, and time  Skin: Skin is warm and dry  Capillary refill takes less than 2 seconds  Psychiatric: He has a normal mood and affect  Nursing note and vitals reviewed  Vital Signs  ED Triage Vitals [11/22/19 1644]   Temperature Pulse Respirations Blood Pressure SpO2   (!) 96 9 °F (36 1 °C) 67 19 (!) 170/107 98 %      Temp Source Heart Rate Source Patient Position - Orthostatic VS BP Location FiO2 (%)   Tympanic Monitor Lying Left arm --      Pain Score       9           Vitals:    11/22/19 1644   BP: (!) 170/107   Pulse: 67   Patient Position - Orthostatic VS: Lying         Visual Acuity      ED Medications  Medications   diphenhydrAMINE (BENADRYL) oral liquid 25 mg (25 mg Oral Given 11/22/19 1724)   Lidocaine Viscous HCl (XYLOCAINE) 2 % mucosal solution 15 mL (15 mL Swish & Swallow Given 11/22/19 1724)       Diagnostic Studies  Results Reviewed     None                 No orders to display              Procedures  Procedures       ED Course  ED Course as of Nov 22 2256 Fri Nov 22, 2019   1751 Patient reports complete resolution of symptoms at this time  Patient was reexamined at this time and informed of laboratory and/or imaging results and was found to be stable for discharge  Return to emergency department criteria was reviewed with the patient who verbalized understanding and was agreeable to discharge and the treatment plan at this time  MDM    Disposition  Final diagnoses:   Gastrointestinal discomfort     Time reflects when diagnosis was documented in both MDM as applicable and the Disposition within this note     Time User Action Codes Description Comment    11/22/2019  5:52 PM Jerry Casper  Gastrointestinal discomfort       ED Disposition     ED Disposition Condition Date/Time Comment    Discharge Stable Fri Nov 22, 2019  5:52 PM Song Gastelum discharge to home/self care              Follow-up Information     Follow up With Specialties Details Why Contact Aldair Arriaza Nurse Practitioner Schedule an appointment as soon as possible for a visit  As needed [address] [facility] [address]  [phone]            Discharge Medication List as of 11/22/2019  5:54 PM      START taking these medications    Details   simethicone (MYLICON) 80 mg chewable tablet Chew 1 tablet (80 mg total) every 6 (six) hours as needed for flatulence, Starting Fri 11/22/2019, Print         CONTINUE these medications which have NOT CHANGED    Details   acetaminophen (TYLENOL) 500 mg tablet Take 1 tablet (500 mg total) by mouth every 6 (six) hours as needed for mild pain, Starting u 11/7/2019, Print      albuterol (PROVENTIL HFA,VENTOLIN HFA) 90 mcg/act inhaler Inhale 2 puffs every 4 (four) hours as needed for wheezing, Starting Thu 11/7/2019, Print      cloNIDine (CATAPRES) 0 1 mg tablet Take 0 1 mg by mouth , Until Discontinued, Historical Med      divalproex sodium (DEPAKOTE ER) 250 mg 24 hr tablet Take 250 mg by mouth , Starting 7/25/2016, Until Discontinued, Historical Med      fluticasone (FLONASE) 50 mcg/act nasal spray 1 spray into each nostril daily, Starting Fri 1/4/2019, Print      ibuprofen (MOTRIN) 400 mg tablet Take 1 tablet (400 mg total) by mouth every 6 (six) hours as needed for moderate pain, Starting Thu 11/7/2019, Print      ondansetron (ZOFRAN-ODT) 4 mg disintegrating tablet Take 1 tablet (4 mg total) by mouth every 8 (eight) hours as needed for nausea, Starting Mon 1/21/2019, Print      pantoprazole (PROTONIX) 40 mg tablet Take 1 tablet (40 mg total) by mouth daily  , Starting 9/4/2016, Until Discontinued, Print      sucralfate (CARAFATE) 1 g/10 mL suspension Take 10 mL (1 g total) by mouth 2 (two) times a day as needed (Abdominal pain), Starting Mon 1/21/2019, Print           No discharge procedures on file      ED Provider  Electronically Signed by           Jojo Childs PA-C  11/22/19 7901

## 2019-11-23 NOTE — ED ATTENDING ATTESTATION
I was the attending physician on duty at the time the patient visited the emergency department  The patient was evaluated and dispositioned by the APC  I was personally available for consultation  I am administratively signing the chart after the fact      Moses Brizuela MD

## 2019-12-05 ENCOUNTER — HOSPITAL ENCOUNTER (EMERGENCY)
Facility: HOSPITAL | Age: 29
Discharge: HOME | End: 2019-12-05
Attending: EMERGENCY MEDICINE | Admitting: EMERGENCY MEDICINE
Payer: COMMERCIAL

## 2019-12-05 ENCOUNTER — TRANSCRIBE ORDERS (OUTPATIENT)
Dept: ADMINISTRATIVE | Facility: HOSPITAL | Age: 29
End: 2019-12-05

## 2019-12-05 ENCOUNTER — APPOINTMENT (EMERGENCY)
Dept: RADIOLOGY | Facility: HOSPITAL | Age: 29
End: 2019-12-05
Payer: COMMERCIAL

## 2019-12-05 ENCOUNTER — HOSPITAL ENCOUNTER (OUTPATIENT)
Dept: RADIOLOGY | Facility: HOSPITAL | Age: 29
Discharge: HOME | End: 2019-12-05
Payer: COMMERCIAL

## 2019-12-05 VITALS
TEMPERATURE: 98.3 F | RESPIRATION RATE: 12 BRPM | BODY MASS INDEX: 27.16 KG/M2 | HEART RATE: 99 BPM | SYSTOLIC BLOOD PRESSURE: 143 MMHG | OXYGEN SATURATION: 97 % | DIASTOLIC BLOOD PRESSURE: 87 MMHG | WEIGHT: 148.5 LBS

## 2019-12-05 DIAGNOSIS — R46.89 AGGRESSIVE BEHAVIOR: Primary | ICD-10-CM

## 2019-12-05 DIAGNOSIS — R76.11 POSITIVE TB TEST: ICD-10-CM

## 2019-12-05 DIAGNOSIS — R76.11 POSITIVE TB TEST: Primary | ICD-10-CM

## 2019-12-05 DIAGNOSIS — S60.519A HAND ABRASION: ICD-10-CM

## 2019-12-05 PROCEDURE — 80307 DRUG TEST PRSMV CHEM ANLYZR: CPT | Performed by: EMERGENCY MEDICINE

## 2019-12-05 PROCEDURE — 82075 ASSAY OF BREATH ETHANOL: CPT | Performed by: EMERGENCY MEDICINE

## 2019-12-05 PROCEDURE — 71046 X-RAY EXAM CHEST 2 VIEWS: CPT

## 2019-12-05 PROCEDURE — 99283 EMERGENCY DEPT VISIT LOW MDM: CPT

## 2019-12-05 PROCEDURE — 99282 EMERGENCY DEPT VISIT SF MDM: CPT | Performed by: EMERGENCY MEDICINE

## 2019-12-05 NOTE — ED PROVIDER NOTES
History  Chief Complaint   Patient presents with    Psychiatric Evaluation     Brought in by [employer], they rec'd a call from  that patient wasn't himself  [employer] found him wandering the streets, noticed his right hand had blood on it  He admits to punching a wall  26year old male brought in by police for acting strangely and aggressive behavior  Patient is currently calm and cooperative  He has no complaints at this time  Upon initial presentation, patient noted to have dried blood on his right fist   Patient admits to punching a wall earlier because he was angry  He is adamantly denying suicidal or homicidal ideations at this time  Patient's cousin is at bedside and states that patient has never expressed any suicidal or homicidal ideations  Cousin is able to take him home  He is agreeable that patient is not a harm to himself or others around him  Prior to Admission Medications   Prescriptions Last Dose Informant Patient Reported? Taking?   acetaminophen (TYLENOL) 500 mg tablet   No No   Sig: Take 1 tablet (500 mg total) by mouth every 6 (six) hours as needed for mild pain   albuterol (PROVENTIL HFA,VENTOLIN HFA) 90 mcg/act inhaler   No No   Sig: Inhale 2 puffs every 4 (four) hours as needed for wheezing   cloNIDine (CATAPRES) 0 1 mg tablet   Yes No   Sig: Take 0 1 mg by mouth  divalproex sodium (DEPAKOTE ER) 250 mg 24 hr tablet   Yes No   Sig: Take 250 mg by mouth  fluticasone (FLONASE) 50 mcg/act nasal spray   No No   Si spray into each nostril daily   ibuprofen (MOTRIN) 400 mg tablet   No No   Sig: Take 1 tablet (400 mg total) by mouth every 6 (six) hours as needed for moderate pain   ondansetron (ZOFRAN-ODT) 4 mg disintegrating tablet   No No   Sig: Take 1 tablet (4 mg total) by mouth every 8 (eight) hours as needed for nausea   pantoprazole (PROTONIX) 40 mg tablet   No No   Sig: Take 1 tablet (40 mg total) by mouth daily     simethicone (MYLICON) 80 mg chewable tablet   No No   Sig: Chew 1 tablet (80 mg total) every 6 (six) hours as needed for flatulence   sucralfate (CARAFATE) 1 g/10 mL suspension   No No   Sig: Take 10 mL (1 g total) by mouth 2 (two) times a day as needed (Abdominal pain)      Facility-Administered Medications: None       Past Medical History:   Diagnosis Date    Asthma     Bipolar 1 disorder ([facility] )     Depression     Intellectual functioning disability     Intestinal disaccharidase deficiency     Mood swings     Schizo-affective schizophrenia ([facility] )        History reviewed  No pertinent surgical history  History reviewed  No pertinent family history  I have reviewed and agree with the history as documented  Social History     Tobacco Use    Smoking status: Current Every Day Smoker     Packs/day: 0 20     Types: Cigarettes    Smokeless tobacco: Never Used   Substance Use Topics    Alcohol use: No    Drug use: No        Review of Systems   Constitutional: Negative for chills, diaphoresis and fever  HENT: Negative for congestion and rhinorrhea  Eyes: Negative for pain and visual disturbance  Respiratory: Negative for cough, shortness of breath and wheezing  Cardiovascular: Negative for chest pain and leg swelling  Gastrointestinal: Negative for abdominal pain, diarrhea, nausea and vomiting  Genitourinary: Negative for difficulty urinating, dysuria, frequency and urgency  Musculoskeletal: Negative for back pain and neck pain  Right hand pain   Skin: Negative for color change and rash  Neurological: Negative for syncope, numbness and headaches  Psychiatric/Behavioral: Positive for behavioral problems  Negative for self-injury and suicidal ideas  All other systems reviewed and are negative  Physical Exam  Physical Exam   Constitutional: He is oriented to person, place, and time  He appears well-developed and well-nourished  HENT:   Head: Normocephalic and atraumatic     Eyes: Conjunctivae and EOM are normal    Neck: Normal range of motion  Neck supple  Cardiovascular: Normal rate and regular rhythm  Pulmonary/Chest: Effort normal and breath sounds normal  No respiratory distress  He has no wheezes  He has no rales  Abdominal: Soft  Bowel sounds are normal  There is no tenderness  There is no guarding  Musculoskeletal: Normal range of motion  He exhibits tenderness  He exhibits no edema  Small superficial abrasion noted to right 3rd knuckle  Mildly tender around abrasion  No wrist tenderness  Nontender and and now snuffbox  Neurovascularly intact distally  No pain with axial loading of thumb  Full range of motion of fingers without difficulty  Neurological: He is alert and oriented to person, place, and time  No cranial nerve deficit  Skin: Skin is warm  No erythema  Psychiatric: He has a normal mood and affect  His behavior is normal    Nursing note and vitals reviewed  Vital Signs  ED Triage Vitals [12/05/19 1702]   Temperature Pulse Respirations Blood Pressure SpO2   98 3 °F (36 8 °C) 99 12 143/87 97 %      Temp Source Heart Rate Source Patient Position - Orthostatic VS BP Location FiO2 (%)   Tympanic Monitor Sitting Left arm --      Pain Score       No Pain           Vitals:    12/05/19 1702   BP: 143/87   Pulse: 99   Patient Position - Orthostatic VS: Sitting         Visual Acuity      ED Medications  Medications - No data to display    Diagnostic Studies  Results Reviewed     Procedure Component Value Units Date/Time    Rapid drug screen, urine [[id number]]  (Normal) Collected:  12/05/19 1713    Lab Status:  Final result Specimen:  Urine, Clean Catch Updated:  12/05/19 1751     Amph/Meth UR Negative     Barbiturate Ur Negative     Benzodiazepine Urine Negative     Cocaine Urine Negative     Methadone Urine Negative     Opiate Urine Negative     PCP Ur Negative     THC Urine Negative    Narrative:       FOR MEDICAL PURPOSES ONLY     IF CONFIRMATION NEEDED PLEASE CONTACT THE LAB WITHIN 5 DAYS     Drug Screen Cutoff Levels:  AMPHETAMINE/METHAMPHETAMINES  1000 ng/mL  BARBITURATES     200 ng/mL  BENZODIAZEPINES     200 ng/mL  COCAINE      300 ng/mL  METHADONE      300 ng/mL  OPIATES      300 ng/mL  PHENCYCLIDINE     25 ng/mL  THC       50 ng/mL      POCT alcohol breath test [[id number]]  (Normal) Resulted:  12/05/19 1728    Lab Status:  Final result Updated:  12/05/19 1728     EXTBreath Alcohol 0 000                 No orders to display              Procedures  Procedures         ED Course                               MDM  Number of Diagnoses or Management Options  Aggressive behavior:   Hand abrasion:   Diagnosis management comments: 30-year-old male presenting with aggressive behavior and hand abrasion  Obtain x-ray but patient refused  Per chart review, patient's tetanus was updated on 11/2018  Patient's cousin states he will take him home and watch over him for the night  Patient is stable for discharge  Disposition  Final diagnoses:   Aggressive behavior   Hand abrasion     Time reflects when diagnosis was documented in both MDM as applicable and the Disposition within this note     Time User Action Codes Description Comment    12/5/2019  5:55 PM Cecy Sandoval Add [R46 89] Aggressive behavior     12/5/2019  5:56 PM Elo Evans Hand abrasion       ED Disposition     ED Disposition Condition Date/Time Comment    Discharge Stable Thu Dec 5, 2019  5:55 PM Rama Mackay discharge to home/self care  Follow-up Information     Follow up With Specialties Details Why Contact Info    BRANDEE Forte Nurse Practitioner   [id number] [place] [address]       [facility] Emergency Department Emergency Medicine  If symptoms worsen [address] [id number] [phone]          Patient's Medications   Discharge Prescriptions    No medications on file     No discharge procedures on file      ED Provider  Electronically Signed by           Amira Dudley DO  12/05/19 9326

## 2019-12-09 ENCOUNTER — HOSPITAL ENCOUNTER (EMERGENCY)
Facility: HOSPITAL | Age: 29
Discharge: HOME | End: 2019-12-09
Attending: EMERGENCY MEDICINE | Admitting: EMERGENCY MEDICINE
Payer: COMMERCIAL

## 2019-12-09 VITALS
SYSTOLIC BLOOD PRESSURE: 111 MMHG | TEMPERATURE: 96.4 F | RESPIRATION RATE: 20 BRPM | BODY MASS INDEX: 27.98 KG/M2 | WEIGHT: 153 LBS | OXYGEN SATURATION: 99 % | DIASTOLIC BLOOD PRESSURE: 68 MMHG | HEART RATE: 100 BPM

## 2019-12-09 DIAGNOSIS — T78.40XA ALLERGIC REACTION, INITIAL ENCOUNTER: Primary | ICD-10-CM

## 2019-12-09 PROCEDURE — 99283 EMERGENCY DEPT VISIT LOW MDM: CPT

## 2019-12-09 PROCEDURE — 99282 EMERGENCY DEPT VISIT SF MDM: CPT | Performed by: PHYSICIAN ASSISTANT

## 2019-12-09 RX ORDER — DIPHENHYDRAMINE HCL 25 MG
25 TABLET ORAL ONCE
Status: COMPLETED | OUTPATIENT
Start: 2019-12-09 | End: 2019-12-09

## 2019-12-09 RX ORDER — DIPHENHYDRAMINE HCL 25 MG
25 TABLET ORAL EVERY 6 HOURS
Qty: 20 TABLET | Refills: 0 | Status: SHIPPED | OUTPATIENT
Start: 2019-12-09 | End: 2020-10-02

## 2019-12-09 RX ADMIN — DIPHENHYDRAMINE HCL 25 MG: 25 TABLET ORAL at 20:39

## 2019-12-10 NOTE — ED PROVIDER NOTES
History  Chief Complaint   Patient presents with    Allergic Reaction     I ate shrimp and I'm allergic  Patient is a 76-year-old male presents today with chief complaint of rash and itching to bilateral cheeks  Patient reports he ate shrimp and rice at approximately 5:00 p m  Jem Carrillo Patient denies any lip or tongue swelling, difficulty breathing, shortness of breath, abdominal pain, nausea, vomiting, diarrhea  History provided by:  Patient  Rash   Location:  Face  Facial rash location:  L cheek and R cheek  Quality: itchiness and redness    Severity:  Mild  Onset quality:  Gradual  Duration:  3 hours  Timing:  Constant  Progression:  Unchanged  Chronicity:  New  Relieved by:  None tried  Worsened by:  Nothing  Ineffective treatments:  None tried  Associated symptoms: no abdominal pain, no fatigue, no fever, no nausea, no shortness of breath, no sore throat and not vomiting        Prior to Admission Medications   Prescriptions Last Dose Informant Patient Reported? Taking?   acetaminophen (TYLENOL) 500 mg tablet   No No   Sig: Take 1 tablet (500 mg total) by mouth every 6 (six) hours as needed for mild pain   albuterol (PROVENTIL HFA,VENTOLIN HFA) 90 mcg/act inhaler   No No   Sig: Inhale 2 puffs every 4 (four) hours as needed for wheezing   cloNIDine (CATAPRES) 0 1 mg tablet   Yes No   Sig: Take 0 1 mg by mouth  divalproex sodium (DEPAKOTE ER) 250 mg 24 hr tablet   Yes No   Sig: Take 250 mg by mouth  fluticasone (FLONASE) 50 mcg/act nasal spray   No No   Si spray into each nostril daily   ibuprofen (MOTRIN) 400 mg tablet   No No   Sig: Take 1 tablet (400 mg total) by mouth every 6 (six) hours as needed for moderate pain   ondansetron (ZOFRAN-ODT) 4 mg disintegrating tablet   No No   Sig: Take 1 tablet (4 mg total) by mouth every 8 (eight) hours as needed for nausea   pantoprazole (PROTONIX) 40 mg tablet   No No   Sig: Take 1 tablet (40 mg total) by mouth daily     simethicone (MYLICON) 80 mg chewable tablet   No No   Sig: Chew 1 tablet (80 mg total) every 6 (six) hours as needed for flatulence   sucralfate (CARAFATE) 1 g/10 mL suspension   No No   Sig: Take 10 mL (1 g total) by mouth 2 (two) times a day as needed (Abdominal pain)      Facility-Administered Medications: None       Past Medical History:   Diagnosis Date    Asthma     [facility]ipolar [facility] disorder ([facility] )     Depression     Intellectual functioning disability     Intestinal disaccharidase deficiency     Mood swings     Schizo-affective schizophrenia ([facility] )        History reviewed  No pertinent surgical history  History reviewed  No pertinent family history  I have reviewed and agree with the history as documented  Social History     Tobacco Use    Smoking status: Current Every Day Smoker     Packs/day: 0 20     Types: Cigarettes    Smokeless tobacco: Never Used   Substance Use Topics    Alcohol use: No    Drug use: No        Review of Systems   Constitutional: Negative for chills, fatigue and fever  HENT: Negative for congestion, ear pain, rhinorrhea and sore throat  Eyes: Negative for redness  Respiratory: Negative for chest tightness and shortness of breath  Cardiovascular: Negative for chest pain and palpitations  Gastrointestinal: Negative for abdominal pain, nausea and vomiting  Genitourinary: Negative for dysuria and hematuria  Musculoskeletal: Negative  Skin: Positive for rash  Neurological: Negative for dizziness, syncope, light-headedness and numbness  Physical Exam  Physical Exam   Constitutional: He is oriented to person, place, and time  He appears well-developed and well-nourished  Well-appearing male in no acute distress   HENT:   Head: Normocephalic and atraumatic  Very faint erythematous pruritic rash present on bilateral cheeks  Nonvesicular non sloughing  Not consistent with hives  Eyes: Pupils are equal, round, and reactive to light  Neck: Normal range of motion     Cardiovascular: Normal rate, regular rhythm and normal heart sounds  Pulmonary/Chest: Effort normal and breath sounds normal    Abdominal: Soft  There is no tenderness  There is no guarding  Lymphadenopathy:     He has no cervical adenopathy  Neurological: He is alert and oriented to person, place, and time  Skin: Skin is warm and dry  Capillary refill takes less than 2 seconds  Psychiatric: He has a normal mood and affect  Nursing note and vitals reviewed  Vital Signs  ED Triage Vitals [12/09/19 2028]   Temperature Pulse Respirations Blood Pressure SpO2   (!) 96 4 °F (35 8 °C) 100 20 111/68 99 %      Temp Source Heart Rate Source Patient Position - Orthostatic VS BP Location FiO2 (%)   Tympanic Monitor Sitting Left arm --      Pain Score       --           Vitals:    12/09/19 2028   BP: 111/68   Pulse: 100   Patient Position - Orthostatic VS: Sitting         Visual Acuity      ED Medications  Medications   diphenhydrAMINE (BENADRYL) tablet 25 mg (25 mg Oral Given 12/9/19 2039)       Diagnostic Studies  Results Reviewed     None                 No orders to display              Procedures  Procedures         ED Course  ED Course as of Dec 09 2325   Mon Dec 09, 2019   2046 Patient reports complete resolution of symptoms at this time  Patient was reexamined at this time and informed of laboratory and/or imaging results and was found to be stable for discharge  Return to emergency department criteria was reviewed with the patient who verbalized understanding and was agreeable to discharge and the treatment plan at this time  [facility]  Number of Diagnoses or Management Options  Diagnosis management comments: No evidence of anaphylaxis  Will treat patient's reported pruritic rash with Benadryl and monitor  Disposition  Final diagnoses:    Allergic reaction, initial encounter     Time reflects when diagnosis was documented in both MDM as applicable and the Disposition within this note Time User Action Codes Description Comment    12/9/2019  8:39 PM April Turcios Add [T78 [id number]] Allergic reaction, initial encounter       ED Disposition     ED Disposition Condition Date/Time Comment    Discharge Stable Mon Dec 9, 2019  8:39 PM Corrinne Pea discharge to home/self care  Follow-up Information     Follow up With Specialties Details Why Contact Kale Beck, [address] Radha Decker Nurse Practitioner Schedule an appointment as soon as possible for a visit  As needed [address] [facility] [address]  [phone]            Discharge Medication List as of 12/9/2019  8:46 PM      START taking these medications    Details   diphenhydrAMINE (BENADRYL) 25 mg tablet Take 1 tablet (25 mg total) by mouth every 6 (six) hours, Starting Mon 12/9/2019, Print         CONTINUE these medications which have NOT CHANGED    Details   acetaminophen (TYLENOL) 500 mg tablet Take 1 tablet (500 mg total) by mouth every 6 (six) hours as needed for mild pain, Starting Thu 11/7/2019, Print      albuterol (PROVENTIL HFA,VENTOLIN HFA) 90 mcg/act inhaler Inhale 2 puffs every 4 (four) hours as needed for wheezing, Starting Thu 11/7/2019, Print      cloNIDine (CATAPRES) 0 1 mg tablet Take 0 1 mg by mouth , Until Discontinued, Historical Med      divalproex sodium (DEPAKOTE ER) 250 mg 24 hr tablet Take 250 mg by mouth , Starting 7/25/2016, Until Discontinued, Historical Med      fluticasone (FLONASE) 50 mcg/act nasal spray 1 spray into each nostril daily, Starting Fri 1/4/2019, Print      ibuprofen (MOTRIN) 400 mg tablet Take 1 tablet (400 mg total) by mouth every 6 (six) hours as needed for moderate pain, Starting Thu 11/7/2019, Print      ondansetron (ZOFRAN-ODT) 4 mg disintegrating tablet Take 1 tablet (4 mg total) by mouth every 8 (eight) hours as needed for nausea, Starting Mon 1/21/2019, Print      pantoprazole (PROTONIX) 40 mg tablet Take 1 tablet (40 mg total) by mouth daily  , Starting 9/4/2016, Until Discontinued, Print      simethicone (MYLICON) 80 mg chewable tablet Chew 1 tablet (80 mg total) every 6 (six) hours as needed for flatulence, Starting Fri 11/22/2019, Print      sucralfate (CARAFATE) 1 g/10 mL suspension Take 10 mL (1 g total) by mouth 2 (two) times a day as needed (Abdominal pain), Starting Mon 1/21/2019, Print           No discharge procedures on file      ED Provider  Electronically Signed by           Josefa Quinonez PA-C  12/09/19 2522

## 2020-01-03 ENCOUNTER — HOSPITAL ENCOUNTER (EMERGENCY)
Facility: HOSPITAL | Age: 30
Discharge: HOME | End: 2020-01-03
Attending: EMERGENCY MEDICINE | Admitting: EMERGENCY MEDICINE
Payer: COMMERCIAL

## 2020-01-03 VITALS
DIASTOLIC BLOOD PRESSURE: 108 MMHG | RESPIRATION RATE: 20 BRPM | WEIGHT: 143.3 LBS | SYSTOLIC BLOOD PRESSURE: 176 MMHG | OXYGEN SATURATION: 98 % | HEIGHT: 62 IN | HEART RATE: 93 BPM | BODY MASS INDEX: 26.37 KG/M2

## 2020-01-03 DIAGNOSIS — Z00.8 ENCOUNTER FOR PSYCHOLOGICAL EVALUATION: Primary | ICD-10-CM

## 2020-01-03 DIAGNOSIS — I10 HYPERTENSION: ICD-10-CM

## 2020-01-03 PROCEDURE — 99284 EMERGENCY DEPT VISIT MOD MDM: CPT

## 2020-01-03 PROCEDURE — 80307 DRUG TEST PRSMV CHEM ANLYZR: CPT | Performed by: EMERGENCY MEDICINE

## 2020-01-03 PROCEDURE — 99282 EMERGENCY DEPT VISIT SF MDM: CPT | Performed by: EMERGENCY MEDICINE

## 2020-01-03 PROCEDURE — 90471 IMMUNIZATION ADMIN: CPT

## 2020-01-03 PROCEDURE — 90715 TDAP VACCINE 7 YRS/> IM: CPT | Performed by: EMERGENCY MEDICINE

## 2020-01-03 PROCEDURE — 82075 ASSAY OF BREATH ETHANOL: CPT | Performed by: EMERGENCY MEDICINE

## 2020-01-03 RX ORDER — MAGNESIUM HYDROXIDE/ALUMINUM HYDROXICE/SIMETHICONE 120; 1200; 1200 MG/30ML; MG/30ML; MG/30ML
15 SUSPENSION ORAL ONCE
Status: COMPLETED | OUTPATIENT
Start: 2020-01-03 | End: 2020-01-03

## 2020-01-03 RX ADMIN — TETANUS TOXOID, REDUCED DIPHTHERIA TOXOID AND ACELLULAR PERTUSSIS VACCINE, ADSORBED 0.5 ML: 5; 2.5; 8; 8; 2.5 SUSPENSION INTRAMUSCULAR at 14:19

## 2020-01-03 RX ADMIN — ALUMINUM HYDROXIDE, MAGNESIUM HYDROXIDE, AND SIMETHICONE 15 ML: 200; 200; 20 SUSPENSION ORAL at 14:25

## 2020-01-03 NOTE — ED NOTES
The patient does not require an inpatient admission  He will be discharged to outpatient care  His Lift  was present and will transport him home

## 2020-01-03 NOTE — ED NOTES
Pt cooperative, his CW at bedside and willing to take pt home who is cooperative and aware that he needs to be safe       Jia Yo RN  01/03/20 3642

## 2020-01-03 NOTE — ED PROVIDER NOTES
History  Chief Complaint   Patient presents with    Psychiatric Evaluation     brought in by [employer], pt cut wrist with small piece of  glass that was on street  pt states being angry, denies SI/HI, AH/VH     41-year-old gentleman presents for psychiatric evaluation  He is a history of schizophrenia and bipolar disorder  He reports has been getting upset because people taking advantage of him  Got very frustrated and took a small piece of glass and superficially cut his left wrist   Denies any thoughts of self-harm ongoing thoughts of suicide  He denies hallucinations, homicidal ideation, medical noncompliance, or any recent medical issues or concerns  Psychiatric Evaluation   Presenting symptoms: self-mutilation    Degree of incapacity (severity):  Mild  Onset quality:  Sudden  Timing:  Constant  Progression:  Resolved  Chronicity:  New  Treatment compliance: All of the time  Relieved by:  Nothing  Worsened by:  Nothing  Ineffective treatments:  None tried  Associated symptoms: no abdominal pain, no anxiety, no appetite change, no chest pain, no decreased need for sleep, not distractible, no euphoric mood, no fatigue, no feelings of worthlessness, no headaches, no hypersomnia, no hyperventilation, no insomnia, no irritability and no weight change        Prior to Admission Medications   Prescriptions Last Dose Informant Patient Reported? Taking?   acetaminophen (TYLENOL) 500 mg tablet   No No   Sig: Take 1 tablet (500 mg total) by mouth every 6 (six) hours as needed for mild pain   albuterol (PROVENTIL HFA,VENTOLIN HFA) 90 mcg/act inhaler   No No   Sig: Inhale 2 puffs every 4 (four) hours as needed for wheezing   cloNIDine (CATAPRES) 0 1 mg tablet   Yes No   Sig: Take 0 1 mg by mouth  diphenhydrAMINE (BENADRYL) 25 mg tablet   No No   Sig: Take 1 tablet (25 mg total) by mouth every 6 (six) hours   divalproex sodium (DEPAKOTE ER) 250 mg 24 hr tablet   Yes No   Sig: Take 250 mg by mouth     fluticasone (FLONASE) 50 mcg/act nasal spray   No No   Si spray into each nostril daily   ibuprofen (MOTRIN) 400 mg tablet   No No   Sig: Take 1 tablet (400 mg total) by mouth every 6 (six) hours as needed for moderate pain   ondansetron (ZOFRAN-ODT) 4 mg disintegrating tablet   No No   Sig: Take 1 tablet (4 mg total) by mouth every 8 (eight) hours as needed for nausea   pantoprazole (PROTONIX) 40 mg tablet   No No   Sig: Take 1 tablet (40 mg total) by mouth daily  simethicone (MYLICON) 80 mg chewable tablet   No No   Sig: Chew 1 tablet (80 mg total) every 6 (six) hours as needed for flatulence   sucralfate (CARAFATE) 1 g/10 mL suspension   No No   Sig: Take 10 mL (1 g total) by mouth 2 (two) times a day as needed (Abdominal pain)      Facility-Administered Medications: None       Past Medical History:   Diagnosis Date    Asthma     [facility] ([facility])     Depression     Intellectual functioning disability     Intestinal disaccharidase deficiency     Mood swings     [facility]        History reviewed  No pertinent surgical history  History reviewed  No pertinent family history  I have reviewed and agree with the history as documented  Social History     Tobacco Use    Smoking status: Current Every Day Smoker     Packs/day: 0 20     Types: Cigarettes    Smokeless tobacco: Never Used   Substance Use Topics    Alcohol use: No    Drug use: No        Review of Systems   Constitutional: Negative for activity change, appetite change, chills, fatigue, fever and irritability  HENT: Negative  Negative for congestion, postnasal drip, rhinorrhea, sinus pain, sore throat and trouble swallowing  Eyes: Negative  Respiratory: Negative  Cardiovascular: Negative for chest pain  Gastrointestinal: Negative for abdominal pain, constipation, diarrhea, nausea and vomiting  Endocrine: Negative  Genitourinary: Negative  Musculoskeletal: Negative    Negative for arthralgias, back pain and myalgias  Skin: Negative  Allergic/Immunologic: Negative  Neurological: Negative  Negative for headaches  Hematological: Negative  Psychiatric/Behavioral: Positive for self-injury  The patient is not nervous/anxious and does not have insomnia  Physical Exam  Physical Exam   Constitutional: He is oriented to person, place, and time  He appears well-developed and well-nourished  No distress  HENT:   Head: Normocephalic and atraumatic  Nose: Nose normal    Mouth/Throat: Oropharynx is clear and moist    Eyes: Pupils are equal, round, and reactive to light  Conjunctivae are normal    Neck: Neck supple  Cardiovascular: Normal rate, regular rhythm and normal heart sounds  Pulmonary/Chest: Effort normal and breath sounds normal  No respiratory distress  Abdominal: Soft  Bowel sounds are normal  He exhibits no distension  There is no tenderness  There is no guarding  Musculoskeletal: Normal range of motion  He exhibits no edema  Neurological: He is alert and oriented to person, place, and time  Skin: Skin is warm and dry  Capillary refill takes less than 2 seconds  He is not diaphoretic  Psychiatric: He has a normal mood and affect  His behavior is normal    Nursing note and vitals reviewed        Vital Signs  ED Triage Vitals [01/03/20 1331]   Temp Pulse Respirations Blood Pressure SpO2   -- 102 20 (!) 187/109 98 %      Temp src Heart Rate Source Patient Position - Orthostatic VS BP Location FiO2 (%)   -- -- -- Left arm --      Pain Score       No Pain           Vitals:    01/03/20 1331   BP: (!) 187/109   Pulse: 102         Visual Acuity      ED Medications  Medications   tetanus-diphtheria-acellular pertussis (BOOSTRIX) IM injection 0 5 mL (0 5 mL Intramuscular Given 1/3/20 1419)   aluminum-magnesium hydroxide-simethicone (MYLANTA) 200-200-20 mg/5 mL oral suspension 15 mL (15 mL Oral Given 1/3/20 1425)       Diagnostic Studies  Results Reviewed     Procedure Component Value Units Date/Time    Rapid drug screen, urine [[id number]]  (Normal) Collected:  01/03/20 1348    Lab Status:  Final result Specimen:  Urine, Clean Catch Updated:  01/03/20 1423     Amph/Meth UR Negative     Barbiturate Ur Negative     Benzodiazepine Urine Negative     Cocaine Urine Negative     Methadone Urine Negative     Opiate Urine Negative     PCP Ur Negative     THC Urine Negative    Narrative:       FOR MEDICAL PURPOSES ONLY  IF CONFIRMATION NEEDED PLEASE CONTACT THE LAB WITHIN 5 DAYS  Drug Screen Cutoff Levels:  AMPHETAMINE/METHAMPHETAMINES  1000 ng/mL  BARBITURATES     200 ng/mL  BENZODIAZEPINES     200 ng/mL  COCAINE      300 ng/mL  METHADONE      300 ng/mL  OPIATES      300 ng/mL  PHENCYCLIDINE     25 ng/mL  THC       50 ng/mL      POCT alcohol breath test [[id number]]     Lab Status:  No result                  No orders to display              Procedures  Procedures         ED Course                               MDM  Number of Diagnoses or Management Options  Encounter for psychological evaluation:   Hypertension:   Diagnosis management comments: 21-year-old gentleman presents for psychiatric evaluation  He reports he is taking his medications as prescribed and denies any thoughts of self-harm or suicide  He met with the crisis worker along with one of the patient's case workers  Patient is high functioning and lives in an apartment with his brother  He is able to contract for safety fifth been cleared for discharge home with close outpatient follow-up  He is aware of reasons return to the ER  Patient's repeat blood pressure was better but remained elevated  He does admit that he has a history of hypertension and he did not take his medication this morning as he was supposed to  He denies any symptoms related to this and plans to take his medication immediately upon returning home          Disposition  Final diagnoses:   Encounter for psychological evaluation   Hypertension Time reflects when diagnosis was documented in both MDM as applicable and the Disposition within this note     Time User Action Codes Description Comment    1/3/2020  2:16 PM Guera Miki Add [Z00 8] Encounter for psychological evaluation     1/3/2020  2:29 PM Ge Sandoval Hypertension       ED Disposition     ED Disposition Condition Date/Time Comment    Discharge Stable Fri Fede 3, 2020  2:16 PM Devyn Hernandez discharge to home/self care  Follow-up Information    None         Patient's Medications   Discharge Prescriptions    No medications on file     No discharge procedures on file      ED Provider  Electronically Signed by           Santy Rubio DO  01/03/20 1643

## 2020-01-03 NOTE — ED NOTES
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

## 2020-01-17 ENCOUNTER — APPOINTMENT (OUTPATIENT)
Dept: LAB | Facility: HOSPITAL | Age: 30
End: 2020-01-17
Payer: COMMERCIAL

## 2020-01-17 ENCOUNTER — TRANSCRIBE ORDERS (OUTPATIENT)
Dept: ADMINISTRATIVE | Facility: HOSPITAL | Age: 30
End: 2020-01-17

## 2020-01-17 DIAGNOSIS — Z79.899 ENCOUNTER FOR LONG-TERM (CURRENT) USE OF OTHER MEDICATIONS: Primary | ICD-10-CM

## 2020-01-17 DIAGNOSIS — Z79.899 ENCOUNTER FOR LONG-TERM (CURRENT) USE OF OTHER MEDICATIONS: ICD-10-CM

## 2020-01-17 LAB
ALBUMIN SERPL BCP-MCNC: 4.3 G/DL (ref 3–5.2)
ALP SERPL-CCNC: 67 U/L (ref 43–122)
ALT SERPL W P-5'-P-CCNC: 20 U/L (ref 9–52)
AMMONIA PLAS-SCNC: 11 UMOL/L (ref 9–33)
ANION GAP SERPL CALCULATED.3IONS-SCNC: 8 MMOL/L (ref 5–14)
AST SERPL W P-5'-P-CCNC: 26 U/L (ref 17–59)
BILIRUB SERPL-MCNC: 0.4 MG/DL
BUN SERPL-MCNC: 16 MG/DL (ref 5–25)
CALCIUM SERPL-MCNC: 9.6 MG/DL (ref 8.4–10.2)
CHLORIDE SERPL-SCNC: 101 MMOL/L (ref 97–108)
CHOLEST SERPL-MCNC: 141 MG/DL
CO2 SERPL-SCNC: 32 MMOL/L (ref 22–30)
CREAT SERPL-MCNC: 0.96 MG/DL (ref 0.7–1.5)
EOSINOPHIL # BLD AUTO: 0.09 THOUSAND/UL (ref 0–0.4)
EOSINOPHIL NFR BLD MANUAL: 2 % (ref 0–6)
ERYTHROCYTE [DISTWIDTH] IN BLOOD BY AUTOMATED COUNT: 12.9 %
GFR SERPL CREATININE-BSD FRML MDRD: 122 ML/MIN/1.73SQ M
GLUCOSE P FAST SERPL-MCNC: 92 MG/DL (ref 70–99)
HCT VFR BLD AUTO: 48.4 % (ref 41–53)
HDLC SERPL-MCNC: 55 MG/DL
HGB BLD-MCNC: 16.5 G/DL (ref 13.5–17.5)
LDLC SERPL CALC-MCNC: 74 MG/DL
LYMPHOCYTES # BLD AUTO: 2.42 THOUSAND/UL (ref 0.5–4)
LYMPHOCYTES # BLD AUTO: 55 % (ref 25–45)
MCH RBC QN AUTO: 32.7 PG (ref 26–34)
MCHC RBC AUTO-ENTMCNC: 34 G/DL (ref 31–36)
MCV RBC AUTO: 96 FL (ref 80–100)
MONOCYTES # BLD AUTO: 0.44 THOUSAND/UL (ref 0.2–0.9)
MONOCYTES NFR BLD AUTO: 10 % (ref 1–10)
NEUTS SEG # BLD: 1.45 THOUSAND/UL (ref 1.8–7.8)
NEUTS SEG NFR BLD AUTO: 33 %
NONHDLC SERPL-MCNC: 86 MG/DL
PLATELET # BLD AUTO: 206 THOUSANDS/UL (ref 150–450)
PLATELET BLD QL SMEAR: ADEQUATE
PMV BLD AUTO: 8.6 FL (ref 8.9–12.7)
POTASSIUM SERPL-SCNC: 4.5 MMOL/L (ref 3.6–5)
PROT SERPL-MCNC: 7.7 G/DL (ref 5.9–8.4)
RBC # BLD AUTO: 5.04 MILLION/UL (ref 4.5–5.9)
RBC MORPH BLD: NORMAL
SODIUM SERPL-SCNC: 141 MMOL/L (ref 137–147)
T4 SERPL-MCNC: 5.9 UG/DL (ref 4.7–13.3)
TOTAL CELLS COUNTED SPEC: 100
TRIGL SERPL-MCNC: 61 MG/DL
TSH SERPL DL<=0.05 MIU/L-ACNC: 2.62 UIU/ML (ref 0.47–4.68)
VALPROATE SERPL-MCNC: 126.2 UG/ML (ref 50–120)
WBC # BLD AUTO: 4.4 THOUSAND/UL (ref 4.5–11)

## 2020-01-17 PROCEDURE — 36415 COLL VENOUS BLD VENIPUNCTURE: CPT

## 2020-01-17 PROCEDURE — 80061 LIPID PANEL: CPT

## 2020-01-17 PROCEDURE — 85027 COMPLETE CBC AUTOMATED: CPT

## 2020-01-17 PROCEDURE — 80164 ASSAY DIPROPYLACETIC ACD TOT: CPT

## 2020-01-17 PROCEDURE — 85007 BL SMEAR W/DIFF WBC COUNT: CPT

## 2020-01-17 PROCEDURE — 80053 COMPREHEN METABOLIC PANEL: CPT

## 2020-01-17 PROCEDURE — 84436 ASSAY OF TOTAL THYROXINE: CPT

## 2020-01-17 PROCEDURE — 82140 ASSAY OF AMMONIA: CPT

## 2020-01-17 PROCEDURE — 84443 ASSAY THYROID STIM HORMONE: CPT

## 2020-01-21 ENCOUNTER — TRANSCRIBE ORDERS (OUTPATIENT)
Dept: LAB | Facility: HOSPITAL | Age: 30
End: 2020-01-21

## 2020-01-21 DIAGNOSIS — Z79.899 ENCOUNTER FOR LONG-TERM (CURRENT) USE OF MEDICATIONS: Primary | ICD-10-CM

## 2020-02-17 ENCOUNTER — HOSPITAL ENCOUNTER (EMERGENCY)
Facility: HOSPITAL | Age: 30
Discharge: HOME | End: 2020-02-17
Attending: EMERGENCY MEDICINE | Admitting: EMERGENCY MEDICINE
Payer: COMMERCIAL

## 2020-02-17 VITALS
DIASTOLIC BLOOD PRESSURE: 85 MMHG | OXYGEN SATURATION: 98 % | HEART RATE: 105 BPM | SYSTOLIC BLOOD PRESSURE: 150 MMHG | TEMPERATURE: 96.9 F | RESPIRATION RATE: 20 BRPM

## 2020-02-17 DIAGNOSIS — Z00.8 ENCOUNTER FOR PSYCHOLOGICAL EVALUATION: Primary | ICD-10-CM

## 2020-02-17 DIAGNOSIS — F91.9 BEHAVIOR DISTURBANCE: ICD-10-CM

## 2020-02-17 PROCEDURE — 80307 DRUG TEST PRSMV CHEM ANLYZR: CPT | Performed by: EMERGENCY MEDICINE

## 2020-02-17 PROCEDURE — 99282 EMERGENCY DEPT VISIT SF MDM: CPT | Performed by: EMERGENCY MEDICINE

## 2020-02-17 PROCEDURE — 82075 ASSAY OF BREATH ETHANOL: CPT | Performed by: EMERGENCY MEDICINE

## 2020-02-17 PROCEDURE — 99284 EMERGENCY DEPT VISIT MOD MDM: CPT

## 2020-02-17 RX ORDER — DIPHENHYDRAMINE HCL 25 MG
50 TABLET ORAL ONCE
Status: COMPLETED | OUTPATIENT
Start: 2020-02-17 | End: 2020-02-17

## 2020-02-17 RX ADMIN — DIPHENHYDRAMINE HCL 50 MG: 25 TABLET ORAL at 18:13

## 2020-02-17 NOTE — ED NOTES
Pt sitting on bed, cooperative and calm  Pt reminded to provide urine specimen        Lakshmi Cuevas RN  02/17/20 1054

## 2020-02-17 NOTE — ED PROVIDER NOTES
History  Chief Complaint   Patient presents with    Psychiatric Evaluation     Brought in by [employer] pt was found swinging a knife around outside  Pt states he was angry "it's for protection, [place] has bad killers"  "People lizzette't know how to stop fucking with me"  Pt denies SI/HI/AH/VS       Patient is a 54-year-old male with a history of bipolar id as well as schizophrenia presents via [place] police after multiple bystanders called for a gentleman brandishing a knife walking down the street  Patient denies any suicidal or homicidal ideations no auditory visual hallucinations  Patient states that he was swinging my from because he was angry because people or not respecting him  Patient states he is compliant with his medication and there was a recent adjustment  Has an acting did not call anyone today  Unwilling to cite any specific trigger or event that happened today  Denies any drug or alcohol use  Prior to Admission Medications   Prescriptions Last Dose Informant Patient Reported? Taking?   acetaminophen (TYLENOL) 500 mg tablet   No No   Sig: Take 1 tablet (500 mg total) by mouth every 6 (six) hours as needed for mild pain   albuterol (PROVENTIL HFA,VENTOLIN HFA) 90 mcg/act inhaler   No No   Sig: Inhale 2 puffs every 4 (four) hours as needed for wheezing   cloNIDine (CATAPRES) 0 1 mg tablet   Yes No   Sig: Take 0 1 mg by mouth  diphenhydrAMINE (BENADRYL) 25 mg tablet   No No   Sig: Take 1 tablet (25 mg total) by mouth every 6 (six) hours   divalproex sodium (DEPAKOTE ER) 250 mg 24 hr tablet   Yes No   Sig: Take 250 mg by mouth     fluticasone (FLONASE) 50 mcg/act nasal spray   No No   Si spray into each nostril daily   ibuprofen (MOTRIN) 400 mg tablet   No No   Sig: Take 1 tablet (400 mg total) by mouth every 6 (six) hours as needed for moderate pain   ondansetron (ZOFRAN-ODT) 4 mg disintegrating tablet   No No   Sig: Take 1 tablet (4 mg total) by mouth every 8 (eight) hours as needed for nausea   pantoprazole (PROTONIX) 40 mg tablet   No No   Sig: Take 1 tablet (40 mg total) by mouth daily  simethicone (MYLICON) 80 mg chewable tablet   No No   Sig: Chew 1 tablet (80 mg total) every 6 (six) hours as needed for flatulence   sucralfate (CARAFATE) 1 g/10 mL suspension   No No   Sig: Take 10 mL (1 g total) by mouth 2 (two) times a day as needed (Abdominal pain)      Facility-Administered Medications: None       Past Medical History:   Diagnosis Date    Asthma     Autism spectrum disorder     Bipolar 1 disorder ([facility])     Cognitive impairment     Depression     Intellectual functioning disability     Intestinal disaccharidase deficiency     Mood swings     Psychiatric illness     [facility]     Self-injurious behavior        History reviewed  No pertinent surgical history  History reviewed  No pertinent family history  I have reviewed and agree with the history as documented  Social History     Tobacco Use    Smoking status: Current Every Day Smoker     Packs/day: 0 20     Types: Cigarettes    Smokeless tobacco: Never Used   Substance Use Topics    Alcohol use: No    Drug use: No       Review of Systems   Constitutional: Negative  HENT: Negative  Eyes: Negative  Respiratory: Negative  Cardiovascular: Negative  Gastrointestinal: Negative  Endocrine: Negative  Genitourinary: Negative  Musculoskeletal: Negative  Skin: Negative  Allergic/Immunologic: Negative  Neurological: Negative  Hematological: Negative  Psychiatric/Behavioral: Positive for behavioral problems  All other systems reviewed and are negative  Physical Exam  Physical Exam   Constitutional: He is oriented to person, place, and time  He appears well-developed and well-nourished  HENT:   Head: Normocephalic  Nose: Nose normal    Mouth/Throat: Oropharynx is clear and moist    Eyes: Pupils are equal, round, and reactive to light   Conjunctivae are normal    Neck: Normal range of motion  Neck supple  Cardiovascular: Normal rate, regular rhythm, normal heart sounds and intact distal pulses  Pulmonary/Chest: Effort normal and breath sounds normal    Abdominal: Soft  Bowel sounds are normal    Musculoskeletal: Normal range of motion  Neurological: He is alert and oriented to person, place, and time  Skin: Skin is warm and dry  Psychiatric: His affect is blunt  His speech is delayed  He is not actively hallucinating  He expresses impulsivity and inappropriate judgment  He expresses no homicidal and no suicidal ideation  He is inattentive  Nursing note and vitals reviewed  Vital Signs  ED Triage Vitals [02/17/20 1749]   Temperature Pulse Respirations Blood Pressure SpO2   (!) 96 9 °F (36 1 °C) 105 20 150/85 98 %      Temp Source Heart Rate Source Patient Position - Orthostatic VS BP Location FiO2 (%)   Tympanic Monitor Sitting Left arm --      Pain Score       No Pain           Vitals:    02/17/20 1749   BP: 150/85   Pulse: 105   Patient Position - Orthostatic VS: Sitting         Visual Acuity      ED Medications  Medications - No data to display    Diagnostic Studies  Results Reviewed     Procedure Component Value Units Date/Time    POCT alcohol breath test [[id number]]     Lab Status:  No result     Rapid drug screen, urine [[id number]]     Lab Status:  No result Specimen:  Urine                  No orders to display              Procedures  Procedures         ED Course                               MDM      Disposition  Final diagnoses:   None     ED Disposition     None      Follow-up Information    None         Patient's Medications   Discharge Prescriptions    No medications on file     No discharge procedures on file      PDMP Review     None          ED Provider  Electronically Signed by           Bdu Hodgson MD  02/25/20 2699

## 2020-02-17 NOTE — ED NOTES
Patient states the paper scrubs are causing itching  Provider (Dr Gilbert Savage )informed and patient given benadryl for itching  Patient given meal tray        Linwood Feliciaside  02/17/20 1817

## 2020-02-17 NOTE — ED NOTES
Pt provided more water and reminded to provide urine specimen       Merline Peters RN  02/17/20 0581

## 2020-02-18 NOTE — ED CARE HANDOFF
ED Course / Workup Pending (followup): Patient evaluated by myself at this point time in the crisis worker patient deemed stable for outpatient management currently lives in a group home  Plan will be for outpatient management follow-up given strict instructions when to return back to the emergency  Pt re-examined and evaluated after testing and treatment  Spoke with the patient and feeling improved and sxs have resolved  Will discharge home with close f/u with pcp and instructed to return to the ED if sxs worsen or continue  Pt agrees with the plan for discharge and feels comfortable to go home with proper f/u  Advised to return for worsening or additional problems  Diagnostic tests were reviewed and questions answered  Diagnosis, care plan and treatment options were discussed  The patient understand instructions and will follow up as directed  Counseling: I had a detailed discussion with the patient and/or guardian regarding: the historical points, exam findings, and any diagnostic results supporting the discharge diagnosis, lab results, radiology results, discharge instructions reviewed with patient and/or family/caregiver and understanding was verbalized  Instructions given to return to the emergency department if symptoms worsen or persist, or if there are any questions or concerns that arise at home       All labs reviewed and utilized in the medical decision making process    All radiology studies independently viewed by me and interpreted by the radiologist                                  Procedures  MDM    Disposition  Final diagnoses:   Encounter for psychological evaluation   Behavior disturbance     Time reflects when diagnosis was documented in both MDM as applicable and the Disposition within this note     Time User Action Codes Description Comment    2/17/2020  7:57 PM Femi Sheehan Add [Z00 8] Encounter for psychological evaluation     2/17/2020  7:57 PM Tonja Paget, Sheria Bucks Add [F91 9] Behavior disturbance       ED Disposition     ED Disposition Condition Date/Time Comment    Discharge Stable Mon Feb 17, 2020  7:57 PM Enzo Mckeon discharge to home/self care  Follow-up Information    None       Patient's Medications   Discharge Prescriptions    No medications on file     No discharge procedures on file         ED Provider  Electronically Signed by     Daniele Jackson DO  02/17/20 4580

## 2020-02-18 NOTE — ED NOTES
The pt was brought to the ED by police after he was found walking outside, waving a knife around  He reports that he was walking out for his daily walk, holding an extended pocket knife, when someone noticed him and called the police  The pt denies wanting to harm anyone else or himself, but states that he was 'just' waving the knife around in front of him  The pt lives w/ his brother and has workers staffed in the home 24/7  There are 2 workers who are in his home from approximately 8am until 11pm   From 11pm until the following morning, there is 1 staff person with him  The pt is permitted to take 2 walks a day by himself, one walk in the morning and one later in the day  He denies suicidal and homicidal ideations  No psychosis is present  He states that he recently saw his psychiatrist, Dr Mel Ballard, and some medications were changed  He reports 'feeling good' since he saw Dr Mel Ballard last week

## 2020-03-04 ENCOUNTER — HOSPITAL ENCOUNTER (EMERGENCY)
Facility: HOSPITAL | Age: 30
Discharge: HOME | End: 2020-03-04
Attending: EMERGENCY MEDICINE | Admitting: EMERGENCY MEDICINE
Payer: COMMERCIAL

## 2020-03-04 VITALS
OXYGEN SATURATION: 99 % | TEMPERATURE: 98.1 F | RESPIRATION RATE: 18 BRPM | SYSTOLIC BLOOD PRESSURE: 158 MMHG | DIASTOLIC BLOOD PRESSURE: 93 MMHG | HEART RATE: 102 BPM

## 2020-03-04 DIAGNOSIS — R45.1 AGITATION: Primary | ICD-10-CM

## 2020-03-04 PROCEDURE — 99284 EMERGENCY DEPT VISIT MOD MDM: CPT

## 2020-03-04 PROCEDURE — 99282 EMERGENCY DEPT VISIT SF MDM: CPT | Performed by: EMERGENCY MEDICINE
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
## 2020-05-24 ENCOUNTER — HOSPITAL ENCOUNTER (EMERGENCY)
Facility: HOSPITAL | Age: 30
Discharge: HOME | End: 2020-05-24
Attending: EMERGENCY MEDICINE | Admitting: EMERGENCY MEDICINE
Payer: COMMERCIAL

## 2020-05-24 ENCOUNTER — APPOINTMENT (EMERGENCY)
Dept: CT IMAGING | Facility: HOSPITAL | Age: 30
End: 2020-05-24
Payer: COMMERCIAL

## 2020-05-24 VITALS
TEMPERATURE: 96.1 F | WEIGHT: 147 LBS | DIASTOLIC BLOOD PRESSURE: 82 MMHG | HEART RATE: 96 BPM | OXYGEN SATURATION: 100 % | BODY MASS INDEX: 26.89 KG/M2 | SYSTOLIC BLOOD PRESSURE: 139 MMHG | RESPIRATION RATE: 14 BRPM

## 2020-05-24 DIAGNOSIS — E86.0 DEHYDRATION: ICD-10-CM

## 2020-05-24 DIAGNOSIS — R53.83 FATIGUE: Primary | ICD-10-CM

## 2020-05-24 LAB
ALBUMIN SERPL BCP-MCNC: 4.6 G/DL (ref 3–5.2)
ALP SERPL-CCNC: 103 U/L (ref 43–122)
ALT SERPL W P-5'-P-CCNC: 20 U/L (ref 9–52)
AMPHETAMINES SERPL QL SCN: NEGATIVE
ANION GAP SERPL CALCULATED.3IONS-SCNC: 8 MMOL/L (ref 5–14)
AST SERPL W P-5'-P-CCNC: 38 U/L (ref 17–59)
BARBITURATES UR QL: NEGATIVE
BASOPHILS # BLD AUTO: 0.1 THOUSANDS/ΜL (ref 0–0.1)
BASOPHILS NFR BLD AUTO: 1 % (ref 0–1)
BENZODIAZ UR QL: NEGATIVE
BILIRUB SERPL-MCNC: 0.7 MG/DL
BUN SERPL-MCNC: 21 MG/DL (ref 5–25)
CALCIUM SERPL-MCNC: 9.9 MG/DL (ref 8.4–10.2)
CHLORIDE SERPL-SCNC: 101 MMOL/L (ref 97–108)
CK MB SERPL-MCNC: 1.3 % (ref 0–2.5)
CK MB SERPL-MCNC: 8.7 NG/ML (ref 0–2.4)
CK SERPL-CCNC: 696 U/L (ref 55–170)
CO2 SERPL-SCNC: 29 MMOL/L (ref 22–30)
COCAINE UR QL: NEGATIVE
CREAT SERPL-MCNC: 0.89 MG/DL (ref 0.7–1.5)
EOSINOPHIL # BLD AUTO: 0 THOUSAND/ΜL (ref 0–0.4)
EOSINOPHIL NFR BLD AUTO: 0 % (ref 0–6)
ERYTHROCYTE [DISTWIDTH] IN BLOOD BY AUTOMATED COUNT: 12.4 %
ETHANOL SERPL-MCNC: <10 MG/DL (ref 0–10)
GFR SERPL CREATININE-BSD FRML MDRD: 132 ML/MIN/1.73SQ M
GLUCOSE SERPL-MCNC: 74 MG/DL (ref 70–99)
HCT VFR BLD AUTO: 46.5 % (ref 41–53)
HGB BLD-MCNC: 16.4 G/DL (ref 13.5–17.5)
LIPASE SERPL-CCNC: 72 U/L (ref 23–300)
LYMPHOCYTES # BLD AUTO: 1.9 THOUSANDS/ΜL (ref 0.5–4)
LYMPHOCYTES NFR BLD AUTO: 20 % (ref 25–45)
MCH RBC QN AUTO: 32.9 PG (ref 26–34)
MCHC RBC AUTO-ENTMCNC: 35.3 G/DL (ref 31–36)
MCV RBC AUTO: 93 FL (ref 80–100)
METHADONE UR QL: NEGATIVE
MONOCYTES # BLD AUTO: 1.1 THOUSAND/ΜL (ref 0.2–0.9)
MONOCYTES NFR BLD AUTO: 12 % (ref 1–10)
NEUTROPHILS # BLD AUTO: 6.6 THOUSANDS/ΜL (ref 1.8–7.8)
NEUTS SEG NFR BLD AUTO: 68 % (ref 45–65)
OPIATES UR QL SCN: NEGATIVE
PCP UR QL: NEGATIVE
PLATELET # BLD AUTO: 281 THOUSANDS/UL (ref 150–450)
PMV BLD AUTO: 8.3 FL (ref 8.9–12.7)
POTASSIUM SERPL-SCNC: 4 MMOL/L (ref 3.6–5)
PROT SERPL-MCNC: 8.2 G/DL (ref 5.9–8.4)
RBC # BLD AUTO: 4.99 MILLION/UL (ref 4.5–5.9)
SODIUM SERPL-SCNC: 138 MMOL/L (ref 137–147)
THC UR QL: NEGATIVE
TROPONIN I SERPL-MCNC: <0.01 NG/ML (ref 0–0.03)
WBC # BLD AUTO: 9.8 THOUSAND/UL (ref 4.5–11)

## 2020-05-24 PROCEDURE — 82553 CREATINE MB FRACTION: CPT | Performed by: PHYSICIAN ASSISTANT

## 2020-05-24 PROCEDURE — 70450 CT HEAD/BRAIN W/O DYE: CPT

## 2020-05-24 PROCEDURE — 80053 COMPREHEN METABOLIC PANEL: CPT | Performed by: PHYSICIAN ASSISTANT

## 2020-05-24 PROCEDURE — 36415 COLL VENOUS BLD VENIPUNCTURE: CPT | Performed by: PHYSICIAN ASSISTANT

## 2020-05-24 PROCEDURE — 80307 DRUG TEST PRSMV CHEM ANLYZR: CPT | Performed by: PHYSICIAN ASSISTANT

## 2020-05-24 PROCEDURE — 93005 ELECTROCARDIOGRAM TRACING: CPT

## 2020-05-24 PROCEDURE — 84484 ASSAY OF TROPONIN QUANT: CPT | Performed by: PHYSICIAN ASSISTANT

## 2020-05-24 PROCEDURE — 99284 EMERGENCY DEPT VISIT MOD MDM: CPT

## 2020-05-24 PROCEDURE — 99284 EMERGENCY DEPT VISIT MOD MDM: CPT | Performed by: PHYSICIAN ASSISTANT

## 2020-05-24 PROCEDURE — 96374 THER/PROPH/DIAG INJ IV PUSH: CPT

## 2020-05-24 PROCEDURE — 83690 ASSAY OF LIPASE: CPT | Performed by: PHYSICIAN ASSISTANT

## 2020-05-24 PROCEDURE — 80320 DRUG SCREEN QUANTALCOHOLS: CPT | Performed by: PHYSICIAN ASSISTANT

## 2020-05-24 PROCEDURE — 96361 HYDRATE IV INFUSION ADD-ON: CPT

## 2020-05-24 PROCEDURE — 85025 COMPLETE CBC W/AUTO DIFF WBC: CPT | Performed by: PHYSICIAN ASSISTANT

## 2020-05-24 PROCEDURE — 82550 ASSAY OF CK (CPK): CPT | Performed by: PHYSICIAN ASSISTANT

## 2020-05-24 RX ORDER — ACETAMINOPHEN 325 MG/1
650 TABLET ORAL ONCE
Status: COMPLETED | OUTPATIENT
Start: 2020-05-24 | End: 2020-05-24

## 2020-05-24 RX ORDER — KETOROLAC TROMETHAMINE 30 MG/ML
30 INJECTION, SOLUTION INTRAMUSCULAR; INTRAVENOUS ONCE
Status: COMPLETED | OUTPATIENT
Start: 2020-05-24 | End: 2020-05-24

## 2020-05-24 RX ORDER — SODIUM CHLORIDE 9 MG/ML
250 INJECTION, SOLUTION INTRAVENOUS CONTINUOUS
Status: DISCONTINUED | OUTPATIENT
Start: 2020-05-24 | End: 2020-05-24 | Stop reason: HOSPADM

## 2020-05-24 RX ADMIN — SODIUM CHLORIDE 1000 ML: 0.9 INJECTION, SOLUTION INTRAVENOUS at 15:20

## 2020-05-24 RX ADMIN — ACETAMINOPHEN 650 MG: 325 TABLET ORAL at 16:43

## 2020-05-24 RX ADMIN — KETOROLAC TROMETHAMINE 30 MG: 30 INJECTION, SOLUTION INTRAMUSCULAR at 16:44

## 2020-05-25 LAB
ATRIAL RATE: 74 BPM
P AXIS: 51 DEGREES
PR INTERVAL: 148 MS
QRS AXIS: 72 DEGREES
QRSD INTERVAL: 72 MS
QT INTERVAL: 350 MS
QTC INTERVAL: 388 MS
T WAVE AXIS: 13 DEGREES
VENTRICULAR RATE: 74 BPM

## 2020-05-25 PROCEDURE — 93010 ELECTROCARDIOGRAM REPORT: CPT | Performed by: INTERNAL MEDICINE

## 2020-06-20 ENCOUNTER — HOSPITAL ENCOUNTER (EMERGENCY)
Facility: HOSPITAL | Age: 30
Discharge: HOME | End: 2020-06-20
Attending: EMERGENCY MEDICINE | Admitting: EMERGENCY MEDICINE
Payer: COMMERCIAL

## 2020-06-20 VITALS
OXYGEN SATURATION: 100 % | TEMPERATURE: 98.2 F | HEART RATE: 96 BPM | DIASTOLIC BLOOD PRESSURE: 69 MMHG | RESPIRATION RATE: 18 BRPM | SYSTOLIC BLOOD PRESSURE: 158 MMHG

## 2020-06-20 DIAGNOSIS — R45.1 AGITATION: Primary | ICD-10-CM

## 2020-06-20 PROCEDURE — 99284 EMERGENCY DEPT VISIT MOD MDM: CPT | Performed by: PHYSICIAN ASSISTANT

## 2020-06-20 PROCEDURE — 99285 EMERGENCY DEPT VISIT HI MDM: CPT

## 2020-07-01 ENCOUNTER — HOSPITAL ENCOUNTER (EMERGENCY)
Facility: HOSPITAL | Age: 30
Discharge: HOME | End: 2020-07-01
Attending: EMERGENCY MEDICINE | Admitting: EMERGENCY MEDICINE
Payer: COMMERCIAL

## 2020-07-01 VITALS
SYSTOLIC BLOOD PRESSURE: 128 MMHG | DIASTOLIC BLOOD PRESSURE: 63 MMHG | HEART RATE: 84 BPM | RESPIRATION RATE: 17 BRPM | TEMPERATURE: 98.4 F | OXYGEN SATURATION: 98 %

## 2020-07-01 DIAGNOSIS — T14.8XXA AVULSION OF SKIN: Primary | ICD-10-CM

## 2020-07-01 PROCEDURE — 99282 EMERGENCY DEPT VISIT SF MDM: CPT | Performed by: EMERGENCY MEDICINE

## 2020-07-01 PROCEDURE — 99283 EMERGENCY DEPT VISIT LOW MDM: CPT

## 2020-07-01 RX ORDER — GINSENG 100 MG
1 CAPSULE ORAL ONCE
Status: COMPLETED | OUTPATIENT
Start: 2020-07-01 | End: 2020-07-01

## 2020-07-01 RX ADMIN — BACITRACIN 1 SMALL APPLICATION: 500 OINTMENT TOPICAL at 10:47

## 2020-07-01 NOTE — ED PROVIDER NOTES
Pt Name: Sukumar Mahoney  MRN: [id number]  [place] 1990  Age/Sex: 31 y o  male  Date of evaluation: 7/1/2020  PCP: BRANDEE Hoover    CHIEF COMPLAINT    Chief Complaint   Patient presents with    Medical Problem     pt presents from group home, per staff policy because pt has been away from group home for 2 days he must be medically evaluated  pt offers no complaints  denies fever or pain  HPI    Isaura Lopez presents to the Emergency Department complaining of needed to be checked  He left his group home in [place] two days ago  He tells me that he took a bus to [facility] to stay at the home of a foster caretaker he has had  He tells me that he feels well  He was fed and drank fluids  He tells me that he did not shower and the place he stayed was dirty and there were several pets but no indication of bed bugs, flea bites, rashes or otherwise  He did scrape his great toe on concrete and has a small wound there  HPI      Past Medical and Surgical History    Past Medical History:   Diagnosis Date    Asthma     Autism spectrum disorder     Bipolar 1 disorder ([facility])     Cognitive impairment     Depression     Intellectual functioning disability     Intestinal disaccharidase deficiency     Mood swings     Psychiatric illness     Schizo-affective schizophrenia ([facility] [address] )     Self-injurious behavior        Past Surgical History:   Procedure Laterality Date    FRACTURE SURGERY      SHOULDER SURGERY         History reviewed  No pertinent family history      Social History     Tobacco Use    Smoking status: Current Every Day Smoker     Packs/day: 0 20     Types: Cigarettes    Smokeless tobacco: Never Used   Substance Use Topics    Alcohol use: No    Drug use: No              Allergies    Allergies   Allergen Reactions    Shellfish-Derived Products     Lactase Other (See Comments)     Diarrhea       Home Medications    Prior to Admission medications    Medication Sig Start Date End Date Taking? Authorizing Provider   acetaminophen (TYLENOL) 500 mg tablet Take 1 tablet (500 mg total) by mouth every 6 (six) hours as needed for mild pain 11/7/19   Larry Ca PA-C   albuterol (PROVENTIL HFA,VENTOLIN HFA) 90 mcg/act inhaler Inhale 2 puffs every 4 (four) hours as needed for wheezing 11/7/19   Larry Ca PA-C   cloNIDine (CATAPRES) 0 1 mg tablet Take 0 1 mg by mouth  Historical Provider, MD   diphenhydrAMINE (BENADRYL) 25 mg tablet Take 1 tablet (25 mg total) by mouth every 6 (six) hours 12/9/19   Larry Ca PA-C   divalproex sodium (DEPAKOTE ER) 250 mg 24 hr tablet Take 250 mg by mouth  7/25/16   Historical Provider, MD   fluticasone (FLONASE) 50 mcg/act nasal spray 1 spray into each nostril daily 1/4/19   Larry Ca PA-C   ibuprofen (MOTRIN) 400 mg tablet Take 1 tablet (400 mg total) by mouth every 6 (six) hours as needed for moderate pain 11/7/19   Larry Ca PA-C   ondansetron (ZOFRAN-ODT) 4 mg disintegrating tablet Take 1 tablet (4 mg total) by mouth every 8 (eight) hours as needed for nausea 1/21/19   Sofia Latham MD   pantoprazole (PROTONIX) 40 mg tablet Take 1 tablet (40 mg total) by mouth daily  9/4/16   Ibeth Coker PA-C   simethicone (MYLICON) 80 mg chewable tablet Chew 1 tablet (80 mg total) every 6 (six) hours as needed for flatulence 11/22/19   Becca Casper PA-C   sucralfate (CARAFATE) 1 g/10 mL suspension Take 10 mL (1 g total) by mouth 2 (two) times a day as needed (Abdominal pain) 1/21/19   Sofia Latham MD           Review of Systems    Review of Systems   Constitutional: Negative for activity change, appetite change, chills, fatigue and fever  HENT: Negative for congestion, rhinorrhea, sinus pressure, sneezing, sore throat and trouble swallowing  Eyes: Negative for photophobia and visual disturbance  Respiratory: Negative for chest tightness, shortness of breath and wheezing  Cardiovascular: Negative for chest pain and leg swelling  Gastrointestinal: Negative for abdominal distention, abdominal pain, constipation, diarrhea, nausea and vomiting  Endocrine: Negative for polydipsia, polyphagia and polyuria  Genitourinary: Negative for decreased urine volume, difficulty urinating, dysuria, flank pain, frequency and urgency  Musculoskeletal: Negative for back pain, gait problem, joint swelling and neck pain  Skin: Negative for color change, pallor and rash  Allergic/Immunologic: Negative for immunocompromised state  Neurological: Negative for seizures, syncope, speech difficulty, weakness, light-headedness and headaches  Psychiatric/Behavioral: Negative for confusion  All other systems reviewed and are negative  Physical Exam      ED Triage Vitals [07/01/20 1025]   Temperature Pulse Respirations Blood Pressure SpO2   98 4 °F (36 9 °C) 84 17 128/63 98 %      Temp Source Heart Rate Source Patient Position - Orthostatic VS BP Location FiO2 (%)   Oral Monitor Sitting Right arm --      Pain Score       --               Physical Exam   Constitutional: He is oriented to person, place, and time  He appears well-developed and well-nourished  No distress  HENT:   Head: Normocephalic and atraumatic  Nose: Nose normal    Mouth/Throat: Oropharynx is clear and moist    Eyes: Pupils are equal, round, and reactive to light  Conjunctivae and EOM are normal    Neck: Normal range of motion  Neck supple  Cardiovascular: Normal rate, regular rhythm and normal heart sounds  Exam reveals no gallop and no friction rub  No murmur heard  Pulmonary/Chest: Effort normal and breath sounds normal  No respiratory distress  He has no wheezes  He has no rales  Abdominal: Soft  Bowel sounds are normal  There is no tenderness  There is no rebound and no guarding  Musculoskeletal: Normal range of motion  Neurological: He is alert and oriented to person, place, and time     Skin: Skin is warm and dry  He is not diaphoretic  Psychiatric: He has a normal mood and affect  His behavior is normal    Nursing note and vitals reviewed  Assessment and Plan    Chrystine Spatz is a 31 y o  male who presents with abrasion to distal great toe  Physical examination remarkable for same  Local wound care  No other evaluation or treatment is indicated in this case  MDM    Diagnostic Results      Labs:        Radiology:    No orders to display       All radiology studies independently viewed by me and interpreted by the radiologist     Procedure    Procedures      ED Course of Care and Re-Assessments        Medications   bacitracin topical ointment 1 small application (1 small application Topical Given 7/1/20 1047)           FINAL IMPRESSION    Final diagnoses:   Avulsion of skin         DISPOSITION/PLAN    Time reflects when diagnosis was documented in both MDM as applicable and the Disposition within this note     Time User Action Codes Description Comment    7/1/2020 10:41 AM Last Flores Add Taina Myers  8XXA] Avulsion of skin       ED Disposition     ED Disposition Condition Date/Time Comment    Discharge Stable Wed Jul 1, 2020 10:41 AM Chrystine Spatz discharge to home/self care              Follow-up Information     Follow up With Specialties Details Why Contact Info    BRANDEE Diaz Nurse Practitioner  As needed, For wound re-check [address] [address]               PATIENT REFERRED TO:    Maria Isabel Bernard, [address]  [place] [address]  [phone]      As needed, For wound re-check      DISCHARGE MEDICATIONS:    Discharge Medication List as of 7/1/2020 10:43 AM      CONTINUE these medications which have NOT CHANGED    Details   acetaminophen (TYLENOL) 500 mg tablet Take 1 tablet (500 mg total) by mouth every 6 (six) hours as needed for mild pain, Starting Thu 11/7/2019, Print      albuterol (PROVENTIL HFA,VENTOLIN HFA) 90 mcg/act inhaler Inhale 2 puffs every 4 (four) hours as needed for wheezing, Starting Thu 11/7/2019, Print      cloNIDine (CATAPRES) 0 1 mg tablet Take 0 1 mg by mouth , Until Discontinued, Historical Med      diphenhydrAMINE (BENADRYL) 25 mg tablet Take 1 tablet (25 mg total) by mouth every 6 (six) hours, Starting Mon 12/9/2019, Print      divalproex sodium (DEPAKOTE ER) 250 mg 24 hr tablet Take 250 mg by mouth , Starting 7/25/2016, Until Discontinued, Historical Med      fluticasone (FLONASE) 50 mcg/act nasal spray 1 spray into each nostril daily, Starting Fri 1/4/2019, Print      ibuprofen (MOTRIN) 400 mg tablet Take 1 tablet (400 mg total) by mouth every 6 (six) hours as needed for moderate pain, Starting Thu 11/7/2019, Print      ondansetron (ZOFRAN-ODT) 4 mg disintegrating tablet Take 1 tablet (4 mg total) by mouth every 8 (eight) hours as needed for nausea, Starting Mon 1/21/2019, Print      pantoprazole (PROTONIX) 40 mg tablet Take 1 tablet (40 mg total) by mouth daily  , Starting 9/4/2016, Until Discontinued, Print      simethicone (MYLICON) 80 mg chewable tablet Chew 1 tablet (80 mg total) every 6 (six) hours as needed for flatulence, Starting Fri 11/22/2019, Print      sucralfate (CARAFATE) 1 g/10 mL suspension Take 10 mL (1 g total) by mouth 2 (two) times a day as needed (Abdominal pain), Starting Mon 1/21/2019, Print             No discharge procedures on file           Nic Lowry, [address] [place] [address], DO  07/01/20 8055

## 2020-07-13 ENCOUNTER — HOSPITAL ENCOUNTER (EMERGENCY)
Facility: HOSPITAL | Age: 30
Discharge: HOME | End: 2020-07-13
Attending: EMERGENCY MEDICINE | Admitting: EMERGENCY MEDICINE
Payer: COMMERCIAL

## 2020-07-13 ENCOUNTER — APPOINTMENT (EMERGENCY)
Dept: CT IMAGING | Facility: HOSPITAL | Age: 30
End: 2020-07-13
Payer: COMMERCIAL

## 2020-07-13 VITALS
OXYGEN SATURATION: 98 % | TEMPERATURE: 98.6 F | SYSTOLIC BLOOD PRESSURE: 164 MMHG | BODY MASS INDEX: 26.52 KG/M2 | RESPIRATION RATE: 16 BRPM | DIASTOLIC BLOOD PRESSURE: 62 MMHG | WEIGHT: 145 LBS | HEART RATE: 101 BPM

## 2020-07-13 DIAGNOSIS — R10.9 ABDOMINAL PAIN: Primary | ICD-10-CM

## 2020-07-13 LAB
EOSINOPHIL # BLD AUTO: 0.08 THOUSAND/UL (ref 0–0.4)
EOSINOPHIL NFR BLD MANUAL: 1 % (ref 0–6)
ERYTHROCYTE [DISTWIDTH] IN BLOOD BY AUTOMATED COUNT: 13.4 %
HCT VFR BLD AUTO: 45.2 % (ref 41–53)
HGB BLD-MCNC: 15.7 G/DL (ref 13.5–17.5)
LYMPHOCYTES # BLD AUTO: 2.94 THOUSAND/UL (ref 0.5–4)
LYMPHOCYTES # BLD AUTO: 35 % (ref 25–45)
MCH RBC QN AUTO: 32.1 PG (ref 26–34)
MCHC RBC AUTO-ENTMCNC: 34.8 G/DL (ref 31–36)
MCV RBC AUTO: 92 FL (ref 80–100)
MONOCYTES # BLD AUTO: 1.51 THOUSAND/UL (ref 0.2–0.9)
MONOCYTES NFR BLD AUTO: 18 % (ref 1–10)
NEUTS BAND NFR BLD MANUAL: 1 % (ref 0–8)
NEUTS SEG # BLD: 3.78 THOUSAND/UL (ref 1.8–7.8)
NEUTS SEG NFR BLD AUTO: 44 %
PLATELET # BLD AUTO: 261 THOUSANDS/UL (ref 150–450)
PLATELET BLD QL SMEAR: ADEQUATE
PMV BLD AUTO: 8.2 FL (ref 8.9–12.7)
RBC # BLD AUTO: 4.9 MILLION/UL (ref 4.5–5.9)
RBC MORPH BLD: NORMAL
TOTAL CELLS COUNTED SPEC: 100
VARIANT LYMPHS # BLD AUTO: 1 % (ref 0–0)
WBC # BLD AUTO: 8.4 THOUSAND/UL (ref 4.5–11)

## 2020-07-13 PROCEDURE — 99284 EMERGENCY DEPT VISIT MOD MDM: CPT

## 2020-07-13 PROCEDURE — 36415 COLL VENOUS BLD VENIPUNCTURE: CPT | Performed by: EMERGENCY MEDICINE

## 2020-07-13 PROCEDURE — 74176 CT ABD & PELVIS W/O CONTRAST: CPT

## 2020-07-13 PROCEDURE — 99282 EMERGENCY DEPT VISIT SF MDM: CPT | Performed by: EMERGENCY MEDICINE

## 2020-07-13 PROCEDURE — 85007 BL SMEAR W/DIFF WBC COUNT: CPT | Performed by: EMERGENCY MEDICINE

## 2020-07-13 PROCEDURE — 85027 COMPLETE CBC AUTOMATED: CPT | Performed by: EMERGENCY MEDICINE

## 2020-07-13 NOTE — ED NOTES
This nurse inserted a 20g IV to patient's left AC patient moved his arm and the IV had to be removed  Patient then refused to let this nurse or another nurse try to reinsert another IV        Nasreen Jones RN  07/13/20 9773
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
## 2020-07-27 ENCOUNTER — HOSPITAL ENCOUNTER (EMERGENCY)
Facility: HOSPITAL | Age: 30
Discharge: HOME | End: 2020-07-27
Attending: EMERGENCY MEDICINE | Admitting: EMERGENCY MEDICINE
Payer: COMMERCIAL

## 2020-07-27 VITALS
DIASTOLIC BLOOD PRESSURE: 110 MMHG | RESPIRATION RATE: 16 BRPM | SYSTOLIC BLOOD PRESSURE: 160 MMHG | WEIGHT: 141.9 LBS | OXYGEN SATURATION: 96 % | HEART RATE: 88 BPM | TEMPERATURE: 98.3 F | BODY MASS INDEX: 25.95 KG/M2

## 2020-07-27 DIAGNOSIS — F32.A DEPRESSION: Primary | ICD-10-CM

## 2020-07-27 PROCEDURE — 99283 EMERGENCY DEPT VISIT LOW MDM: CPT

## 2020-07-27 PROCEDURE — 99282 EMERGENCY DEPT VISIT SF MDM: CPT | Performed by: EMERGENCY MEDICINE

## 2020-07-27 NOTE — ED PROVIDER NOTES
History  Chief Complaint   Patient presents with    Depression     feeling "down on myself" has multiple family stressors denies SI or HI but would like resources     58-year-old gentleman presents for psychiatric evaluation  He states that he is feeling depressed denies any thoughts of self-harm or suicide  He states he is taking all his medications but he is having issues relating to his father and would like to talk to someone about this  He denies any acute medical concerns or other acute issues and states he simply wants resources for outpatient follow-up  Psychiatric Evaluation   Presenting symptoms: depression    Degree of incapacity (severity): Moderate  Onset quality:  Gradual  Timing:  Intermittent  Progression:  Waxing and waning  Chronicity:  Recurrent  Treatment compliance: All of the time  Relieved by:  Nothing  Worsened by:  Family interactions  Ineffective treatments:  None tried  Associated symptoms: no abdominal pain, no anhedonia, no anxiety, no appetite change, no chest pain, no decreased need for sleep, not distractible, no euphoric mood, no fatigue, no feelings of worthlessness, no headaches, no hypersomnia, no hyperventilation, no insomnia, no irritability and no poor judgment        Prior to Admission Medications   Prescriptions Last Dose Informant Patient Reported? Taking?   acetaminophen (TYLENOL) 500 mg tablet   No No   Sig: Take 1 tablet (500 mg total) by mouth every 6 (six) hours as needed for mild pain   albuterol (PROVENTIL HFA,VENTOLIN HFA) 90 mcg/act inhaler   No No   Sig: Inhale 2 puffs every 4 (four) hours as needed for wheezing   cloNIDine (CATAPRES) 0 1 mg tablet   Yes No   Sig: Take 0 1 mg by mouth  diphenhydrAMINE (BENADRYL) 25 mg tablet   No No   Sig: Take 1 tablet (25 mg total) by mouth every 6 (six) hours   divalproex sodium (DEPAKOTE ER) 250 mg 24 hr tablet   Yes No   Sig: Take 250 mg by mouth     fluticasone (FLONASE) 50 mcg/act nasal spray   No No   Si spray into each nostril daily   ibuprofen (MOTRIN) 400 mg tablet   No No   Sig: Take 1 tablet (400 mg total) by mouth every 6 (six) hours as needed for moderate pain   ondansetron (ZOFRAN-ODT) 4 mg disintegrating tablet   No No   Sig: Take 1 tablet (4 mg total) by mouth every 8 (eight) hours as needed for nausea   pantoprazole (PROTONIX) 40 mg tablet   No No   Sig: Take 1 tablet (40 mg total) by mouth daily  simethicone (MYLICON) 80 mg chewable tablet   No No   Sig: Chew 1 tablet (80 mg total) every 6 (six) hours as needed for flatulence   sucralfate (CARAFATE) 1 g/10 mL suspension   No No   Sig: Take 10 mL (1 g total) by mouth 2 (two) times a day as needed (Abdominal pain)      Facility-Administered Medications: None       Past Medical History:   Diagnosis Date    Asthma     Autism spectrum disorder     Bipolar 1 disorder ([facility])     Cognitive impairment     Depression     Intellectual functioning disability     Intestinal disaccharidase deficiency     Mood swings     Psychiatric illness     [facility]ffective schizophrenia ([facility] [address] [facility]     Self-injurious behavior        Past Surgical History:   Procedure Laterality Date    FRACTURE SURGERY      SHOULDER SURGERY         History reviewed  No pertinent family history  I have reviewed and agree with the history as documented  E-Cigarette/Vaping    E-Cigarette Use Never User      E-Cigarette/Vaping Substances     Social History     Tobacco Use    Smoking status: Former Smoker     Packs/day: 0 20     Types: Cigarettes    Smokeless tobacco: Never Used   Substance Use Topics    Alcohol use: No    Drug use: No       Review of Systems   Constitutional: Negative for activity change, appetite change, chills, fatigue, fever and irritability  HENT: Negative  Negative for congestion, postnasal drip, rhinorrhea, sinus pain, sore throat and trouble swallowing  Eyes: Negative  Respiratory: Negative  Cardiovascular: Negative for chest pain  Gastrointestinal: Negative for abdominal pain, constipation, diarrhea, nausea and vomiting  Endocrine: Negative  Genitourinary: Negative  Musculoskeletal: Negative  Negative for arthralgias, back pain and myalgias  Skin: Negative  Allergic/Immunologic: Negative  Neurological: Negative  Negative for headaches  Hematological: Negative  Psychiatric/Behavioral: Negative  The patient is not nervous/anxious and does not have insomnia  Physical Exam  Physical Exam   Constitutional: He is oriented to person, place, and time  He appears well-developed and well-nourished  No distress  HENT:   Head: Normocephalic and atraumatic  Mouth/Throat: Oropharynx is clear and moist    Eyes: Pupils are equal, round, and reactive to light  Neck: Neck supple  Cardiovascular: Normal rate, regular rhythm and normal heart sounds  Pulmonary/Chest: Effort normal and breath sounds normal  No respiratory distress  Abdominal: Soft  Bowel sounds are normal  He exhibits no distension  There is no tenderness  There is no guarding  Musculoskeletal: Normal range of motion  He exhibits no edema  Neurological: He is alert and oriented to person, place, and time  Skin: Skin is warm and dry  Capillary refill takes less than 2 seconds  He is not diaphoretic  Psychiatric: He has a normal mood and affect  His behavior is normal    Nursing note and vitals reviewed        Vital Signs  ED Triage Vitals [07/27/20 1131]   Temperature Pulse Respirations Blood Pressure SpO2   98 3 °F (36 8 °C) 88 16 (!) 160/110 96 %      Temp Source Heart Rate Source Patient Position - Orthostatic VS BP Location FiO2 (%)   Tympanic Monitor Sitting Left arm --      Pain Score       --           Vitals:    07/27/20 1131   BP: (!) 160/110   Pulse: 88   Patient Position - Orthostatic VS: Sitting         Visual Acuity      ED Medications  Medications - No data to display    Diagnostic Studies  Results Reviewed     None No orders to display              Procedures  Procedures         ED Course                                             MDM      Disposition  Final diagnoses:   Depression     Time reflects when diagnosis was documented in both MDM as applicable and the Disposition within this note     Time User Action Codes Description Comment    7/27/2020 11:47 AM Rebecca Vogel [F32 9] Depression       ED Disposition     ED Disposition Condition Date/Time Comment    Discharge Stable Mon Jul 27, 2020 11:47 AM Nona Wilkerson discharge to home/self care  Follow-up Information    None         Patient's Medications   Discharge Prescriptions    No medications on file     No discharge procedures on file      PDMP Review     None          ED Provider  Electronically Signed by           Rabia Garner DO  07/27/20 1149

## 2020-08-03 ENCOUNTER — HOSPITAL ENCOUNTER (EMERGENCY)
Facility: HOSPITAL | Age: 30
Discharge: HOME | End: 2020-08-03
Attending: EMERGENCY MEDICINE | Admitting: EMERGENCY MEDICINE
Payer: COMMERCIAL

## 2020-08-03 VITALS
BODY MASS INDEX: 25.97 KG/M2 | HEART RATE: 106 BPM | RESPIRATION RATE: 16 BRPM | WEIGHT: 142 LBS | SYSTOLIC BLOOD PRESSURE: 132 MMHG | OXYGEN SATURATION: 100 % | DIASTOLIC BLOOD PRESSURE: 88 MMHG | TEMPERATURE: 98.9 F

## 2020-08-03 DIAGNOSIS — Z59.00 HOMELESSNESS: ICD-10-CM

## 2020-08-03 DIAGNOSIS — F32.A DEPRESSION: ICD-10-CM

## 2020-08-03 DIAGNOSIS — Z00.8 ENCOUNTER FOR PSYCHOLOGICAL EVALUATION: Primary | ICD-10-CM

## 2020-08-03 LAB
AMPHETAMINES SERPL QL SCN: NEGATIVE
ANION GAP SERPL CALCULATED.3IONS-SCNC: 9 MMOL/L (ref 5–14)
BACTERIA UR QL AUTO: ABNORMAL /HPF
BARBITURATES UR QL: NEGATIVE
BENZODIAZ UR QL: NEGATIVE
BILIRUB UR QL STRIP: NEGATIVE
BUN SERPL-MCNC: 17 MG/DL (ref 5–25)
CALCIUM SERPL-MCNC: 9.6 MG/DL (ref 8.4–10.2)
CHLORIDE SERPL-SCNC: 101 MMOL/L (ref 97–108)
CLARITY UR: CLEAR
CO2 SERPL-SCNC: 29 MMOL/L (ref 22–30)
COCAINE UR QL: NEGATIVE
COLOR UR: ABNORMAL
CREAT SERPL-MCNC: 0.96 MG/DL (ref 0.7–1.5)
ETHANOL EXG-MCNC: 0 MG/DL
GFR SERPL CREATININE-BSD FRML MDRD: 121 ML/MIN/1.73SQ M
GLUCOSE SERPL-MCNC: 104 MG/DL (ref 65–140)
GLUCOSE SERPL-MCNC: 108 MG/DL (ref 70–99)
GLUCOSE UR STRIP-MCNC: NEGATIVE MG/DL
HGB UR QL STRIP.AUTO: NEGATIVE
HYALINE CASTS #/AREA URNS LPF: ABNORMAL /LPF
KETONES UR STRIP-MCNC: ABNORMAL MG/DL
LEUKOCYTE ESTERASE UR QL STRIP: NEGATIVE
METHADONE UR QL: NEGATIVE
MUCOUS THREADS UR QL AUTO: ABNORMAL
NITRITE UR QL STRIP: NEGATIVE
NON-SQ EPI CELLS URNS QL MICRO: ABNORMAL /HPF
OPIATES UR QL SCN: NEGATIVE
OXYCODONE+OXYMORPHONE UR QL SCN: NEGATIVE
PCP UR QL: NEGATIVE
PH UR STRIP.AUTO: 5 [PH]
POTASSIUM SERPL-SCNC: 4.1 MMOL/L (ref 3.6–5)
PROT UR STRIP-MCNC: ABNORMAL MG/DL
RBC #/AREA URNS AUTO: ABNORMAL /HPF
SARS-COV-2 RNA RESP QL NAA+PROBE: NEGATIVE
SODIUM SERPL-SCNC: 139 MMOL/L (ref 137–147)
SP GR UR STRIP.AUTO: 1.02 (ref 1–1.04)
THC UR QL: NEGATIVE
UROBILINOGEN UA: 1 MG/DL
VALPROATE SERPL-MCNC: 10.2 UG/ML (ref 50–120)
WBC #/AREA URNS AUTO: ABNORMAL /HPF

## 2020-08-03 PROCEDURE — 82948 REAGENT STRIP/BLOOD GLUCOSE: CPT

## 2020-08-03 PROCEDURE — 80048 BASIC METABOLIC PNL TOTAL CA: CPT | Performed by: EMERGENCY MEDICINE

## 2020-08-03 PROCEDURE — 99284 EMERGENCY DEPT VISIT MOD MDM: CPT

## 2020-08-03 PROCEDURE — 87635 SARS-COV-2 COVID-19 AMP PRB: CPT | Performed by: EMERGENCY MEDICINE

## 2020-08-03 PROCEDURE — 80307 DRUG TEST PRSMV CHEM ANLYZR: CPT | Performed by: EMERGENCY MEDICINE

## 2020-08-03 PROCEDURE — 80164 ASSAY DIPROPYLACETIC ACD TOT: CPT | Performed by: EMERGENCY MEDICINE

## 2020-08-03 PROCEDURE — 82075 ASSAY OF BREATH ETHANOL: CPT | Performed by: EMERGENCY MEDICINE

## 2020-08-03 PROCEDURE — 81001 URINALYSIS AUTO W/SCOPE: CPT | Performed by: EMERGENCY MEDICINE

## 2020-08-03 PROCEDURE — 99284 EMERGENCY DEPT VISIT MOD MDM: CPT | Performed by: EMERGENCY MEDICINE

## 2020-08-03 PROCEDURE — 36415 COLL VENOUS BLD VENIPUNCTURE: CPT | Performed by: EMERGENCY MEDICINE

## 2020-08-03 RX ORDER — LORAZEPAM 0.5 MG/1
1 TABLET ORAL ONCE
Status: COMPLETED | OUTPATIENT
Start: 2020-08-03 | End: 2020-08-03

## 2020-08-03 RX ADMIN — LORAZEPAM 1 MG: 0.5 TABLET ORAL at 17:21

## 2020-08-03 SDOH — ECONOMIC STABILITY - HOUSING INSECURITY: HOMELESSNESS UNSPECIFIED: Z59.00

## 2020-08-03 NOTE — ED NOTES
Patient is a 31 yr old male who had multiple services in the past, but becomes angry and uncooperative with them  He was sent to the ED by [facility] outreach staff due to wanting help with cocaine use  However, he does not have a documented hx of use, and is unable to tell staff when he used  He is not suicidal or homicidal   It is not clear if he is taking any medication  He is able to explain why he got mad at the services that he was receiving - he is an adult and he wants to make his own decisions  He came to the ED asking for food and a shower  Patient does have superficial cuts on his arm (right)  Most are very old  He described the cuts as something he does when he feels angry or frustrated  He does not endorse that he was doing it due to suicidal thoughts  Patient is followed by Dr Richard Alvarado at [facility]  He states that he was keeping up with his telephone conferences until he lost his phone

## 2020-08-03 NOTE — ED PROVIDER NOTES
History  Chief Complaint   Patient presents with    Psychiatric Evaluation     pt states he wants rehab for crack cocaine and an admission for psych med reconciliation  pt denies SI/HI/VH/AH     Patient is a 27-year-old male with a history of autism spectrum disorder, intellectual functioning disability, and schizoaffective schizophrenia coming in today patient stating that he was told to come here for help for my drug addiction and my depression   Patient is not suicidal, homicidal, and denies having any auditory hallucinations or visual hallucinations  Patient states he talk to his therapist which she has a card with him  Patient states he last used cocaine and smoked crack cocaine approximately 2 weeks ago  He denies any heroin abuse  He states that he had attempted suicide several years ago but cannot remember when  History provided by:  Patient   used: No    Psychiatric Evaluation   Presenting symptoms: depression    Degree of incapacity (severity): Moderate  Onset quality:  Gradual  Timing:  Constant  Progression:  Waxing and waning  Chronicity:  Recurrent  Context: drug abuse and noncompliance    Treatment compliance:  Unable to specify  Relieved by:  None tried  Worsened by:  Nothing  Ineffective treatments:  None tried  Associated symptoms: anxiety    Associated symptoms: no abdominal pain and no chest pain    Risk factors: hx of mental illness    Risk factors: no hx of suicide attempts, no neurological disease and no recent psychiatric admission        Prior to Admission Medications   Prescriptions Last Dose Informant Patient Reported?  Taking?   acetaminophen (TYLENOL) 500 mg tablet Not Taking at Unknown time  No No   Sig: Take 1 tablet (500 mg total) by mouth every 6 (six) hours as needed for mild pain   Patient not taking: Reported on 8/3/2020   albuterol (PROVENTIL HFA,VENTOLIN HFA) 90 mcg/act inhaler Not Taking at Unknown time  No No   Sig: Inhale 2 puffs every 4 (four) hours as needed for wheezing   Patient not taking: Reported on 8/3/2020   cloNIDine (CATAPRES) 0 1 mg tablet Not Taking at Unknown time  Yes No   Sig: Take 0 1 mg by mouth  diphenhydrAMINE (BENADRYL) 25 mg tablet Not Taking at Unknown time  No No   Sig: Take 1 tablet (25 mg total) by mouth every 6 (six) hours   Patient not taking: Reported on 8/3/2020   divalproex sodium (DEPAKOTE ER) 250 mg 24 hr tablet Not Taking at Unknown time  Yes No   Sig: Take 250 mg by mouth  fluticasone (FLONASE) 50 mcg/act nasal spray Not Taking at Unknown time  No No   Si spray into each nostril daily   Patient not taking: Reported on 8/3/2020   ibuprofen (MOTRIN) 400 mg tablet Not Taking at Unknown time  No No   Sig: Take 1 tablet (400 mg total) by mouth every 6 (six) hours as needed for moderate pain   Patient not taking: Reported on 8/3/2020   ondansetron (ZOFRAN-ODT) 4 mg disintegrating tablet Not Taking at Unknown time  No No   Sig: Take 1 tablet (4 mg total) by mouth every 8 (eight) hours as needed for nausea   Patient not taking: Reported on 8/3/2020   pantoprazole (PROTONIX) 40 mg tablet Not Taking at Unknown time  No No   Sig: Take 1 tablet (40 mg total) by mouth daily     Patient not taking: Reported on 8/3/2020   simethicone (MYLICON) 80 mg chewable tablet Not Taking at Unknown time  No No   Sig: Chew 1 tablet (80 mg total) every 6 (six) hours as needed for flatulence   Patient not taking: Reported on 8/3/2020   sucralfate (CARAFATE) 1 g/10 mL suspension Not Taking at Unknown time  No No   Sig: Take 10 mL (1 g total) by mouth 2 (two) times a day as needed (Abdominal pain)   Patient not taking: Reported on 8/3/2020      Facility-Administered Medications: None       Past Medical History:   Diagnosis Date    Asthma     Autism spectrum disorder     Bipolar 1 disorder (HCC)     Cognitive impairment     Depression     Intellectual functioning disability     Intestinal disaccharidase deficiency     Mood swings  Psychiatric illness     Schizo-affective schizophrenia ([facility] [address] )     Self-injurious behavior        Past Surgical History:   Procedure Laterality Date    FRACTURE SURGERY      SHOULDER SURGERY         History reviewed  No pertinent family history  I have reviewed and agree with the history as documented  E-Cigarette/Vaping    E-Cigarette Use Never User      E-Cigarette/Vaping Substances     Social History     Tobacco Use    Smoking status: Former Smoker     Packs/day: 0 20     Types: Cigarettes    Smokeless tobacco: Never Used   Substance Use Topics    Alcohol use: No    Drug use: Yes     Types: "Crack" cocaine, Cocaine       Review of Systems   Constitutional: Negative  Negative for diaphoresis and fever  HENT: Negative  Negative for ear pain and sore throat  Eyes: Negative  Negative for visual disturbance  Respiratory: Negative  Negative for chest tightness and shortness of breath  Cardiovascular: Negative  Negative for chest pain and palpitations  Gastrointestinal: Negative  Negative for abdominal pain, nausea and vomiting  Genitourinary: Negative  Negative for difficulty urinating and dysuria  Musculoskeletal: Negative  Negative for back pain and neck pain  Skin: Negative for rash  Neurological: Negative for weakness  Psychiatric/Behavioral: Negative for confusion  The patient is nervous/anxious  All other systems reviewed and are negative  Physical Exam  Physical Exam  Vitals signs and nursing note reviewed  Constitutional:       General: He is not in acute distress  Appearance: He is well-developed  He is not diaphoretic  HENT:      Head: Normocephalic and atraumatic  Comments: Patient maintaining airway and maintaining secretions  Uvula midline without edema  Eyes:      Conjunctiva/sclera: Conjunctivae normal       Pupils: Pupils are equal, round, and reactive to light  Neck:      Musculoskeletal: Normal range of motion and neck supple  Cardiovascular:      Rate and Rhythm: Normal rate and regular rhythm  Heart sounds: Normal heart sounds  No murmur  Pulmonary:      Effort: Pulmonary effort is normal  No respiratory distress  Breath sounds: Normal breath sounds  No stridor  Abdominal:      General: Bowel sounds are normal  There is no distension  Palpations: Abdomen is soft  Tenderness: There is no abdominal tenderness  Musculoskeletal: Normal range of motion  Skin:     General: Skin is warm  Capillary Refill: Capillary refill takes less than 2 seconds  Neurological:      Mental Status: He is alert and oriented to person, place, and time  Cranial Nerves: No cranial nerve deficit  Comments: GCS 15  No slurred speech  No facial asymmetry  No ataxia     Psychiatric:         Mood and Affect: Mood is anxious  Speech: Speech is tangential          Thought Content:  Thought content normal          Vital Signs  ED Triage Vitals [08/03/20 1644]   Temperature Pulse Respirations Blood Pressure SpO2   98 9 °F (37 2 °C) 91 16 (!) 143/104 100 %      Temp Source Heart Rate Source Patient Position - Orthostatic VS BP Location FiO2 (%)   Tympanic Monitor Sitting Left arm --      Pain Score       --           Vitals:    08/03/20 1644 08/03/20 1835   BP: (!) 143/104 132/88   Pulse: 91 (!) 106   Patient Position - Orthostatic VS: Sitting Sitting         Visual Acuity      ED Medications  Medications   LORazepam (ATIVAN) tablet 1 mg (1 mg Oral Given 8/3/20 1721)       Diagnostic Studies  Results Reviewed     Procedure Component Value Units Date/Time    Rapid drug screen, urine [[id number]]  (Normal) Collected:  08/03/20 1812    Lab Status:  Final result Specimen:  Urine, Other Updated:  08/03/20 1838     Amph/Meth UR Negative     Barbiturate Ur Negative     Benzodiazepine Urine Negative     Cocaine Urine Negative     Methadone Urine Negative     Opiate Urine Negative     PCP Ur Negative     THC Urine Negative Oxycodone Urine Negative    Narrative:       FOR MEDICAL PURPOSES ONLY  IF CONFIRMATION NEEDED PLEASE CONTACT THE LAB WITHIN 5 DAYS  Drug Screen Cutoff Levels:  AMPHETAMINE/METHAMPHETAMINES  1000 ng/mL  BARBITURATES     200 ng/mL  BENZODIAZEPINES     200 ng/mL  COCAINE      300 ng/mL  METHADONE      300 ng/mL  OPIATES      300 ng/mL  PHENCYCLIDINE     25 ng/mL  THC       50 ng/mL  OXYCODONE      100 ng/mL    UA (URINE) with reflex to Scope [[id number]]  (Abnormal) Collected:  08/03/20 1812    Lab Status:  Final result Specimen:  Urine, Other Updated:  08/03/20 1830     Color, UA Marcia     Clarity, UA Clear     Specific [place], UA 1 020     pH, UA 5 0     Leukocytes, UA Negative     Nitrite, UA Negative     Protein, UA 15 (Trace) mg/dl      Glucose, UA Negative mg/dl      Ketones, UA 5 (Trace) mg/dl      Bilirubin, UA Negative     Blood, UA Negative     UROBILINOGEN UA 1 0 mg/dL     Urine Microscopic [[id number]]  (Abnormal) Collected:  08/03/20 1812    Lab Status:  Final result Specimen:  Urine, Other Updated:  08/03/20 1830     RBC, UA 0-1 /hpf      WBC, UA 0-1 /hpf      Epithelial Cells Occasional /hpf      Bacteria, UA Occasional /hpf      Hyaline Casts, UA 1-2 /lpf      MUCUS THREADS Occasional    Novel Coronavirus Andreas ZAZUETA [[id number]]  (Normal) Collected:  08/03/20 1724    Lab Status:  Final result Specimen:  Nares from Nose Updated:  08/03/20 1827     SARS-CoV-2 Negative    Narrative: The specimen collection materials, transport medium, and/or testing methodology utilized in the production of these test results have been proven to be reliable in a limited validation with an abbreviated program under the Emergency Utilization Authorization provided by the FDA  Testing reported as "Presumptive positive" will be confirmed with secondary testing with a reference laboratory to ensure result accuracy    Clinical caution and judgement should be used with the interpretation of these results with consideration of the clinical impression and other laboratory testing  Testing reported as "Positive" or "Negative" has been proven to be accurate according to standard laboratory validation requirements  All testing is performed with control materials showing appropriate reactivity at standard intervals        Valproic acid level, total [[id number]]  (Abnormal) Collected:  08/03/20 1721    Lab Status:  Final result Specimen:  Blood from Arm, Left Updated:  08/03/20 1745     Valproic Acid, Total 10 2 ug/mL     Basic metabolic panel [[id number]]  (Abnormal) Collected:  08/03/20 1721    Lab Status:  Final result Specimen:  Blood from Arm, Left Updated:  08/03/20 1742     Sodium 139 mmol/L      Potassium 4 1 mmol/L      Chloride 101 mmol/L      CO2 29 mmol/L      ANION GAP 9 mmol/L      BUN 17 mg/dL      Creatinine 0 96 mg/dL      Glucose 108 mg/dL      Calcium 9 6 mg/dL      eGFR 121 ml/min/1 73sq m     Narrative:       [place] guidelines for Chronic Kidney Disease (CKD):     Stage 1 with normal or high GFR (GFR > 90 mL/min/1 73 square meters)    Stage 2 Mild CKD (GFR = 60-89 mL/min/1 73 square meters)    Stage 3A Moderate CKD (GFR = 45-59 mL/min/1 73 square meters)    Stage 3B Moderate CKD (GFR = 30-44 mL/min/1 73 square meters)    Stage 4 Severe CKD (GFR = 15-29 mL/min/1 73 square meters)    Stage 5 End Stage CKD (GFR <15 mL/min/1 73 square meters)  Note: GFR calculation is accurate only with a steady state creatinine    POCT alcohol breath test [[id number]]  (Normal) Resulted:  08/03/20 1725    Lab Status:  Final result Updated:  08/03/20 1725     EXTBreath Alcohol 0 000    Fingerstick Glucose (POCT) [[id number]]  (Normal) Collected:  08/03/20 1720    Lab Status:  Final result Updated:  08/03/20 1724     POC Glucose 104 mg/dl                  No orders to display              Procedures  Procedures         ED Course  ED Course as of Aug 03 1906   [facility] Aug 03, 2020   1655 Patient is a 11-year-old male coming in today for possible do a placement for his addiction as well as his psychiatric help  On exam he is anxious but cooperative and well appearing  Will check basic labs given his past history is supposed to be on valproic acid  Will check COVID, urine, drug screen as well    Portions of the record may have been created with voice recognition software  Occasional wrong word or "sound a like" substitutions may have occurred due to the inherent limitations of voice recognition software  Read the chart carefully and recognize, using context, where substitutions have occurred  1730 Patient's alcohol negative  Fingerstick glucose 104        1747 Labs are stable  Pending UA and COVID      1828 Crisis was in for evaluation  1831 Urine with mild ketones  Pending RUDS  Crisis met with patient and patient does follow with [facility]  Patient has fluctuating timeline of drug abuse and keeps asking for more food and/or to shower  No grounds for 201 or 302  Will dc home  US AUDIT      Most Recent Value   Initial Alcohol Screen: US AUDIT-C    1  How often do you have a drink containing alcohol?  0 Filed at: 08/03/2020 1645   Audit-C Score  0 Filed at: 08/03/2020 1645                  RONN/DAST-10      Most Recent Value   How many times in the past year have you    Used an illegal drug or used a prescription medication for non-medical reasons? Once or Twice Filed at: 08/03/2020 1645   In the past 12 months      1  Have you used drugs other than those required for medical reasons? 0 Filed at: 08/03/2020 1645   3  Have you had medical problems as a result of your drug use (e g , memory loss, hepatitis, convulsions, bleeding, etc )? 1 Filed at: 08/03/2020 1645   5  Do you ever feel bad or guilty about your drug use? 0 Filed at: 08/03/2020 1645   6  Does your spouse (or parent) ever complain about your involvement with drugs? 0 Filed at: 08/03/2020 1645   7   Have you neglected your family because of your use of drugs? 0 Filed at: 08/03/2020 1645   9  Have you ever experienced withdrawal symptoms (felt sick) when you stopped taking drugs? 0 Filed at: 08/03/2020 1645   10  Are you always able to stop using drugs when you want to?  0 Filed at: 08/03/2020 1645                                MDM      Disposition  Final diagnoses:   Encounter for psychological evaluation   Depression   Homelessness     Time reflects when diagnosis was documented in both MDM as applicable and the Disposition within this note     Time User Action Codes Description Comment    8/3/2020  6:32 PM Johann Ramoss Add [Z00 8] Encounter for psychological evaluation     8/3/2020  6:32 PM Johann Ramoss Add [F32 9] Depression     8/3/2020  6:33 PM Johann Ramoss Add [Z59 0] Homelessness       ED Disposition     ED Disposition Condition Date/Time Comment    Discharge Stable Mon Aug 3, 2020  6:32 PM Galo Dickerson discharge to home/self care  MD Documentation      Most Recent Value   Sending MD Dr Loya Eagle up With Specialties Details Why Contact Info Additional [address] Family Medicine Schedule an appointment as soon as possible for a visit in 1 week  [address], [address] [facility] [address]  [address], [address], [address] [place], [place], [address]          Patient's Medications   Discharge Prescriptions    No medications on file     No discharge procedures on file      PDMP Review     None          ED Provider  Electronically Signed by           Brianna Hauser DO  08/03/20 7158

## 2020-08-03 NOTE — ED NOTES
InsuranceEVS (Eligibility Verification System) called - [phone]    Automated system indicates: *[facility]

## 2020-08-03 NOTE — ED TRIAGE NOTES
Superficial abrasions to left forearm, self inflicted  Pt states he does this to release pain and not to harm himself

## 2020-08-04 ENCOUNTER — HOSPITAL ENCOUNTER (EMERGENCY)
Facility: HOSPITAL | Age: 30
Discharge: HOME | End: 2020-08-04
Attending: EMERGENCY MEDICINE
Payer: COMMERCIAL

## 2020-08-04 VITALS
RESPIRATION RATE: 16 BRPM | DIASTOLIC BLOOD PRESSURE: 76 MMHG | TEMPERATURE: 98.5 F | BODY MASS INDEX: 25.97 KG/M2 | OXYGEN SATURATION: 97 % | HEART RATE: 105 BPM | WEIGHT: 142 LBS | SYSTOLIC BLOOD PRESSURE: 132 MMHG

## 2020-08-04 DIAGNOSIS — Z13.9 ENCOUNTER FOR MEDICAL SCREENING EXAMINATION: Primary | ICD-10-CM

## 2020-08-04 PROCEDURE — 99281 EMR DPT VST MAYX REQ PHY/QHP: CPT | Performed by: EMERGENCY MEDICINE

## 2020-08-04 PROCEDURE — 99283 EMERGENCY DEPT VISIT LOW MDM: CPT

## 2020-08-04 NOTE — ED PROVIDER NOTES
History  Chief Complaint   Patient presents with    Psychiatric Evaluation     pt has no complaints  pt brought in for being in park  pt denies SI/HI/AH/VH     30 yo male with a history of asthma, autism, bipolar disorder, depression, schizophrenia, and polysubstance abuse brought to the ED by [employer] for a medical screening examination  Police reportedly found the patient "wandering in a park"  He has absolutely no complaints and does not know why he was brought to the ED  He denies SI, HI, and hallucinations  He denies any recent substance abuse  Prior to Admission Medications   Prescriptions Last Dose Informant Patient Reported? Taking?   acetaminophen (TYLENOL) 500 mg tablet   No No   Sig: Take 1 tablet (500 mg total) by mouth every 6 (six) hours as needed for mild pain   Patient not taking: Reported on 8/3/2020   albuterol (PROVENTIL HFA,VENTOLIN HFA) 90 mcg/act inhaler   No No   Sig: Inhale 2 puffs every 4 (four) hours as needed for wheezing   Patient not taking: Reported on 8/3/2020   cloNIDine (CATAPRES) 0 1 mg tablet   Yes No   Sig: Take 0 1 mg by mouth  diphenhydrAMINE (BENADRYL) 25 mg tablet   No No   Sig: Take 1 tablet (25 mg total) by mouth every 6 (six) hours   Patient not taking: Reported on 8/3/2020   divalproex sodium (DEPAKOTE ER) 250 mg 24 hr tablet   Yes No   Sig: Take 250 mg by mouth  fluticasone (FLONASE) 50 mcg/act nasal spray   No No   Si spray into each nostril daily   Patient not taking: Reported on 8/3/2020   ibuprofen (MOTRIN) 400 mg tablet   No No   Sig: Take 1 tablet (400 mg total) by mouth every 6 (six) hours as needed for moderate pain   Patient not taking: Reported on 8/3/2020   ondansetron (ZOFRAN-ODT) 4 mg disintegrating tablet   No No   Sig: Take 1 tablet (4 mg total) by mouth every 8 (eight) hours as needed for nausea   Patient not taking: Reported on 8/3/2020   pantoprazole (PROTONIX) 40 mg tablet   No No   Sig: Take 1 tablet (40 mg total) by mouth daily     Patient not taking: Reported on 8/3/2020   simethicone (MYLICON) 80 mg chewable tablet   No No   Sig: Chew 1 tablet (80 mg total) every 6 (six) hours as needed for flatulence   Patient not taking: Reported on 8/3/2020   sucralfate (CARAFATE) 1 g/10 mL suspension   No No   Sig: Take 10 mL (1 g total) by mouth 2 (two) times a day as needed (Abdominal pain)   Patient not taking: Reported on 8/3/2020      Facility-Administered Medications: None       Past Medical History:   Diagnosis Date    Asthma     Autism spectrum disorder     Bipolar 1 disorder (HCC)     Cognitive impairment     Depression     Intellectual functioning disability     Intestinal disaccharidase deficiency     Mood swings     Psychiatric illness     Schizo-affective schizophrenia ([facility] [address] )     Self-injurious behavior        Past Surgical History:   Procedure Laterality Date    FRACTURE SURGERY      SHOULDER SURGERY         History reviewed  No pertinent family history  I have reviewed and agree with the history as documented  E-Cigarette/Vaping    E-Cigarette Use Never User      E-Cigarette/Vaping Substances     Social History     Tobacco Use    Smoking status: Former Smoker     Packs/day: 0 20     Types: Cigarettes    Smokeless tobacco: Never Used   Substance Use Topics    Alcohol use: No    Drug use: Not Currently     Types: "Crack" cocaine, Cocaine       Review of Systems   Constitutional: Negative for chills and fever  HENT: Negative for sore throat  Respiratory: Negative for cough and shortness of breath  Cardiovascular: Negative for chest pain and palpitations  Gastrointestinal: Negative for abdominal pain, diarrhea, nausea and vomiting  Endocrine: Negative for cold intolerance and heat intolerance  Genitourinary: Negative for dysuria and flank pain  Musculoskeletal: Negative for back pain  Skin: Negative for rash  Allergic/Immunologic: Negative for immunocompromised state  Neurological: Negative for headaches  Hematological: Negative for adenopathy  Psychiatric/Behavioral: Negative for hallucinations and suicidal ideas  The patient is not nervous/anxious  Physical Exam  Physical Exam  Constitutional:       General: He is not in acute distress  Appearance: He is well-developed  HENT:      Head: Normocephalic and atraumatic  Eyes:      Pupils: Pupils are equal, round, and reactive to light  Neck:      Musculoskeletal: Normal range of motion and neck supple  Cardiovascular:      Rate and Rhythm: Normal rate and regular rhythm  Pulmonary:      Effort: Pulmonary effort is normal  No respiratory distress  Breath sounds: Normal breath sounds  Abdominal:      General: There is no distension  Palpations: Abdomen is soft  Tenderness: There is no abdominal tenderness  Musculoskeletal: Normal range of motion  Skin:     General: Skin is warm and dry  Neurological:      Mental Status: He is alert and oriented to person, place, and time  Psychiatric:         Attention and Perception: Attention normal  He does not perceive auditory or visual hallucinations  Mood and Affect: Mood normal          Speech: Speech normal          Behavior: Behavior is cooperative  Thought Content: Thought content normal  Thought content does not include homicidal ideation           Vital Signs  ED Triage Vitals [08/04/20 0124]   Temperature Pulse Respirations Blood Pressure SpO2   98 5 °F (36 9 °C) 105 16 132/76 97 %      Temp Source Heart Rate Source Patient Position - Orthostatic VS BP Location FiO2 (%)   Tympanic Monitor Sitting Left arm --      Pain Score       --           Vitals:    08/04/20 0124   BP: 132/76   Pulse: 105   Patient Position - Orthostatic VS: Sitting         Visual Acuity      ED Medications  Medications - No data to display    Diagnostic Studies  Results Reviewed     None                 No orders to display              Procedures  Procedures         ED Course       US AUDIT      Most Recent Value   Initial Alcohol Screen: US AUDIT-C    1  How often do you have a drink containing alcohol?  0 Filed at: 08/04/2020 0125   Audit-C Score  0 Filed at: 08/04/2020 0125                  RONN/DAST-10      Most Recent Value   How many times in the past year have you    Used an illegal drug or used a prescription medication for non-medical reasons? Never Filed at: 08/04/2020 0125                                MDM  Number of Diagnoses or Management Options  Encounter for medical screening examination:   Diagnosis management comments: The patient is very well appearing with stable vital signs and a benign physical exam  He adamantly denies SI, HI, and hallucinations  He says he feels fine and is requesting discharge  No acute medical or psychiatric emergencies were identified  The patient does not appear acutely intoxicated and seems capable of making his own medical decisions  Plan for follow up with his PCP later this week  The patient is agreeable to this plan  Strict return precautions provided  Patient Progress  Patient progress: stable        Disposition  Final diagnoses:   Encounter for medical screening examination     Time reflects when diagnosis was documented in both MDM as applicable and the Disposition within this note     Time User Action Codes Description Comment    8/4/2020  1:36 AM Ny Nguyen Add [Z13 9] Encounter for medical screening examination       ED Disposition     ED Disposition Condition Date/Time Comment    Discharge Stable Tue Aug 4, 2020  1:36 AM Andres Soto discharge to home/self care              Follow-up Information     Follow up With Specialties Details Why Contact BRANDEE Bernardo Nurse Practitioner Schedule an appointment as soon as possible for a visit   [address] [facility] [address]  [phone]            Discharge Medication List as of 8/4/2020  1:37 AM      CONTINUE these medications which have NOT CHANGED Details   acetaminophen (TYLENOL) 500 mg tablet Take 1 tablet (500 mg total) by mouth every 6 (six) hours as needed for mild pain, Starting Thu 11/7/2019, Print      albuterol (PROVENTIL HFA,VENTOLIN HFA) 90 mcg/act inhaler Inhale 2 puffs every 4 (four) hours as needed for wheezing, Starting Thu 11/7/2019, Print      cloNIDine (CATAPRES) 0 1 mg tablet Take 0 1 mg by mouth , Until Discontinued, Historical Med      diphenhydrAMINE (BENADRYL) 25 mg tablet Take 1 tablet (25 mg total) by mouth every 6 (six) hours, Starting Mon 12/9/2019, Print      divalproex sodium (DEPAKOTE ER) 250 mg 24 hr tablet Take 250 mg by mouth , Starting 7/25/2016, Until Discontinued, Historical Med      fluticasone (FLONASE) 50 mcg/act nasal spray 1 spray into each nostril daily, Starting Fri 1/4/2019, Print      ibuprofen (MOTRIN) 400 mg tablet Take 1 tablet (400 mg total) by mouth every 6 (six) hours as needed for moderate pain, Starting Thu 11/7/2019, Print      ondansetron (ZOFRAN-ODT) 4 mg disintegrating tablet Take 1 tablet (4 mg total) by mouth every 8 (eight) hours as needed for nausea, Starting Mon 1/21/2019, Print      pantoprazole (PROTONIX) 40 mg tablet Take 1 tablet (40 mg total) by mouth daily  , Starting 9/4/2016, Until Discontinued, Print      simethicone (MYLICON) 80 mg chewable tablet Chew 1 tablet (80 mg total) every 6 (six) hours as needed for flatulence, Starting Fri 11/22/2019, Print      sucralfate (CARAFATE) 1 g/10 mL suspension Take 10 mL (1 g total) by mouth 2 (two) times a day as needed (Abdominal pain), Starting Mon 1/21/2019, Print           No discharge procedures on file      PDMP Review     None          ED Provider  Electronically Signed by           Killian Manriquez MD  08/04/20 1493

## 2020-08-15 ENCOUNTER — HOSPITAL ENCOUNTER (EMERGENCY)
Facility: HOSPITAL | Age: 30
Discharge: HOME | End: 2020-08-15
Attending: EMERGENCY MEDICINE | Admitting: EMERGENCY MEDICINE
Payer: COMMERCIAL

## 2020-08-15 VITALS
HEART RATE: 101 BPM | RESPIRATION RATE: 17 BRPM | OXYGEN SATURATION: 99 % | DIASTOLIC BLOOD PRESSURE: 101 MMHG | SYSTOLIC BLOOD PRESSURE: 145 MMHG | WEIGHT: 142 LBS | BODY MASS INDEX: 25.97 KG/M2 | TEMPERATURE: 98.4 F

## 2020-08-15 DIAGNOSIS — F31.9 BIPOLAR 1 DISORDER (HCC): Primary | ICD-10-CM

## 2020-08-15 PROCEDURE — 99283 EMERGENCY DEPT VISIT LOW MDM: CPT

## 2020-08-15 PROCEDURE — 99282 EMERGENCY DEPT VISIT SF MDM: CPT | Performed by: EMERGENCY MEDICINE
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
## 2020-08-30 ENCOUNTER — HOSPITAL ENCOUNTER (EMERGENCY)
Facility: HOSPITAL | Age: 30
Discharge: HOME | End: 2020-08-30
Attending: EMERGENCY MEDICINE | Admitting: EMERGENCY MEDICINE
Payer: COMMERCIAL

## 2020-08-30 VITALS
DIASTOLIC BLOOD PRESSURE: 92 MMHG | WEIGHT: 145.5 LBS | SYSTOLIC BLOOD PRESSURE: 152 MMHG | BODY MASS INDEX: 26.61 KG/M2 | HEART RATE: 100 BPM | RESPIRATION RATE: 20 BRPM | TEMPERATURE: 98.5 F | OXYGEN SATURATION: 98 %

## 2020-08-30 DIAGNOSIS — Z00.8 ENCOUNTER FOR PSYCHOLOGICAL EVALUATION: Primary | ICD-10-CM

## 2020-08-30 LAB
AMPHETAMINES SERPL QL SCN: NEGATIVE
BARBITURATES UR QL: NEGATIVE
BENZODIAZ UR QL: NEGATIVE
COCAINE UR QL: NEGATIVE
ETHANOL EXG-MCNC: 0 MG/DL
METHADONE UR QL: NEGATIVE
OPIATES UR QL SCN: NEGATIVE
OXYCODONE+OXYMORPHONE UR QL SCN: NEGATIVE
PCP UR QL: NEGATIVE
THC UR QL: NEGATIVE

## 2020-08-30 PROCEDURE — 99281 EMR DPT VST MAYX REQ PHY/QHP: CPT | Performed by: EMERGENCY MEDICINE

## 2020-08-30 PROCEDURE — 80307 DRUG TEST PRSMV CHEM ANLYZR: CPT | Performed by: EMERGENCY MEDICINE

## 2020-08-30 PROCEDURE — 99283 EMERGENCY DEPT VISIT LOW MDM: CPT

## 2020-08-30 PROCEDURE — 82075 ASSAY OF BREATH ETHANOL: CPT | Performed by: EMERGENCY MEDICINE
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
## 2020-08-30 NOTE — ED NOTES
Patient expressed concerns about the "client" that he lives with is a pedophile, requested [employer] be contacted  [employer] currently at bedside        [facility]  08/30/20 3303
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
## 2020-09-03 ENCOUNTER — HOSPITAL ENCOUNTER (EMERGENCY)
Facility: HOSPITAL | Age: 30
Discharge: HOME | End: 2020-09-03
Attending: EMERGENCY MEDICINE | Admitting: EMERGENCY MEDICINE
Payer: COMMERCIAL

## 2020-09-03 VITALS
RESPIRATION RATE: 16 BRPM | WEIGHT: 149.5 LBS | OXYGEN SATURATION: 99 % | SYSTOLIC BLOOD PRESSURE: 143 MMHG | HEART RATE: 89 BPM | TEMPERATURE: 97.5 F | BODY MASS INDEX: 27.34 KG/M2 | DIASTOLIC BLOOD PRESSURE: 89 MMHG

## 2020-09-03 DIAGNOSIS — J30.9 ALLERGIC RHINITIS: Primary | ICD-10-CM

## 2020-09-03 PROCEDURE — 99284 EMERGENCY DEPT VISIT MOD MDM: CPT | Performed by: EMERGENCY MEDICINE

## 2020-09-03 PROCEDURE — 99283 EMERGENCY DEPT VISIT LOW MDM: CPT

## 2020-09-03 RX ORDER — FLUTICASONE PROPIONATE 50 MCG
1 SPRAY, SUSPENSION (ML) NASAL DAILY
Qty: 16 G | Refills: 0 | Status: SHIPPED | OUTPATIENT
Start: 2020-09-03 | End: 2020-10-02

## 2020-09-04 ENCOUNTER — HOSPITAL ENCOUNTER (EMERGENCY)
Facility: HOSPITAL | Age: 30
Discharge: HOME | End: 2020-09-04
Attending: EMERGENCY MEDICINE
Payer: COMMERCIAL

## 2020-09-04 VITALS
WEIGHT: 144.62 LBS | HEART RATE: 90 BPM | TEMPERATURE: 98.5 F | DIASTOLIC BLOOD PRESSURE: 96 MMHG | RESPIRATION RATE: 18 BRPM | OXYGEN SATURATION: 99 % | BODY MASS INDEX: 26.45 KG/M2 | SYSTOLIC BLOOD PRESSURE: 187 MMHG

## 2020-09-04 DIAGNOSIS — F32.A DEPRESSION: Primary | ICD-10-CM

## 2020-09-04 DIAGNOSIS — F31.9 BIPOLAR DISORDER (HCC): ICD-10-CM

## 2020-09-04 DIAGNOSIS — Z59.00 HOMELESSNESS: ICD-10-CM

## 2020-09-04 PROCEDURE — 99284 EMERGENCY DEPT VISIT MOD MDM: CPT

## 2020-09-04 PROCEDURE — 99282 EMERGENCY DEPT VISIT SF MDM: CPT | Performed by: EMERGENCY MEDICINE

## 2020-09-04 SDOH — ECONOMIC STABILITY - HOUSING INSECURITY: HOMELESSNESS UNSPECIFIED: Z59.00

## 2020-09-04 NOTE — ED PROVIDER NOTES
History  Chief Complaint   Patient presents with    Nose Problem     "I just want someone to look into my nose and see if there is something in there?"     32 yo male with a history of asthma, autism, bipolar disorder, depression, schizophrenia, and polysubstance abuse presents to the ED complaining of difficulty breathing out of his right nostril x 3-4 days  The patient says his right nasal passage feels blocked "like something is up there"  He is adamant that he did not put any foreign bodies up his nose  No recent intranasal heroin or cocaine  (+) Clear rhinorrhea and nasal congestion  Symptoms are consistent with past seasonal allergies  No cough, sore throat, or sinus pressure  He denies fevers/chills  No headache  No epistaxis  No other specific complaints  Prior to Admission Medications   Prescriptions Last Dose Informant Patient Reported? Taking?   acetaminophen (TYLENOL) 500 mg tablet   No No   Sig: Take 1 tablet (500 mg total) by mouth every 6 (six) hours as needed for mild pain   Patient not taking: Reported on 8/3/2020   albuterol (PROVENTIL HFA,VENTOLIN HFA) 90 mcg/act inhaler   No No   Sig: Inhale 2 puffs every 4 (four) hours as needed for wheezing   Patient not taking: Reported on 8/3/2020   cloNIDine (CATAPRES) 0 1 mg tablet   Yes No   Sig: Take 0 1 mg by mouth  diphenhydrAMINE (BENADRYL) 25 mg tablet   No No   Sig: Take 1 tablet (25 mg total) by mouth every 6 (six) hours   Patient not taking: Reported on 8/3/2020   divalproex sodium (DEPAKOTE ER) 250 mg 24 hr tablet   Yes No   Sig: Take 250 mg by mouth     fluticasone (FLONASE) 50 mcg/act nasal spray   No No   Si spray into each nostril daily   Patient not taking: Reported on 8/3/2020   ibuprofen (MOTRIN) 400 mg tablet   No No   Sig: Take 1 tablet (400 mg total) by mouth every 6 (six) hours as needed for moderate pain   Patient not taking: Reported on 8/3/2020   ondansetron (ZOFRAN-ODT) 4 mg disintegrating tablet   No No   Sig: Take 1 tablet (4 mg total) by mouth every 8 (eight) hours as needed for nausea   Patient not taking: Reported on 8/3/2020   pantoprazole (PROTONIX) 40 mg tablet   No No   Sig: Take 1 tablet (40 mg total) by mouth daily  Patient not taking: Reported on 8/3/2020   simethicone (MYLICON) 80 mg chewable tablet   No No   Sig: Chew 1 tablet (80 mg total) every 6 (six) hours as needed for flatulence   Patient not taking: Reported on 8/3/2020   sucralfate (CARAFATE) 1 g/10 mL suspension   No No   Sig: Take 10 mL (1 g total) by mouth 2 (two) times a day as needed (Abdominal pain)   Patient not taking: Reported on 8/3/2020      Facility-Administered Medications: None       Past Medical History:   Diagnosis Date    Asthma     Autism spectrum disorder     Bipolar 1 disorder (HCC)     Cognitive impairment     Depression     Intellectual functioning disability     Intestinal disaccharidase deficiency     Mood swings     Psychiatric illness     [facility]ffective schizophrenia ([facility] [address] [facility]     Self-injurious behavior        Past Surgical History:   Procedure Laterality Date    FRACTURE SURGERY      SHOULDER SURGERY         History reviewed  No pertinent family history  I have reviewed and agree with the history as documented  E-Cigarette/Vaping    E-Cigarette Use Never User      E-Cigarette/Vaping Substances     Social History     Tobacco Use    Smoking status: Former Smoker     Types: Cigarettes    Smokeless tobacco: Never Used   Substance Use Topics    Alcohol use: No    Drug use: Not Currently     Types: "Crack" cocaine, Cocaine       Review of Systems   Constitutional: Negative for chills and fever  HENT: Positive for congestion, postnasal drip and rhinorrhea  Negative for sinus pressure and sore throat  Respiratory: Negative for cough and shortness of breath  Cardiovascular: Negative for chest pain and palpitations  Gastrointestinal: Negative for abdominal pain, diarrhea, nausea and vomiting     Endocrine: Negative for cold intolerance and heat intolerance  Genitourinary: Negative for dysuria and flank pain  Musculoskeletal: Negative for back pain  Skin: Negative for rash  Allergic/Immunologic: Negative for immunocompromised state  Neurological: Negative for dizziness, light-headedness and headaches  Hematological: Negative for adenopathy  Psychiatric/Behavioral: The patient is not nervous/anxious  Physical Exam  Physical Exam  Constitutional:       General: He is not in acute distress  Appearance: He is well-developed  HENT:      Head: Normocephalic and atraumatic  Nose: Mucosal edema, congestion and rhinorrhea present  Rhinorrhea is clear  Right Nostril: No foreign body, epistaxis or septal hematoma  Left Nostril: No foreign body or epistaxis  Right Turbinates: Swollen  Right Sinus: No maxillary sinus tenderness or frontal sinus tenderness  Left Sinus: No maxillary sinus tenderness or frontal sinus tenderness  Eyes:      Pupils: Pupils are equal, round, and reactive to light  Neck:      Musculoskeletal: Normal range of motion and neck supple  Cardiovascular:      Rate and Rhythm: Normal rate and regular rhythm  Pulmonary:      Effort: Pulmonary effort is normal  No respiratory distress  Breath sounds: Normal breath sounds  Abdominal:      General: There is no distension  Palpations: Abdomen is soft  Tenderness: There is no abdominal tenderness  Musculoskeletal: Normal range of motion  Skin:     General: Skin is warm and dry  Neurological:      Mental Status: He is alert and oriented to person, place, and time           Vital Signs  ED Triage Vitals [09/03/20 2345]   Temperature Pulse Respirations Blood Pressure SpO2   97 5 °F (36 4 °C) 89 16 143/89 99 %      Temp Source Heart Rate Source Patient Position - Orthostatic VS BP Location FiO2 (%)   Tympanic Monitor Sitting Left arm --      Pain Score       --           Vitals: 09/03/20 2345   BP: 143/89   Pulse: 89   Patient Position - Orthostatic VS: Sitting         Visual Acuity      ED Medications  Medications - No data to display    Diagnostic Studies  Results Reviewed     None                 No orders to display              Procedures  Procedures         ED Course       US AUDIT      Most Recent Value   Initial Alcohol Screen: US AUDIT-C    1  How often do you have a drink containing alcohol?  0 Filed at: 09/03/2020 2341   2  How many drinks containing alcohol do you have on a typical day you are drinking? 0 Filed at: 09/03/2020 2341   3a  Male UNDER 65: How often do you have five or more drinks on one occasion? 0 Filed at: 09/03/2020 2341   3b  FEMALE Any Age, or MALE 65+: How often do you have 4 or more drinks on one occassion? 0 Filed at: 09/03/2020 2341   Audit-C Score  0 Filed at: 09/03/2020 2341                  RONN/DAST-10      Most Recent Value   How many times in the past year have you    Used an illegal drug or used a prescription medication for non-medical reasons? Never Filed at: 09/03/2020 2342                                MDM  Number of Diagnoses or Management Options  Allergic rhinitis:   Diagnosis management comments: The patient is very well appearing with stable vital signs and a benign exam  No foreign body visualized on ENT exam, just moderately swollen right turbinates  Symptoms and exam are most consistent with an allergic rhinitis  Plan for Flonase and Claritin  The patient was instructed to follow up with his PCP next week for reassessment  He is agreeable to this plan  Strict return precautions provided  Patient Progress  Patient progress: stable        Disposition  Final diagnoses:    Allergic rhinitis     Time reflects when diagnosis was documented in both MDM as applicable and the Disposition within this note     Time User Action Codes Description Comment    9/3/2020 11:46 PM Daxa Curran Add [J30 9] Allergic rhinitis       ED Disposition ED Disposition Condition Date/Time Comment    Discharge Stable Thu Sep 3, 2020 11:46 PM Heather Alvarado discharge to home/self care  Follow-up Information     Follow up With Specialties Details Why BRANDEE Damian Nurse Practitioner Schedule an appointment as soon as possible for a visit   [address] [facility] [address]  [phone]            Discharge Medication List as of 9/3/2020 11:47 PM      START taking these medications    Details   !! fluticasone (FLONASE) 50 mcg/act nasal spray 1 spray into each nostril daily, Starting u 9/3/2020, Print       !! - Potential duplicate medications found  Please discuss with provider  CONTINUE these medications which have NOT CHANGED    Details   acetaminophen (TYLENOL) 500 mg tablet Take 1 tablet (500 mg total) by mouth every 6 (six) hours as needed for mild pain, Starting Thu 11/7/2019, Print      albuterol (PROVENTIL HFA,VENTOLIN HFA) 90 mcg/act inhaler Inhale 2 puffs every 4 (four) hours as needed for wheezing, Starting Thu 11/7/2019, Print      cloNIDine (CATAPRES) 0 1 mg tablet Take 0 1 mg by mouth , Until Discontinued, Historical Med      diphenhydrAMINE (BENADRYL) 25 mg tablet Take 1 tablet (25 mg total) by mouth every 6 (six) hours, Starting Mon 12/9/2019, Print      divalproex sodium (DEPAKOTE ER) 250 mg 24 hr tablet Take 250 mg by mouth , Starting 7/25/2016, Until Discontinued, Historical Med      !! fluticasone (FLONASE) 50 mcg/act nasal spray 1 spray into each nostril daily, Starting Fri 1/4/2019, Print      ibuprofen (MOTRIN) 400 mg tablet Take 1 tablet (400 mg total) by mouth every 6 (six) hours as needed for moderate pain, Starting Thu 11/7/2019, Print      ondansetron (ZOFRAN-ODT) 4 mg disintegrating tablet Take 1 tablet (4 mg total) by mouth every 8 (eight) hours as needed for nausea, Starting Mon 1/21/2019, Print      pantoprazole (PROTONIX) 40 mg tablet Take 1 tablet (40 mg total) by mouth daily  , Starting 9/4/2016, Until Discontinued, Print      simethicone (MYLICON) 80 mg chewable tablet Chew 1 tablet (80 mg total) every 6 (six) hours as needed for flatulence, Starting Fri 11/22/2019, Print      sucralfate (CARAFATE) 1 g/10 mL suspension Take 10 mL (1 g total) by mouth 2 (two) times a day as needed (Abdominal pain), Starting Mon 1/21/2019, Print       !! - Potential duplicate medications found  Please discuss with provider  No discharge procedures on file      PDMP Review     None          ED Provider  Electronically Signed by           Cecelia Saavedra MD  09/04/20 0025

## 2020-09-04 NOTE — ED PROVIDER NOTES
History  Chief Complaint   Patient presents with    Psychiatric Evaluation     Pt was living at group home but was kicked out  Currently pt states he is homeless  Pt at this time is coming here for depression and feeling lonely  He denies SI/HI/AH/VH at this time  Pt states just wanted to get out of solitario  32 yo male with bipolar, schizoaffective and autism who was recently kicked out of group home and is currently homeless presenting because he feels depressed, lonely - wants to sleep for a few hours and is due to local shelter this am        History provided by:  Patient   used: No    Psychiatric Evaluation   Presenting symptoms: depression    Presenting symptoms: no hallucinations, no self-mutilation and no suicidal thoughts    Degree of incapacity (severity):  Mild  Onset quality:  Gradual  Timing:  Constant  Progression:  Unchanged  Context: stressful life event (got kicked out of group home)    Relieved by:  Nothing  Worsened by:  Lack of sleep (homelessness)  Ineffective treatments:  None tried  Associated symptoms: anhedonia, feelings of worthlessness and poor judgment    Associated symptoms: no abdominal pain, no anxiety, no chest pain and no headaches    Risk factors: hx of mental illness        Prior to Admission Medications   Prescriptions Last Dose Informant Patient Reported? Taking?   acetaminophen (TYLENOL) 500 mg tablet Not Taking at Unknown time  No No   Sig: Take 1 tablet (500 mg total) by mouth every 6 (six) hours as needed for mild pain   Patient not taking: Reported on 8/3/2020   albuterol (PROVENTIL HFA,VENTOLIN HFA) 90 mcg/act inhaler Not Taking at Unknown time  No No   Sig: Inhale 2 puffs every 4 (four) hours as needed for wheezing   Patient not taking: Reported on 8/3/2020   cloNIDine (CATAPRES) 0 1 mg tablet Not Taking at Unknown time  Yes No   Sig: Take 0 1 mg by mouth     diphenhydrAMINE (BENADRYL) 25 mg tablet Not Taking at Unknown time  No No   Sig: Take 1 tablet (25 mg total) by mouth every 6 (six) hours   Patient not taking: Reported on 8/3/2020   divalproex sodium (DEPAKOTE ER) 250 mg 24 hr tablet Not Taking at Unknown time  Yes No   Sig: Take 250 mg by mouth  fluticasone (FLONASE) 50 mcg/act nasal spray Not Taking at Unknown time  No No   Si spray into each nostril daily   Patient not taking: Reported on 8/3/2020   fluticasone (FLONASE) 50 mcg/act nasal spray Not Taking at Unknown time  No No   Si spray into each nostril daily   Patient not taking: Reported on 2020   ibuprofen (MOTRIN) 400 mg tablet Not Taking at Unknown time  No No   Sig: Take 1 tablet (400 mg total) by mouth every 6 (six) hours as needed for moderate pain   Patient not taking: Reported on 8/3/2020   ondansetron (ZOFRAN-ODT) 4 mg disintegrating tablet Not Taking at Unknown time  No No   Sig: Take 1 tablet (4 mg total) by mouth every 8 (eight) hours as needed for nausea   Patient not taking: Reported on 8/3/2020   pantoprazole (PROTONIX) 40 mg tablet Not Taking at Unknown time  No No   Sig: Take 1 tablet (40 mg total) by mouth daily     Patient not taking: Reported on 8/3/2020   simethicone (MYLICON) 80 mg chewable tablet Not Taking at Unknown time  No No   Sig: Chew 1 tablet (80 mg total) every 6 (six) hours as needed for flatulence   Patient not taking: Reported on 8/3/2020   sucralfate (CARAFATE) 1 g/10 mL suspension Not Taking at Unknown time  No No   Sig: Take 10 mL (1 g total) by mouth 2 (two) times a day as needed (Abdominal pain)   Patient not taking: Reported on 8/3/2020      Facility-Administered Medications: None       Past Medical History:   Diagnosis Date    Asthma     Autism spectrum disorder     Bipolar 1 disorder (HCC)     Cognitive impairment     Depression     Intellectual functioning disability     Intestinal disaccharidase deficiency     Mood swings     Psychiatric illness     Schizo-affective schizophrenia ([facility] [address] )     Self-injurious behavior        Past Surgical History:   Procedure Laterality Date    FRACTURE SURGERY      SHOULDER SURGERY         History reviewed  No pertinent family history  I have reviewed and agree with the history as documented  E-Cigarette/Vaping    E-Cigarette Use Never User      E-Cigarette/Vaping Substances     Social History     Tobacco Use    Smoking status: Current Every Day Smoker     Packs/day: 0 25     Types: Cigarettes    Smokeless tobacco: Never Used   Substance Use Topics    Alcohol use: No    Drug use: Not Currently     Types: "Crack" cocaine, Cocaine       Review of Systems   Constitutional: Negative for chills and fever  HENT: Negative for congestion and sore throat  Eyes: Negative for visual disturbance  Respiratory: Negative for shortness of breath and wheezing  Cardiovascular: Negative for chest pain and palpitations  Gastrointestinal: Negative for abdominal pain, diarrhea, nausea and vomiting  Genitourinary: Negative for dysuria  Musculoskeletal: Negative for neck pain and neck stiffness  Skin: Negative for pallor and rash  Neurological: Negative for headaches  Psychiatric/Behavioral: Negative for confusion, hallucinations, self-injury and suicidal ideas  The patient is not nervous/anxious  All other systems reviewed and are negative  Physical Exam  Physical Exam  Vitals signs and nursing note reviewed  Constitutional:       General: He is not in acute distress  Appearance: He is well-developed  Comments: Appears much younger than stated age   HENT:      Head: Normocephalic and atraumatic  Right Ear: External ear normal       Left Ear: External ear normal    Eyes:      Extraocular Movements: Extraocular movements intact  Neck:      Musculoskeletal: Neck supple  Cardiovascular:      Rate and Rhythm: Normal rate and regular rhythm  Heart sounds: No murmur  Pulmonary:      Effort: Pulmonary effort is normal       Breath sounds: Normal breath sounds  Abdominal:      General: Bowel sounds are normal  There is no distension  Palpations: Abdomen is soft  Tenderness: There is no abdominal tenderness  Musculoskeletal: Normal range of motion  Skin:     General: Skin is warm  Coloration: Skin is not pale  Findings: No rash  Neurological:      Mental Status: He is alert and oriented to person, place, and time  Psychiatric:         Mood and Affect: Mood normal          Behavior: Behavior normal          Vital Signs  ED Triage Vitals [09/04/20 0304]   Temperature Pulse Respirations Blood Pressure SpO2   98 5 °F (36 9 °C) 90 18 (!) 187/96 99 %      Temp Source Heart Rate Source Patient Position - Orthostatic VS BP Location FiO2 (%)   Temporal Monitor Sitting Right arm --      Pain Score       --           Vitals:    09/04/20 0304   BP: (!) 187/96   Pulse: 90   Patient Position - Orthostatic VS: Sitting         Visual Acuity      ED Medications  Medications - No data to display    Diagnostic Studies  Results Reviewed     None                 No orders to display              Procedures  Procedures         ED Course  ED Course as of Sep 04 0455   Fri Sep 04, 2020   0310 Pt seen and examined  30 yo male with bipolar, schizoaffective and autism who was recently kicked out of group home and is currently homeless presenting because he feels depressed, lonely - wants to sleep for a few hours and is due to local shelter this am   Denies SI or HI       0454 Pt asking to go, feels good after nap  US AUDIT      Most Recent Value   Initial Alcohol Screen: US AUDIT-C    1  How often do you have a drink containing alcohol?  0 Filed at: 09/04/2020 0302   2  How many drinks containing alcohol do you have on a typical day you are drinking? 0 Filed at: 09/04/2020 0302   3a  Male UNDER 65: How often do you have five or more drinks on one occasion?   0 Filed at: 09/04/2020 0302   Audit-C Score  0 Filed at: 09/04/2020 0302                  RONN/DAST-10 Most Recent Value   How many times in the past year have you    Used an illegal drug or used a prescription medication for non-medical reasons? Never Filed at: 09/04/2020 0302                                MDM      Disposition  Final diagnoses:   Depression   Homelessness   Bipolar disorder ([facility] [address] )     Time reflects when diagnosis was documented in both MDM as applicable and the Disposition within this note     Time User Action Codes Description Comment    9/4/2020  4:54 AM Belynda Lambing Add [F32 9] Depression     9/4/2020  4:54 AM Aurther Stagers M Add [Z59 0] Homelessness     9/4/2020  4:54 AM Aurther Stagers M Add [F31 9] Bipolar disorder [facility])       ED Disposition     ED Disposition Condition Date/Time Comment    Discharge Stable Fri Sep 4, 2020  4:54 AM Daiana Vazquez discharge to home/self care  Follow-up Information     Follow up With Specialties Details Why Contact Info    BRANDEE Palomo Nurse Practitioner Schedule an appointment as soon as possible for a visit  As needed [facility] [address]  [phone]            Patient's Medications   Discharge Prescriptions    No medications on file     No discharge procedures on file      PDMP Review     None          ED Provider  Electronically Signed by           Juan Barfield DO  09/04/20 0636

## 2020-09-04 NOTE — ED NOTES
Patient reports that his right nostril feels swollen and it just started        Saba Su, NIMO  09/03/20 6566

## 2020-09-21 ENCOUNTER — HOSPITAL ENCOUNTER (EMERGENCY)
Facility: HOSPITAL | Age: 30
Discharge: HOME | End: 2020-09-21
Attending: EMERGENCY MEDICINE
Payer: COMMERCIAL

## 2020-09-21 VITALS
BODY MASS INDEX: 26.45 KG/M2 | OXYGEN SATURATION: 98 % | DIASTOLIC BLOOD PRESSURE: 97 MMHG | TEMPERATURE: 97 F | HEART RATE: 87 BPM | SYSTOLIC BLOOD PRESSURE: 139 MMHG | RESPIRATION RATE: 18 BRPM | WEIGHT: 144.62 LBS

## 2020-09-21 DIAGNOSIS — R45.1 AGITATION: Primary | ICD-10-CM

## 2020-09-21 LAB
AMPHETAMINES SERPL QL SCN: NEGATIVE
BARBITURATES UR QL: NEGATIVE
BENZODIAZ UR QL: NEGATIVE
COCAINE UR QL: NEGATIVE
ETHANOL SERPL-MCNC: <10 MG/DL (ref 0–10)
METHADONE UR QL: NEGATIVE
OPIATES UR QL SCN: NEGATIVE
OXYCODONE+OXYMORPHONE UR QL SCN: NEGATIVE
PCP UR QL: NEGATIVE
SARS-COV-2 RNA RESP QL NAA+PROBE: NEGATIVE
THC UR QL: NEGATIVE

## 2020-09-21 PROCEDURE — 80320 DRUG SCREEN QUANTALCOHOLS: CPT | Performed by: EMERGENCY MEDICINE

## 2020-09-21 PROCEDURE — 82075 ASSAY OF BREATH ETHANOL: CPT | Performed by: EMERGENCY MEDICINE

## 2020-09-21 PROCEDURE — 87635 SARS-COV-2 COVID-19 AMP PRB: CPT | Performed by: EMERGENCY MEDICINE

## 2020-09-21 PROCEDURE — 99283 EMERGENCY DEPT VISIT LOW MDM: CPT | Performed by: PSYCHIATRY & NEUROLOGY

## 2020-09-21 PROCEDURE — 99282 EMERGENCY DEPT VISIT SF MDM: CPT | Performed by: EMERGENCY MEDICINE

## 2020-09-21 PROCEDURE — 80307 DRUG TEST PRSMV CHEM ANLYZR: CPT | Performed by: EMERGENCY MEDICINE

## 2020-09-21 PROCEDURE — 99284 EMERGENCY DEPT VISIT MOD MDM: CPT

## 2020-09-21 PROCEDURE — 36415 COLL VENOUS BLD VENIPUNCTURE: CPT | Performed by: EMERGENCY MEDICINE

## 2020-09-21 NOTE — ED NOTES
SPoke to patient  He is asking to leave  He said that he is not followed by any outpt services  He continues to be homeless, but goes to [place] for all him meals/ He is currently living in the [facility] / ?????.??    Gave him information on [employer] for a  to assist him in the community
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
## 2020-09-21 NOTE — ED NOTES
Pt requesting to leave    Psych residents made aware they will speak w/ patient      Rise Books, RN  09/21/20 6036
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
## 2020-09-30 ENCOUNTER — HOSPITAL ENCOUNTER (EMERGENCY)
Facility: HOSPITAL | Age: 30
Discharge: HOME | End: 2020-09-30
Attending: EMERGENCY MEDICINE
Payer: COMMERCIAL

## 2020-09-30 ENCOUNTER — APPOINTMENT (EMERGENCY)
Dept: RADIOLOGY | Facility: HOSPITAL | Age: 30
End: 2020-09-30
Payer: COMMERCIAL

## 2020-09-30 VITALS
TEMPERATURE: 97.4 F | SYSTOLIC BLOOD PRESSURE: 138 MMHG | DIASTOLIC BLOOD PRESSURE: 96 MMHG | WEIGHT: 144.1 LBS | BODY MASS INDEX: 26.36 KG/M2 | HEART RATE: 88 BPM | RESPIRATION RATE: 18 BRPM | OXYGEN SATURATION: 97 %

## 2020-09-30 DIAGNOSIS — M79.603 ARM PAIN: ICD-10-CM

## 2020-09-30 DIAGNOSIS — T14.8XXA ABRASION: ICD-10-CM
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
## 2020-09-30 PROCEDURE — 73090 X-RAY EXAM OF FOREARM: CPT

## 2020-09-30 PROCEDURE — 99284 EMERGENCY DEPT VISIT MOD MDM: CPT

## 2020-09-30 PROCEDURE — 99282 EMERGENCY DEPT VISIT SF MDM: CPT | Performed by: EMERGENCY MEDICINE

## 2020-09-30 RX ORDER — ACETAMINOPHEN 325 MG/1
650 TABLET ORAL ONCE
Status: COMPLETED | OUTPATIENT
Start: 2020-09-30 | End: 2020-09-30

## 2020-09-30 RX ADMIN — ACETAMINOPHEN 650 MG: 325 TABLET ORAL at 09:03

## 2020-10-02 ENCOUNTER — HOSPITAL ENCOUNTER (EMERGENCY)
Facility: HOSPITAL | Age: 30
Discharge: HOME | End: 2020-10-02
Attending: EMERGENCY MEDICINE
Payer: COMMERCIAL

## 2020-10-02 VITALS
WEIGHT: 135.14 LBS | BODY MASS INDEX: 24.72 KG/M2 | HEART RATE: 85 BPM | RESPIRATION RATE: 18 BRPM | OXYGEN SATURATION: 99 % | DIASTOLIC BLOOD PRESSURE: 47 MMHG | SYSTOLIC BLOOD PRESSURE: 148 MMHG | TEMPERATURE: 98.6 F

## 2020-10-02 DIAGNOSIS — F41.9 ANXIETY: Primary | ICD-10-CM

## 2020-10-02 LAB
AMPHETAMINES SERPL QL SCN: NEGATIVE
BARBITURATES UR QL: NEGATIVE
BENZODIAZ UR QL: NEGATIVE
COCAINE UR QL: NEGATIVE
ETHANOL EXG-MCNC: 0 MG/DL
METHADONE UR QL: NEGATIVE
OPIATES UR QL SCN: NEGATIVE
OXYCODONE+OXYMORPHONE UR QL SCN: NEGATIVE
PCP UR QL: NEGATIVE
SARS-COV-2 RNA RESP QL NAA+PROBE: NEGATIVE
THC UR QL: NEGATIVE

## 2020-10-02 PROCEDURE — 80307 DRUG TEST PRSMV CHEM ANLYZR: CPT | Performed by: EMERGENCY MEDICINE

## 2020-10-02 PROCEDURE — 99282 EMERGENCY DEPT VISIT SF MDM: CPT | Performed by: EMERGENCY MEDICINE

## 2020-10-02 PROCEDURE — 87635 SARS-COV-2 COVID-19 AMP PRB: CPT | Performed by: EMERGENCY MEDICINE

## 2020-10-02 PROCEDURE — 82075 ASSAY OF BREATH ETHANOL: CPT | Performed by: EMERGENCY MEDICINE

## 2020-10-02 PROCEDURE — 99284 EMERGENCY DEPT VISIT MOD MDM: CPT

## 2020-10-02 RX ORDER — DIVALPROEX SODIUM 500 MG/1
1000 TABLET, EXTENDED RELEASE ORAL DAILY
COMMUNITY
Start: 2012-04-04

## 2020-10-02 RX ORDER — CLONIDINE HYDROCHLORIDE 0.1 MG/1
TABLET ORAL 2 TIMES DAILY
COMMUNITY
Start: 2012-04-04 | End: 2021-03-22 | Stop reason: HOSPADM

## 2020-10-02 RX ORDER — SAW/PYGEUM/BETA/HERB/D3/B6/ZN 30 MG-25MG
10 CAPSULE ORAL
COMMUNITY

## 2020-10-02 RX ORDER — QUETIAPINE FUMARATE 400 MG/1
400 TABLET, FILM COATED ORAL 2 TIMES DAILY
COMMUNITY

## 2020-10-02 RX ORDER — TRAZODONE HYDROCHLORIDE 100 MG/1
100 TABLET ORAL
COMMUNITY
End: 2021-03-19

## 2020-11-20 ENCOUNTER — HOSPITAL ENCOUNTER (EMERGENCY)
Facility: HOSPITAL | Age: 30
Discharge: HOME | End: 2020-11-20
Attending: EMERGENCY MEDICINE | Admitting: EMERGENCY MEDICINE
Payer: COMMERCIAL

## 2020-11-20 VITALS
HEART RATE: 107 BPM | TEMPERATURE: 98.8 F | WEIGHT: 135 LBS | OXYGEN SATURATION: 98 % | SYSTOLIC BLOOD PRESSURE: 135 MMHG | RESPIRATION RATE: 18 BRPM | BODY MASS INDEX: 24.69 KG/M2 | DIASTOLIC BLOOD PRESSURE: 86 MMHG

## 2020-11-20 DIAGNOSIS — Z13.9 ENCOUNTER FOR MEDICAL SCREENING EXAMINATION: ICD-10-CM

## 2020-11-20 PROCEDURE — 99281 EMR DPT VST MAYX REQ PHY/QHP: CPT | Performed by: PHYSICIAN ASSISTANT

## 2020-11-20 PROCEDURE — 99283 EMERGENCY DEPT VISIT LOW MDM: CPT

## 2020-12-02 ENCOUNTER — HOSPITAL ENCOUNTER (EMERGENCY)
Facility: HOSPITAL | Age: 30
Discharge: HOME | End: 2020-12-02
Attending: EMERGENCY MEDICINE
Payer: COMMERCIAL

## 2020-12-02 VITALS
OXYGEN SATURATION: 96 % | DIASTOLIC BLOOD PRESSURE: 86 MMHG | WEIGHT: 156.97 LBS | RESPIRATION RATE: 18 BRPM | BODY MASS INDEX: 28.71 KG/M2 | TEMPERATURE: 99.1 F | SYSTOLIC BLOOD PRESSURE: 135 MMHG | HEART RATE: 100 BPM

## 2020-12-02 DIAGNOSIS — Z76.0 MEDICATION REFILL: Primary | ICD-10-CM

## 2020-12-02 PROCEDURE — 99282 EMERGENCY DEPT VISIT SF MDM: CPT | Performed by: EMERGENCY MEDICINE

## 2020-12-02 PROCEDURE — 99281 EMR DPT VST MAYX REQ PHY/QHP: CPT

## 2020-12-02 RX ORDER — ALBUTEROL SULFATE 90 UG/1
1-2 AEROSOL, METERED RESPIRATORY (INHALATION) EVERY 6 HOURS PRN
Qty: 1 INHALER | Refills: 0 | Status: SHIPPED | OUTPATIENT
Start: 2020-12-02

## 2020-12-15 ENCOUNTER — HOSPITAL ENCOUNTER (EMERGENCY)
Facility: HOSPITAL | Age: 30
Discharge: HOME | End: 2020-12-15
Attending: EMERGENCY MEDICINE | Admitting: EMERGENCY MEDICINE
Payer: COMMERCIAL

## 2020-12-15 VITALS
HEART RATE: 99 BPM | SYSTOLIC BLOOD PRESSURE: 150 MMHG | WEIGHT: 133 LBS | OXYGEN SATURATION: 99 % | BODY MASS INDEX: 24.33 KG/M2 | RESPIRATION RATE: 16 BRPM | DIASTOLIC BLOOD PRESSURE: 90 MMHG | TEMPERATURE: 98 F

## 2020-12-15 DIAGNOSIS — Z59.00 HOMELESSNESS: Primary | ICD-10-CM

## 2020-12-15 PROCEDURE — 99283 EMERGENCY DEPT VISIT LOW MDM: CPT

## 2020-12-15 PROCEDURE — 99281 EMR DPT VST MAYX REQ PHY/QHP: CPT | Performed by: EMERGENCY MEDICINE

## 2020-12-15 SDOH — ECONOMIC STABILITY - HOUSING INSECURITY: HOMELESSNESS UNSPECIFIED: Z59.00

## 2020-12-30 ENCOUNTER — TRANSCRIBE ORDERS (OUTPATIENT)
Dept: LAB | Facility: HOSPITAL | Age: 30
End: 2020-12-30

## 2021-01-13 ENCOUNTER — LAB (OUTPATIENT)
Dept: LAB | Facility: HOSPITAL | Age: 31
End: 2021-01-13
Payer: COMMERCIAL

## 2021-01-13 ENCOUNTER — TRANSCRIBE ORDERS (OUTPATIENT)
Dept: LAB | Facility: HOSPITAL | Age: 31
End: 2021-01-13

## 2021-01-13 DIAGNOSIS — Z00.00 ROUTINE GENERAL MEDICAL EXAMINATION AT A HEALTH CARE FACILITY: Primary | ICD-10-CM

## 2021-01-13 DIAGNOSIS — Z00.00 ROUTINE GENERAL MEDICAL EXAMINATION AT A HEALTH CARE FACILITY: ICD-10-CM

## 2021-01-13 LAB
ALBUMIN SERPL BCP-MCNC: 4.6 G/DL (ref 3–5.2)
ALP SERPL-CCNC: 71 U/L (ref 43–122)
ALT SERPL W P-5'-P-CCNC: 20 U/L (ref 9–52)
ANION GAP SERPL CALCULATED.3IONS-SCNC: 9 MMOL/L (ref 5–14)
AST SERPL W P-5'-P-CCNC: 35 U/L (ref 17–59)
BILIRUB SERPL-MCNC: 0.8 MG/DL
BUN SERPL-MCNC: 15 MG/DL (ref 5–25)
CALCIUM SERPL-MCNC: 10.4 MG/DL (ref 8.4–10.2)
CHLORIDE SERPL-SCNC: 98 MMOL/L (ref 97–108)
CHOLEST SERPL-MCNC: 157 MG/DL
CO2 SERPL-SCNC: 35 MMOL/L (ref 22–30)
CREAT SERPL-MCNC: 1.06 MG/DL (ref 0.7–1.5)
ERYTHROCYTE [DISTWIDTH] IN BLOOD BY AUTOMATED COUNT: 12.7 %
GFR SERPL CREATININE-BSD FRML MDRD: 108 ML/MIN/1.73SQ M
GLUCOSE P FAST SERPL-MCNC: 91 MG/DL (ref 70–99)
HCT VFR BLD AUTO: 49.5 % (ref 41–53)
HDLC SERPL-MCNC: 52 MG/DL
HGB BLD-MCNC: 17.1 G/DL (ref 13.5–17.5)
LDLC SERPL CALC-MCNC: 88 MG/DL
LG PLATELETS BLD QL SMEAR: PRESENT
LYMPHOCYTES # BLD AUTO: 2.15 THOUSAND/UL (ref 0.5–4)
LYMPHOCYTES # BLD AUTO: 58 % (ref 25–45)
MCH RBC QN AUTO: 32 PG (ref 26–34)
MCHC RBC AUTO-ENTMCNC: 34.5 G/DL (ref 31–36)
MCV RBC AUTO: 93 FL (ref 80–100)
MONOCYTES # BLD AUTO: 0.85 THOUSAND/UL (ref 0.2–0.9)
MONOCYTES NFR BLD AUTO: 23 % (ref 1–10)
NEUTS SEG # BLD: 0.7 THOUSAND/UL (ref 1.8–7.8)
NEUTS SEG NFR BLD AUTO: 19 %
NONHDLC SERPL-MCNC: 105 MG/DL
PLATELET # BLD AUTO: 202 THOUSANDS/UL (ref 150–450)
PLATELET BLD QL SMEAR: ADEQUATE
PMV BLD AUTO: 8.8 FL (ref 8.9–12.7)
POTASSIUM SERPL-SCNC: 4.8 MMOL/L (ref 3.6–5)
PROT SERPL-MCNC: 8.1 G/DL (ref 5.9–8.4)
RBC # BLD AUTO: 5.33 MILLION/UL (ref 4.5–5.9)
RBC MORPH BLD: NORMAL
SODIUM SERPL-SCNC: 142 MMOL/L (ref 137–147)
TOTAL CELLS COUNTED SPEC: 100
TRIGL SERPL-MCNC: 84 MG/DL
TSH SERPL DL<=0.05 MIU/L-ACNC: 2.95 UIU/ML (ref 0.47–4.68)
WBC # BLD AUTO: 3.7 THOUSAND/UL (ref 4.5–11)

## 2021-01-13 PROCEDURE — 80053 COMPREHEN METABOLIC PANEL: CPT

## 2021-01-13 PROCEDURE — 85027 COMPLETE CBC AUTOMATED: CPT

## 2021-01-13 PROCEDURE — 84443 ASSAY THYROID STIM HORMONE: CPT

## 2021-01-13 PROCEDURE — 36415 COLL VENOUS BLD VENIPUNCTURE: CPT

## 2021-01-13 PROCEDURE — 80307 DRUG TEST PRSMV CHEM ANLYZR: CPT

## 2021-01-13 PROCEDURE — 80061 LIPID PANEL: CPT

## 2021-01-13 PROCEDURE — 86480 TB TEST CELL IMMUN MEASURE: CPT

## 2021-01-13 PROCEDURE — 85007 BL SMEAR W/DIFF WBC COUNT: CPT

## 2021-01-14 LAB
AMPHETAMINES UR QL SCN: NEGATIVE NG/ML
BARBITURATES UR QL SCN: NEGATIVE NG/ML
BENZODIAZ UR QL: NEGATIVE NG/ML
BZE UR QL: NEGATIVE NG/ML
CANNABINOIDS UR QL SCN: NEGATIVE NG/ML
GAMMA INTERFERON BACKGROUND BLD IA-ACNC: 0.16 IU/ML
M TB IFN-G BLD-IMP: NEGATIVE
M TB IFN-G CD4+ BCKGRND COR BLD-ACNC: 0.13 IU/ML
M TB IFN-G CD4+ BCKGRND COR BLD-ACNC: 0.2 IU/ML
METHADONE UR QL SCN: NEGATIVE NG/ML
MITOGEN IGNF BCKGRD COR BLD-ACNC: >10 IU/ML
OPIATES UR QL: NEGATIVE NG/ML
PCP UR QL: NEGATIVE NG/ML
PROPOXYPH UR QL SCN: NEGATIVE NG/ML

## 2021-01-27 ENCOUNTER — HOSPITAL ENCOUNTER (EMERGENCY)
Facility: HOSPITAL | Age: 31
Discharge: HOME | End: 2021-01-27
Attending: EMERGENCY MEDICINE | Admitting: EMERGENCY MEDICINE
Payer: COMMERCIAL

## 2021-01-27 VITALS
DIASTOLIC BLOOD PRESSURE: 90 MMHG | TEMPERATURE: 96.9 F | HEART RATE: 103 BPM | OXYGEN SATURATION: 99 % | BODY MASS INDEX: 27.07 KG/M2 | RESPIRATION RATE: 17 BRPM | WEIGHT: 148 LBS | SYSTOLIC BLOOD PRESSURE: 146 MMHG

## 2021-01-27 DIAGNOSIS — Z76.0 ENCOUNTER FOR MEDICATION REFILL: Primary | ICD-10-CM

## 2021-01-27 PROCEDURE — 99283 EMERGENCY DEPT VISIT LOW MDM: CPT | Performed by: PHYSICIAN ASSISTANT

## 2021-01-27 PROCEDURE — 99282 EMERGENCY DEPT VISIT SF MDM: CPT

## 2021-01-27 RX ORDER — ALBUTEROL SULFATE 90 UG/1
2 AEROSOL, METERED RESPIRATORY (INHALATION) ONCE
Status: COMPLETED | OUTPATIENT
Start: 2021-01-27 | End: 2021-01-27

## 2021-01-27 RX ADMIN — ALBUTEROL SULFATE 2 PUFF: 90 AEROSOL, METERED RESPIRATORY (INHALATION) at 09:32

## 2021-01-27 NOTE — ED PROVIDER NOTES
History  Chief Complaint   Patient presents with    Medication Refill     65-year-old male presents today requesting medication refill patient reports he for got his albuterol inhaler and would like a refill of this medication  Patient denies any difficulty breathing or any symptoms at this time  Patient arrives with EMS who report the patient's mother is typically contacted on the patient arrives to the ER  EMS reports they are familiar with this patient and state he does have developmental delays and lives in a group home  History provided by:  Patient  Medication Refill  Reason for refill: forgot albuterol  Prior to Admission Medications   Prescriptions Last Dose Informant Patient Reported? Taking? Melatonin ER 10 MG TBCR   Yes No   Sig: Take 10 mg by mouth   QUEtiapine (SEROquel) 400 MG tablet   Yes No   Sig: Take 400 mg by mouth daily at bedtime   albuterol (PROVENTIL HFA,VENTOLIN HFA) 90 mcg/act inhaler   No No   Sig: Inhale 1-2 puffs every 6 (six) hours as needed for wheezing   cloNIDine (CATAPRES) 0 1 mg tablet   Yes No   Sig: Take 0 1 mg by mouth    cloNIDine (CATAPRES) 0 1 mg tablet   Yes No   Sig: Take by mouth   divalproex sodium (DEPAKOTE ER) 250 mg 24 hr tablet   Yes No   Sig: Take 250 mg by mouth  divalproex sodium (DEPAKOTE ER) 500 mg 24 hr tablet   Yes No   Sig: Take by mouth   traZODone (DESYREL) 100 mg tablet   Yes No   Sig: Take 100 mg by mouth      Facility-Administered Medications: None       Past Medical History:   Diagnosis Date    Asthma     Autism spectrum disorder     Bipolar 1 disorder (HCC)     Cognitive impairment     Depression     Intellectual functioning disability     Intestinal disaccharidase deficiency     Mood swings     Psychiatric illness     [facility]izo[facility]affective schizophrenia ([facility] [address] )     Self-injurious behavior        Past Surgical History:   Procedure Laterality Date    FRACTURE SURGERY      SHOULDER SURGERY         History reviewed   No pertinent family history  I have reviewed and agree with the history as documented  E-Cigarette/Vaping    E-Cigarette Use Never User      E-Cigarette/Vaping Substances     Social History     Tobacco Use    Smoking status: Former Smoker     Packs/day: 0 25     Types: Cigarettes    Smokeless tobacco: Never Used   Substance Use Topics    Alcohol use: No    Drug use: Not Currently     Types: "Crack" cocaine, Cocaine       Review of Systems    Physical Exam  Physical Exam  Vitals signs and nursing note reviewed  Constitutional:       Appearance: Normal appearance  He is well-developed  HENT:      Head: Normocephalic and atraumatic  Eyes:      General: No scleral icterus  Pupils: Pupils are equal, round, and reactive to light  Neck:      Musculoskeletal: Normal range of motion  Cardiovascular:      Rate and Rhythm: Normal rate and regular rhythm  Pulses: Normal pulses  Pulmonary:      Effort: Pulmonary effort is normal  No respiratory distress  Breath sounds: No stridor  Comments:  Patient in no respiratory distress, speaking in full sentences, managing oral secretions without difficulty, no accessory muscle use, retractions, or belly breathing noted, no adventitious lung sounds auscultated bilaterally  Abdominal:      General: There is no distension  Palpations: There is no mass  Skin:     General: Skin is warm and dry  Capillary Refill: Capillary refill takes less than 2 seconds  Coloration: Skin is not jaundiced  Neurological:      Mental Status: He is alert and oriented to person, place, and time        Gait: Gait normal    Psychiatric:         Mood and Affect: Mood normal          Vital Signs  ED Triage Vitals   Temperature Pulse Respirations Blood Pressure SpO2   01/27/21 0926 01/27/21 0926 01/27/21 0926 01/27/21 0929 01/27/21 0926   (!) 96 9 °F (36 1 °C) (!) 116 18 141/94 99 %      Temp Source Heart Rate Source Patient Position - Orthostatic VS BP Location FiO2 (%) 01/27/21 0926 01/27/21 0926 01/27/21 0926 01/27/21 0926 --   Tympanic Monitor Sitting Left arm       Pain Score       01/27/21 0926       No Pain           Vitals:    01/27/21 0926 01/27/21 0929   BP:  141/94   Pulse: (!) 116    Patient Position - Orthostatic VS: Sitting Sitting         Visual Acuity      ED Medications  Medications   albuterol (PROVENTIL HFA,VENTOLIN HFA) inhaler 2 puff (has no administration in time range)       Diagnostic Studies  Results Reviewed     None                 No orders to display              Procedures  Procedures         ED Course                                           MDM  Number of Diagnoses or Management Options  Encounter for medication refill:   Diagnosis management comments: Strict return to ED precautions discussed  Patient recommended to follow up promptly with appropriate outpatient provider  Patient and/or family members verbalizes understanding and agrees with plan  Patient is stable for discharge      Portions of the record may have been created with voice recognition software  Occasional wrong word or "sound a like" substitutions may have occurred due to the inherent limitations of voice recognition software  Read the chart carefully and recognize, using context, where substitutions have occurred  Disposition  Final diagnoses:   Encounter for medication refill     Time reflects when diagnosis was documented in both MDM as applicable and the Disposition within this note     Time User Action Codes Description Comment    1/27/2021  9:25 AM Reece Denise Add [Z76 0] Encounter for medication refill       ED Disposition     ED Disposition Condition Date/Time Comment    Discharge Stable Wed Jan 27, 2021  9:25 AM Brian Ramesh discharge to home/self care              Follow-up Information     Follow up With Specialties Details Why Contact Info    BRANDEE Cole Nurse Practitioner Schedule an appointment as soon as possible for a visit in 2 days As needed [address] [facility] [address]  [phone]            Patient's Medications   Discharge Prescriptions    No medications on file     No discharge procedures on file      PDMP Review     None          ED Provider  Electronically Signed by           Christin Saenz PA-C  01/27/21 8010

## 2021-03-08 ENCOUNTER — HOSPITAL ENCOUNTER (EMERGENCY)
Facility: HOSPITAL | Age: 31
Discharge: HOME | End: 2021-03-08
Attending: EMERGENCY MEDICINE
Payer: COMMERCIAL

## 2021-03-08 VITALS
SYSTOLIC BLOOD PRESSURE: 134 MMHG | DIASTOLIC BLOOD PRESSURE: 79 MMHG | TEMPERATURE: 98.3 F | RESPIRATION RATE: 18 BRPM | BODY MASS INDEX: 31.17 KG/M2 | HEART RATE: 105 BPM | WEIGHT: 170.42 LBS | OXYGEN SATURATION: 98 %

## 2021-03-08 DIAGNOSIS — J06.9 VIRAL URI: ICD-10-CM

## 2021-03-08 DIAGNOSIS — M54.50 LOW BACK PAIN: Primary | ICD-10-CM

## 2021-03-08 LAB
BILIRUB UR QL STRIP: NEGATIVE
CLARITY UR: CLEAR
COLOR UR: YELLOW
GLUCOSE UR STRIP-MCNC: NEGATIVE MG/DL
HGB UR QL STRIP.AUTO: NEGATIVE
KETONES UR STRIP-MCNC: NEGATIVE MG/DL
LEUKOCYTE ESTERASE UR QL STRIP: NEGATIVE
NITRITE UR QL STRIP: NEGATIVE
PH UR STRIP.AUTO: 7 [PH] (ref 4.5–8)
PROT UR STRIP-MCNC: NEGATIVE MG/DL
SP GR UR STRIP.AUTO: >=1.03 (ref 1–1.03)
UROBILINOGEN UR QL STRIP.AUTO: 0.2 E.U./DL

## 2021-03-08 PROCEDURE — 81003 URINALYSIS AUTO W/O SCOPE: CPT

## 2021-03-08 PROCEDURE — 99283 EMERGENCY DEPT VISIT LOW MDM: CPT | Performed by: EMERGENCY MEDICINE

## 2021-03-08 PROCEDURE — U0005 INFEC AGEN DETEC AMPLI PROBE: HCPCS | Performed by: EMERGENCY MEDICINE

## 2021-03-08 PROCEDURE — U0003 INFECTIOUS AGENT DETECTION BY NUCLEIC ACID (DNA OR RNA); SEVERE ACUTE RESPIRATORY SYNDROME CORONAVIRUS 2 (SARS-COV-2) (CORONAVIRUS DISEASE [COVID-19]), AMPLIFIED PROBE TECHNIQUE, MAKING USE OF HIGH THROUGHPUT TECHNOLOGIES AS DESCRIBED BY CMS-2020-01-R: HCPCS | Performed by: EMERGENCY MEDICINE

## 2021-03-08 PROCEDURE — 99284 EMERGENCY DEPT VISIT MOD MDM: CPT

## 2021-03-08 RX ORDER — IBUPROFEN 600 MG/1
600 TABLET ORAL EVERY 6 HOURS PRN
Qty: 20 TABLET | Refills: 0 | Status: SHIPPED | OUTPATIENT
Start: 2021-03-08 | End: 2021-03-19

## 2021-03-08 RX ORDER — IBUPROFEN 600 MG/1
600 TABLET ORAL ONCE
Status: COMPLETED | OUTPATIENT
Start: 2021-03-08 | End: 2021-03-08

## 2021-03-08 RX ORDER — CYCLOBENZAPRINE HCL 10 MG
10 TABLET ORAL 2 TIMES DAILY PRN
Qty: 20 TABLET | Refills: 0 | Status: SHIPPED | OUTPATIENT
Start: 2021-03-08 | End: 2021-03-19

## 2021-03-08 RX ADMIN — IBUPROFEN 600 MG: 600 TABLET ORAL at 12:42

## 2021-03-08 NOTE — ED PROVIDER NOTES
History  Chief Complaint   Patient presents with    Flank Pain     pt reports left sided flank pain x 1 week denies urinary issues    Flu Symptoms     pt is stuffy denies cough and fevers at home     C/o L lower back pain for about 1 week, no injury  No blood in urine, no dysuria, no fevers, no n/v/d    Pt  Never had a kidney stone before    He also has some "stuffy nose"          Prior to Admission Medications   Prescriptions Last Dose Informant Patient Reported? Taking? Melatonin ER 10 MG TBCR   Yes No   Sig: Take 10 mg by mouth   QUEtiapine (SEROquel) 400 MG tablet   Yes No   Sig: Take 400 mg by mouth daily at bedtime   albuterol (PROVENTIL HFA,VENTOLIN HFA) 90 mcg/act inhaler   No No   Sig: Inhale 1-2 puffs every 6 (six) hours as needed for wheezing   cloNIDine (CATAPRES) 0 1 mg tablet   Yes No   Sig: Take 0 1 mg by mouth    cloNIDine (CATAPRES) 0 1 mg tablet   Yes No   Sig: Take by mouth   divalproex sodium (DEPAKOTE ER) 250 mg 24 hr tablet   Yes No   Sig: Take 250 mg by mouth  divalproex sodium (DEPAKOTE ER) 500 mg 24 hr tablet   Yes No   Sig: Take by mouth   traZODone (DESYREL) 100 mg tablet   Yes No   Sig: Take 100 mg by mouth      Facility-Administered Medications: None       Past Medical History:   Diagnosis Date    Asthma     Autism spectrum disorder     Bipolar 1 disorder (HCC)     Cognitive impairment     Depression     Intellectual functioning disability     Intestinal disaccharidase deficiency     Mood swings     Psychiatric illness     Schizo-affective schizophrenia ([facility] [address] )     Self-injurious behavior        Past Surgical History:   Procedure Laterality Date    FRACTURE SURGERY      SHOULDER SURGERY         History reviewed  No pertinent family history  I have reviewed and agree with the history as documented      E-Cigarette/Vaping    E-Cigarette Use Never User      E-Cigarette/Vaping Substances     Social History     Tobacco Use    Smoking status: Current Every Day Smoker Packs/day: 0 25     Types: Cigarettes    Smokeless tobacco: Never Used   Substance Use Topics    Alcohol use: No    Drug use: Not Currently     Types: "Crack" cocaine, Cocaine       Review of Systems   Constitutional: Negative for appetite change, fatigue and fever  HENT: Positive for congestion and rhinorrhea  Negative for sore throat  Respiratory: Negative for cough, shortness of breath and wheezing  Cardiovascular: Negative for chest pain and leg swelling  Gastrointestinal: Negative for abdominal pain, diarrhea and vomiting  Genitourinary: Negative for dysuria and flank pain  Musculoskeletal: Positive for back pain  Negative for neck pain  Skin: Negative for rash  Neurological: Negative for syncope and headaches  Psychiatric/Behavioral:        Mood normal       Physical Exam  Physical Exam  Vitals signs and nursing note reviewed  Constitutional:       Appearance: He is well-developed  HENT:      Head: Normocephalic and atraumatic  Neck:      Musculoskeletal: Normal range of motion and neck supple  Cardiovascular:      Rate and Rhythm: Normal rate and regular rhythm  Pulmonary:      Effort: Pulmonary effort is normal  No respiratory distress  Breath sounds: Normal breath sounds  Abdominal:      Palpations: Abdomen is soft  Tenderness: There is no abdominal tenderness  Musculoskeletal:      Comments: No spinal tenderness, + L lower lumbar tenderness, mild CVA tenderness   Skin:     General: Skin is warm and dry  Neurological:      Mental Status: He is alert and oriented to person, place, and time           Vital Signs  ED Triage Vitals [03/08/21 1215]   Temperature Pulse Respirations Blood Pressure SpO2   98 3 °F (36 8 °C) 105 18 134/79 98 %      Temp Source Heart Rate Source Patient Position - Orthostatic VS BP Location FiO2 (%)   Oral Monitor Lying Right arm --      Pain Score       3           Vitals:    03/08/21 1215   BP: 134/79   Pulse: 105   Patient Position - Orthostatic VS: Lying         Visual Acuity      ED Medications  Medications   ibuprofen (MOTRIN) tablet 600 mg (600 mg Oral Given 3/8/21 1242)       Diagnostic Studies  Results Reviewed     Procedure Component Value Units Date/Time    Novel Coronavirus Neil Ledbetter [[id number]] Collected: 03/08/21 1243    Lab Status: In process Specimen: Nares from Nose Updated: 03/08/21 1251    Urine Macroscopic, POC [[id number]] Collected: 03/08/21 1248    Lab Status: Final result Specimen: Urine Updated: 03/08/21 1249     Color, UA Yellow     Clarity, UA Clear     pH, UA 7 0     Leukocytes, UA Negative     Nitrite, UA Negative     Protein, UA Negative mg/dl      Glucose, UA Negative mg/dl      Ketones, UA Negative mg/dl      Urobilinogen, UA 0 2 E U /dl      Bilirubin, UA Negative     Blood, UA Negative     Specific Gravity, UA >=1 030    Narrative:      CLINITEK RESULT                 No orders to display              Procedures  Procedures         ED Course                                           MDM  Number of Diagnoses or Management Options  Low back pain:   Viral URI:      Amount and/or Complexity of Data Reviewed  Clinical lab tests: ordered and reviewed  Tests in the radiology section of CPT®: ordered and reviewed    Risk of Complications, Morbidity, and/or Mortality  Presenting problems: moderate  General comments: Pt  Is stable to follow up as an outpt  Disposition  Final diagnoses:   Low back pain   Viral URI     Time reflects when diagnosis was documented in both MDM as applicable and the Disposition within this note     Time User Action Codes Description Comment    3/8/2021  1:43 PM Yomi GALEAS Add [M54 5] Low back pain     3/8/2021  1:43 PM Nehal Geronimo Add [J06 9] Viral URI       ED Disposition     ED Disposition Condition Date/Time Comment    Discharge Stable Mon Mar 8, 2021  1:43 PM Song Gastelum discharge to home/self care              Follow-up Information Follow up With Specialties Details Why Wicho 62, CRNP Nurse Practitioner   [address]  [phone]            Discharge Medication List as of 3/8/2021  1:45 PM      START taking these medications    Details   cyclobenzaprine (FLEXERIL) 10 mg tablet Take 1 tablet (10 mg total) by mouth 2 (two) times a day as needed for muscle spasms, Starting Mon 3/8/2021, Print      ibuprofen (MOTRIN) 600 mg tablet Take 1 tablet (600 mg total) by mouth every 6 (six) hours as needed for moderate pain, Starting Mon 3/8/2021, Print         CONTINUE these medications which have NOT CHANGED    Details   albuterol (PROVENTIL HFA,VENTOLIN HFA) 90 mcg/act inhaler Inhale 1-2 puffs every 6 (six) hours as needed for wheezing, Starting Wed 12/2/2020, Print      !! cloNIDine (CATAPRES) 0 1 mg tablet Take 0 1 mg by mouth , Until Discontinued, Historical Med      !! cloNIDine (CATAPRES) 0 1 mg tablet Take by mouth, Starting Wed 4/4/2012, Historical Med      !! divalproex sodium (DEPAKOTE ER) 250 mg 24 hr tablet Take 250 mg by mouth , Starting 7/25/2016, Until Discontinued, Historical Med      !! divalproex sodium (DEPAKOTE ER) 500 mg 24 hr tablet Take by mouth, Starting Wed 4/4/2012, Historical Med      Melatonin ER 10 MG TBCR Take 10 mg by mouth, Historical Med      QUEtiapine (SEROquel) 400 MG tablet Take 400 mg by mouth daily at bedtime, Historical Med      traZODone (DESYREL) 100 mg tablet Take 100 mg by mouth, Historical Med       !! - Potential duplicate medications found  Please discuss with provider  No discharge procedures on file      PDMP Review     None          ED Provider  Electronically Signed by           Lalitha Story MD  03/08/21 8477

## 2021-03-09 LAB — SARS-COV-2 RNA RESP QL NAA+PROBE: NEGATIVE

## 2021-03-19 ENCOUNTER — APPOINTMENT (EMERGENCY)
Dept: RADIOLOGY | Facility: HOSPITAL | Age: 31
End: 2021-03-19
Payer: COMMERCIAL

## 2021-03-19 ENCOUNTER — HOSPITAL ENCOUNTER (EMERGENCY)
Facility: HOSPITAL | Age: 31
End: 2021-03-20
Attending: EMERGENCY MEDICINE | Admitting: EMERGENCY MEDICINE
Payer: COMMERCIAL

## 2021-03-19 DIAGNOSIS — Z09 FOLLOW UP: ICD-10-CM

## 2021-03-19 DIAGNOSIS — R45.851 SUICIDAL IDEATIONS: Primary | ICD-10-CM

## 2021-03-19 LAB
AMPHETAMINES SERPL QL SCN: NEGATIVE
BARBITURATES UR QL: NEGATIVE
BENZODIAZ UR QL: NEGATIVE
COCAINE UR QL: NEGATIVE
ETHANOL EXG-MCNC: 0 MG/DL
FLUAV RNA RESP QL NAA+PROBE: NEGATIVE
FLUBV RNA RESP QL NAA+PROBE: NEGATIVE
METHADONE UR QL: NEGATIVE
OPIATES UR QL SCN: NEGATIVE
OXYCODONE+OXYMORPHONE UR QL SCN: NEGATIVE
PCP UR QL: NEGATIVE
RSV RNA RESP QL NAA+PROBE: NEGATIVE
SARS-COV-2 RNA RESP QL NAA+PROBE: NEGATIVE
THC UR QL: NEGATIVE

## 2021-03-19 PROCEDURE — 73090 X-RAY EXAM OF FOREARM: CPT

## 2021-03-19 PROCEDURE — 99285 EMERGENCY DEPT VISIT HI MDM: CPT | Performed by: PHYSICIAN ASSISTANT

## 2021-03-19 PROCEDURE — 99285 EMERGENCY DEPT VISIT HI MDM: CPT

## 2021-03-19 PROCEDURE — 82075 ASSAY OF BREATH ETHANOL: CPT

## 2021-03-19 PROCEDURE — 80307 DRUG TEST PRSMV CHEM ANLYZR: CPT

## 2021-03-19 PROCEDURE — 0241U HB NFCT DS VIR RESP RNA 4 TRGT: CPT | Performed by: PHYSICIAN ASSISTANT

## 2021-03-19 RX ORDER — BENZTROPINE MESYLATE 1 MG/ML
0.5 INJECTION INTRAMUSCULAR; INTRAVENOUS
Status: CANCELLED | OUTPATIENT
Start: 2021-03-19

## 2021-03-19 RX ORDER — CLONIDINE HYDROCHLORIDE 0.1 MG/1
0.1 TABLET ORAL
Status: DISCONTINUED | OUTPATIENT
Start: 2021-03-19 | End: 2021-03-20 | Stop reason: HOSPADM

## 2021-03-19 RX ORDER — LORAZEPAM 2 MG/ML
1 INJECTION INTRAMUSCULAR
Status: CANCELLED | OUTPATIENT
Start: 2021-03-19

## 2021-03-19 RX ORDER — QUETIAPINE FUMARATE 200 MG/1
400 TABLET, FILM COATED ORAL 2 TIMES DAILY
Status: DISCONTINUED | OUTPATIENT
Start: 2021-03-19 | End: 2021-03-20 | Stop reason: HOSPADM

## 2021-03-19 RX ORDER — BENZTROPINE MESYLATE 1 MG/1
1 TABLET ORAL
Status: CANCELLED | OUTPATIENT
Start: 2021-03-19

## 2021-03-19 RX ORDER — RISPERIDONE 0.5 MG/1
0.5 TABLET, ORALLY DISINTEGRATING ORAL
Status: CANCELLED | OUTPATIENT
Start: 2021-03-19

## 2021-03-19 RX ORDER — BENZTROPINE MESYLATE 1 MG/ML
1 INJECTION INTRAMUSCULAR; INTRAVENOUS
Status: CANCELLED | OUTPATIENT
Start: 2021-03-19

## 2021-03-19 RX ORDER — AMOXICILLIN 250 MG
1 CAPSULE ORAL DAILY PRN
Status: CANCELLED | OUTPATIENT
Start: 2021-03-19

## 2021-03-19 RX ORDER — RISPERIDONE 1 MG/1
1 TABLET, ORALLY DISINTEGRATING ORAL
Status: CANCELLED | OUTPATIENT
Start: 2021-03-19

## 2021-03-19 RX ORDER — LANOLIN ALCOHOL/MO/W.PET/CERES
3 CREAM (GRAM) TOPICAL
Status: CANCELLED | OUTPATIENT
Start: 2021-03-19

## 2021-03-19 RX ORDER — HALOPERIDOL 5 MG/ML
5 INJECTION INTRAMUSCULAR
Status: CANCELLED | OUTPATIENT
Start: 2021-03-19

## 2021-03-19 RX ORDER — LORAZEPAM 1 MG/1
1 TABLET ORAL
Status: CANCELLED | OUTPATIENT
Start: 2021-03-19

## 2021-03-19 RX ORDER — RISPERIDONE 0.25 MG/1
0.25 TABLET, ORALLY DISINTEGRATING ORAL
Status: CANCELLED | OUTPATIENT
Start: 2021-03-19

## 2021-03-19 RX ORDER — LORAZEPAM 1 MG/1
1 TABLET ORAL ONCE
Status: COMPLETED | OUTPATIENT
Start: 2021-03-19 | End: 2021-03-19

## 2021-03-19 RX ORDER — BISACODYL 10 MG
10 SUPPOSITORY, RECTAL RECTAL DAILY PRN
Status: CANCELLED | OUTPATIENT
Start: 2021-03-19

## 2021-03-19 RX ORDER — LORAZEPAM 2 MG/ML
2 INJECTION INTRAMUSCULAR
Status: CANCELLED | OUTPATIENT
Start: 2021-03-19

## 2021-03-19 RX ORDER — HYDROXYZINE HYDROCHLORIDE 25 MG/1
50 TABLET, FILM COATED ORAL
Status: CANCELLED | OUTPATIENT
Start: 2021-03-19

## 2021-03-19 RX ORDER — POLYETHYLENE GLYCOL 3350 17 G/17G
17 POWDER, FOR SOLUTION ORAL DAILY PRN
Status: CANCELLED | OUTPATIENT
Start: 2021-03-19

## 2021-03-19 RX ORDER — HALOPERIDOL 5 MG/ML
2.5 INJECTION INTRAMUSCULAR
Status: CANCELLED | OUTPATIENT
Start: 2021-03-19

## 2021-03-19 RX ORDER — MINERAL OIL AND PETROLATUM 150; 830 MG/G; MG/G
1 OINTMENT OPHTHALMIC
Status: CANCELLED | OUTPATIENT
Start: 2021-03-19

## 2021-03-19 RX ORDER — IBUPROFEN 400 MG/1
400 TABLET ORAL EVERY 6 HOURS PRN
Status: CANCELLED | OUTPATIENT
Start: 2021-03-19

## 2021-03-19 RX ORDER — LORAZEPAM 2 MG/ML
1 INJECTION INTRAMUSCULAR EVERY 4 HOURS PRN
Status: CANCELLED | OUTPATIENT
Start: 2021-03-19

## 2021-03-19 RX ORDER — LORAZEPAM 0.5 MG/1
0.5 TABLET ORAL EVERY 6 HOURS PRN
Status: CANCELLED | OUTPATIENT
Start: 2021-03-19

## 2021-03-19 RX ORDER — HYDROXYZINE HYDROCHLORIDE 25 MG/1
25 TABLET, FILM COATED ORAL
Status: CANCELLED | OUTPATIENT
Start: 2021-03-19

## 2021-03-19 RX ORDER — ACETAMINOPHEN 325 MG/1
650 TABLET ORAL EVERY 6 HOURS PRN
Status: CANCELLED | OUTPATIENT
Start: 2021-03-19

## 2021-03-19 RX ORDER — LANOLIN ALCOHOL/MO/W.PET/CERES
6 CREAM (GRAM) TOPICAL
Status: DISCONTINUED | OUTPATIENT
Start: 2021-03-19 | End: 2021-03-20 | Stop reason: HOSPADM

## 2021-03-19 RX ORDER — IBUPROFEN 600 MG/1
600 TABLET ORAL EVERY 8 HOURS PRN
Status: CANCELLED | OUTPATIENT
Start: 2021-03-19

## 2021-03-19 RX ORDER — MAGNESIUM HYDROXIDE/ALUMINUM HYDROXICE/SIMETHICONE 120; 1200; 1200 MG/30ML; MG/30ML; MG/30ML
30 SUSPENSION ORAL EVERY 4 HOURS PRN
Status: CANCELLED | OUTPATIENT
Start: 2021-03-19

## 2021-03-19 RX ORDER — DIVALPROEX SODIUM 500 MG/1
500 TABLET, EXTENDED RELEASE ORAL 2 TIMES DAILY
Status: DISCONTINUED | OUTPATIENT
Start: 2021-03-19 | End: 2021-03-20 | Stop reason: HOSPADM

## 2021-03-19 RX ADMIN — MELATONIN 6 MG: at 22:40

## 2021-03-19 RX ADMIN — CLONIDINE HYDROCHLORIDE 0.1 MG: 0.1 TABLET ORAL at 22:37

## 2021-03-19 RX ADMIN — LORAZEPAM 1 MG: 1 TABLET ORAL at 21:46

## 2021-03-19 RX ADMIN — LORAZEPAM 1 MG: 1 TABLET ORAL at 18:11

## 2021-03-19 RX ADMIN — DIVALPROEX SODIUM 500 MG: 500 TABLET, FILM COATED, EXTENDED RELEASE ORAL at 22:40

## 2021-03-19 RX ADMIN — QUETIAPINE FUMARATE 400 MG: 200 TABLET ORAL at 22:37

## 2021-03-19 NOTE — ED NOTES
Crisis at bedside explaining to pt what a 201 is   Pt at first not agreeable to stay but after brief conversation, pt is agreeable to [facility] [address]  03/19/21 9654

## 2021-03-19 NOTE — LETTER
[facility]  [address] [id number]  Dept: [phone]      EMTALA TRANSFER CONSENT    NAME Godwin Paul                                         1990                              MRN [id number]    I have been informed of my rights regarding examination, treatment, and transfer   by Dr Min Vaughn DO    Benefits: Continuity of care    Risks: Other: (Include comment)__________________________(Behavioral Health Patient)      Transfer Request   I acknowledge that my medical condition has been evaluated and explained to me by the emergency department physician or other qualified medical person and/or my attending physician who has recommended and offered to me further medical examination and treatment  I understand the Hospital's obligation with respect to the treatment and stabilization of my emergency medical condition  I nevertheless request to be transferred  I release the Hospital, the doctor, and any other persons caring for me from all responsibility or liability for any injury or ill effects that may result from my transfer and agree to accept all responsibility for the consequences of my choice to transfer, rather than receive stabilizing treatment at the Hospital  I understand that because the transfer is my request, my insurance may not provide reimbursement for the services  The Hospital will assist and direct me and my family in how to make arrangements for transfer, but the hospital is not liable for any fees charged by the transport service  In spite of this understanding, I refuse to consent to further medical examination and treatment which has been offered to me, and request transfer to  [address] [place], [place]  I authorize the performance of emergency medical procedures and treatments upon me in both transit and upon arrival at the receiving facility    Additionally, I authorize the release of any and all medical records to the receiving facility and request they be transported with me, if possible  I authorize the performance of emergency medical procedures and treatments upon me in both transit and upon arrival at the receiving facility  Additionally, I authorize the release of any and all medical records to the receiving facility and request they be transported with me, if possible  I understand that the safest mode of transportation during a medical emergency is an ambulance and that the Hospital advocates the use of this mode of transport  Risks of traveling to the receiving facility by car, including absence of medical control, life sustaining equipment, such as oxygen, and medical personnel has been explained to me and I fully understand them  (KEANU CORRECT BOX BELOW)  [  ]  I consent to the stated transfer and to be transported by ambulance/helicopter  [  ]  I consent to the stated transfer, but refuse transportation by ambulance and accept full responsibility for my transportation by car  I understand the risks of non-ambulance transfers and I exonerate the Hospital and its staff from any deterioration in my condition that results from this refusal     X___________________________________________    DATE  21  TIME________  Signature of patient or legally responsible individual signing on patient behalf           RELATIONSHIP TO PATIENT_________________________          Provider Certification    NAME Brisa Cagle                                         1990                              MRN [id number]    A medical screening exam was performed on the above named patient  Based on the examination:    Condition Necessitating Transfer The primary encounter diagnosis was Suicidal ideations  A diagnosis of Follow up was also pertinent to this visit      Patient Condition: The patient has been stabilized such that within reasonable medical probability, no material deterioration of the patient condition or the condition of the unborn child(benita) is likely to result from the transfer, No noted underlying medical condition requiring transfer to another facility  Transfer is per preference and request of patient and/or family    Reason for Transfer: Level of Care needed not available at this facility, No bed available at level of patient's needs    Transfer Requirements: [address]   · Space available and qualified personnel available for treatment as acknowledged by Yuliya Bowser [phone]  · Agreed to accept transfer and to provide appropriate medical treatment as acknowledged by       Dr Yun March  · Appropriate medical records of the examination and treatment of the patient are provided at the time of transfer   [address] [facility], [address] _______  · Transfer will be performed by qualified personnel from [facility]  and appropriate transfer equipment as required, including the use of necessary and appropriate life support measures      Provider Certification: I have examined the patient and explained the following risks and benefits of being transferred/refusing transfer to the patient/family:  The patient is stable for psychiatric transfer because they are medically stable, and is protected from harming him/herself or others during transport, General risk, such as traffic hazards, adverse weather conditions, rough terrain or turbulence, possible failure of equipment (including vehicle or aircraft), or consequences of actions of persons outside the control of the transport personnel      Based on these reasonable risks and benefits to the patient and/or the unborn child(benita), and based upon the information available at the time of the patients examination, I certify that the medical benefits reasonably to be expected from the provision of appropriate medical treatments at another medical facility outweigh the increasing risks, if any, to the individuals medical condition, and in the case of labor to the unborn child, from effecting the transfer      X____________________________________________ DATE 03/19/21        TIME_______      ORIGINAL - SEND TO MEDICAL RECORDS   COPY - SEND WITH PATIENT DURING TRANSFER

## 2021-03-19 NOTE — ED NOTES
Attempted to call [facility] on [address] to obtain pts current medications  Per Pharmacy pt has not had medications filled there since 2019   Will attempt to call pts mother for med list      Deepika Baum RN  03/19/21 9347

## 2021-03-19 NOTE — ED NOTES
Pt has one bag of belongings at bedside with 1-1 staff member consisting of gray clothing  Pt also has blue draw string back at 8-10 nurses station with pt label on it        Kady Warren RN  03/19/21 7924

## 2021-03-19 NOTE — ED PROVIDER NOTES
History  Chief Complaint   Patient presents with    Psychiatric Evaluation     Pt was pulled out of traffic by [employer]  Pt informed [employer] that he was frustrated and suicidal which is why he raninto traffice  Pt denies si to current RN stating " i knew it was wrong, I was just frustrated      33y  o male with PMH of asthma, autism, bipolar, depression and schizoaffective schizophrenia presents to the ER for psychiatric evaluation  Per [employer], patient was pulled out of traffic by them  The patient stated he wanted to die so he ran into traffic  Patient told [employer] and nursing staff that he was suicidal but denied it to me  When asked why he ran into traffic, patient states he was frustrated and just wanted to get away  He denies HI  He reports auditory hallucinations but denies visual hallucinations  He follows with a psychiatrist and therapist at [facility] twice a week  He reports taking his medications as prescribed  He has been admitted previously to a behavioral health unit  He denies drug or alcohol use but does admit to smoking cigarettes occasionally  He reports that he lives alone in an apartment by [employer]  He denies fever, chills, URI symptoms, chest pain, dyspnea, N/V/D, abdominal pain, weakness or paresthesias  History provided by:  Patient   used: No        Prior to Admission Medications   Prescriptions Last Dose Informant Patient Reported? Taking?    Melatonin ER 10 MG TBCR   Yes Yes   Sig: Take 10 mg by mouth   QUEtiapine (SEROquel) 400 MG tablet   Yes Yes   Sig: Take 400 mg by mouth 2 (two) times a day    albuterol (PROVENTIL HFA,VENTOLIN HFA) 90 mcg/act inhaler   No Yes   Sig: Inhale 1-2 puffs every 6 (six) hours as needed for wheezing   cloNIDine (CATAPRES) 0 1 mg tablet   Yes Yes   Sig: Take by mouth 2 (two) times a day    divalproex sodium (DEPAKOTE ER) 250 mg 24 hr tablet   Yes Yes   Sig: Take 250 mg by mouth daily at bedtime    divalproex sodium (DEPAKOTE ER) 500 mg 24 hr tablet Yes Yes   Sig: Take 500 mg by mouth 2 (two) times a day XR formula      Facility-Administered Medications: None       Past Medical History:   Diagnosis Date    Asthma     Autism spectrum disorder     Bipolar 1 disorder (HCC)     Cognitive impairment     Depression     Intellectual functioning disability     Intestinal disaccharidase deficiency     Mood swings     Psychiatric illness     Schizo-affective schizophrenia ([facility] [address] )     Self-injurious behavior        Past Surgical History:   Procedure Laterality Date    FRACTURE SURGERY      SHOULDER SURGERY         History reviewed  No pertinent family history  I have reviewed and agree with the history as documented  E-Cigarette/Vaping    E-Cigarette Use Never User      E-Cigarette/Vaping Substances     Social History     Tobacco Use    Smoking status: Current Every Day Smoker     Packs/day: 0 25     Types: Cigarettes    Smokeless tobacco: Never Used   Substance Use Topics    Alcohol use: No    Drug use: Not Currently     Types: "Crack" cocaine, Cocaine       Review of Systems   Constitutional: Negative for activity change, appetite change, chills and fever  HENT: Negative for congestion, drooling, ear discharge, ear pain, facial swelling, rhinorrhea and sore throat  Eyes: Negative for redness  Respiratory: Negative for cough and shortness of breath  Cardiovascular: Negative for chest pain  Gastrointestinal: Negative for abdominal pain, diarrhea, nausea and vomiting  Musculoskeletal: Negative for neck stiffness  Skin: Negative for rash  Allergic/Immunologic: Positive for food allergies  Neurological: Negative for weakness and numbness  Psychiatric/Behavioral: Positive for hallucinations and suicidal ideas  Physical Exam  Physical Exam  Vitals signs and nursing note reviewed  Constitutional:       General: He is not in acute distress  Appearance: He is not toxic-appearing     HENT:      Head: Normocephalic and atraumatic  Eyes:      Conjunctiva/sclera: Conjunctivae normal    Neck:      Musculoskeletal: Normal range of motion and neck supple  Trachea: No tracheal deviation  Cardiovascular:      Rate and Rhythm: Normal rate and regular rhythm  Heart sounds: Normal heart sounds, S1 normal and S2 normal  No murmur  No friction rub  No gallop  Pulmonary:      Effort: Pulmonary effort is normal  No respiratory distress  Breath sounds: Normal breath sounds  No decreased breath sounds, wheezing, rhonchi or rales  Chest:      Chest wall: No tenderness  Abdominal:      General: Bowel sounds are normal  There is no distension  Palpations: Abdomen is soft  Tenderness: There is no abdominal tenderness  There is no guarding or rebound  Skin:     General: Skin is warm and dry  Findings: No rash  Neurological:      Mental Status: He is alert  GCS: GCS eye subscore is 4  GCS verbal subscore is 5  GCS motor subscore is 6  Psychiatric:         Attention and Perception: Attention normal          Mood and Affect: Mood normal          Speech: Speech normal          Behavior: Behavior is cooperative  Thought Content: Thought content does not include homicidal ideation  Thought content does not include homicidal plan           Vital Signs  ED Triage Vitals   Temperature Pulse Respirations Blood Pressure SpO2   03/19/21 1908 03/19/21 1905 03/19/21 1905 03/19/21 1905 03/19/21 1905   98 4 °F (36 9 °C) 96 18 140/93 94 %      Temp Source Heart Rate Source Patient Position - Orthostatic VS BP Location FiO2 (%)   03/19/21 1908 03/19/21 1905 03/19/21 1905 03/19/21 1905 --   Oral Monitor Sitting Right arm       Pain Score       03/19/21 1905       No Pain           Vitals:    03/19/21 1905 03/19/21 2145 03/19/21 2146 03/19/21 2237   BP: 140/93 133/85 133/85 130/90   Pulse: 96 104 103    Patient Position - Orthostatic VS: Sitting  Lying          Visual Acuity      ED Medications  Medications cloNIDine (CATAPRES) tablet 0 1 mg (0 1 mg Oral Given 3/19/21 2237)   divalproex sodium (DEPAKOTE ER) 24 hr tablet 500 mg (500 mg Oral Given 3/19/21 2240)   melatonin tablet 6 mg (6 mg Oral Given 3/19/21 2240)   QUEtiapine (SEROquel) tablet 400 mg (400 mg Oral Given 3/19/21 2237)   LORazepam (ATIVAN) tablet 1 mg (1 mg Oral Given 3/19/21 1811)   LORazepam (ATIVAN) tablet 1 mg (1 mg Oral Given 3/19/21 2146)       Diagnostic Studies  Results Reviewed     Procedure Component Value Units Date/Time    COVID19, Influenza A/B, RSV PCR, [facility] [[id number]]  (Normal) Collected: 03/19/21 1636    Lab Status: Final result Specimen: Nasopharyngeal Swab Updated: 03/19/21 1717     SARS-CoV-2 Negative     INFLUENZA A PCR Negative     INFLUENZA B PCR Negative     RSV PCR Negative    Narrative: This test has been authorized by FDA under an EUA (Emergency Use Assay) for use by authorized laboratories  Clinical caution and judgement should be used with the interpretation of these results with consideration of the clinical impression and other laboratory testing  Testing reported as "Positive" or "Negative" has been proven to be accurate according to standard laboratory validation requirements  All testing is performed with control materials showing appropriate reactivity at standard intervals  Rapid drug screen, urine [[id number]]  (Normal) Collected: 03/19/21 1619    Lab Status: Final result Specimen: Urine, Other Updated: 03/19/21 1708     Amph/Meth UR Negative     Barbiturate Ur Negative     Benzodiazepine Urine Negative     Cocaine Urine Negative     Methadone Urine Negative     Opiate Urine Negative     PCP Ur Negative     THC Urine Negative     Oxycodone Urine Negative    Narrative:      FOR MEDICAL PURPOSES ONLY  IF CONFIRMATION NEEDED PLEASE CONTACT THE LAB WITHIN 5 DAYS      Drug Screen Cutoff Levels:  AMPHETAMINE/METHAMPHETAMINES  1000 ng/mL  BARBITURATES     200 ng/mL  BENZODIAZEPINES     200 ng/mL  COCAINE      300 ng/mL  METHADONE      300 ng/mL  OPIATES      300 ng/mL  PHENCYCLIDINE     25 ng/mL  THC       50 ng/mL  OXYCODONE      100 ng/mL    POCT alcohol breath test [[id number]]  (Normal) Resulted: 03/19/21 1617    Lab Status: Final result Updated: 03/19/21 1617     EXTBreath Alcohol 0 00                 XR forearm 2 views LEFT   ED Interpretation by Jackie Carroll PA-C (03/19 1901)   No acute abnormalities seen by me at this time  Procedures  Procedures         ED Course  ED Course as of Mar 20 0048   Fri Mar 19, 2021   1819 Now complaining that he fell when running in traffic  Complains of left forearm pain  Will xray  1945 Patient is medically cleared  SBIRT 22yo+      Most Recent Value   SBIRT (24 yo +)   In order to provide better care to our patients, we are screening all of our patients for alcohol and drug use  Would it be okay to ask you these screening questions? No Filed at: 03/19/2021 1652   Initial Alcohol Screen: US AUDIT-C    1  How often do you have a drink containing alcohol?  0 Filed at: 03/19/2021 1652   2  How many drinks containing alcohol do you have on a typical day you are drinking? 0 Filed at: 03/19/2021 1652   3a  Male UNDER 65: How often do you have five or more drinks on one occasion? 0 Filed at: 03/19/2021 1652   3b  FEMALE Any Age, or MALE 65+: How often do you have 4 or more drinks on one occassion? 0 Filed at: 03/19/2021 1652   Audit-C Score  0 Filed at: 03/19/2021 1652   RONN: How many times in the past year have you    Used an illegal drug or used a prescription medication for non-medical reasons? Never Filed at: 03/19/2021 1652                    MDM  Number of Diagnoses or Management Options  Suicidal ideations: new and requires workup  Diagnosis management comments: DDX consists of but not limited to: depression, suicidal, bipolar, schizophrenia     Will check UDS, BAT and covid  Will consult Crisis  Patient seen by Crisis  201 signed  Chuck Sorenson from Crisis spoke with patient's mother and she is agreeable to plan  Patient complains now of left forearm pain  Patient reports that he fell while running into traffic today  No erythema, edema, ecchymosis or deformity of the hand/wrist/forearm/elbow  Normal ROM of all joints  Mild tenderness to palpation over the forearm  Will xray the area  Informed patient of xray findings  Awaiting placement  Patient accepted at [facility]  Transport will be here around 02:30 to  patient to transfer to [facility]  Patient signed out to Francis Edmonds PA-C awaiting transport  Patient stable  Amount and/or Complexity of Data Reviewed  Clinical lab tests: ordered and reviewed  Discuss the patient with other providers: yes  Independent visualization of images, tracings, or specimens: yes    Patient Progress  Patient progress: stable      Disposition  Final diagnoses:   Suicidal ideations     Time reflects when diagnosis was documented in both MDM as applicable and the Disposition within this note     Time User Action Codes Description Comment    3/19/2021  6:06 PM Dada FARAH Add [C04 428] Suicidal ideations     3/19/2021  9:46 PM Wesley Beltran Add [Z09] Follow up       ED Disposition     ED Disposition Condition Date/Time Comment    Transfer to [address] [place] [address] Mar 19, 2021 11:58 PM Enzo Mckeon should be transferred out to [facility] and has been medically cleared  MD Documentation      Most Recent Value   Patient Condition  The patient has been stabilized such that within reasonable medical probability, no material deterioration of the patient condition or the condition of the unborn child(benita) is likely to result from the transfer, No noted underlying medical condition requiring transfer to another facility   Transfer is per preference and request of patient and/or family   Reason for Transfer  Level of Care needed not available at this facility, No bed available at level of patient's needs   Benefits of Transfer  Continuity of care   Risks of Transfer  Other: (Include comment)__________________________ Elaina Cid   Accepting Physician  Dr Glenny Toro Name, [facility]    (Name & Tel number)  Laura Hussein [phone]   Transported by (Company and Unit #)  [facility]   Sending MD Dr Luda Nickerson   Provider Certification  The patient is stable for psychiatric transfer because they are medically stable, and is protected from harming him/herself or others during transport, General risk, such as traffic hazards, adverse weather conditions, rough terrain or turbulence, possible failure of equipment (including vehicle or aircraft), or consequences of actions of persons outside the control of the transport personnel      RN Documentation      Most Recent Value   Accepting Facility Name, [facility]    (Name & Tel number)  Laura Hussein [phone]   Report Given to  [phone]   Medications Reviewed with Next Provider of Service  Yes   Transport Mode  Ambulance   Transported by [employer] and Unit #)  [facility]   Level of Care  Basic life support   Copies of Medical Records Sent  Transfer form   Patient Belongings Disposition  Sent with patient      Follow-up Information    None         Patient's Medications   Discharge Prescriptions    No medications on file     No discharge procedures on file      PDMP Review     None          ED Provider  Electronically Signed by           Linda Choe PA-C  03/20/21 7433

## 2021-03-19 NOTE — LETTER
[facility] [address]  [address] [facility] [address]  Dept: [phone]      EMTALA TRANSFER CONSENT    NAME Alecia Bailon                                         1990                              MRN [id number]    I have been informed of my rights regarding examination, treatment, and transfer   by Dr Evita Paulson DO    Benefits: Continuity of care    Risks: Other: (Include comment)__________________________(Behavioral Health Patient)      { ED EMTALA TRANSFER CHOICES:[id number]}    I authorize the performance of emergency medical procedures and treatments upon me in both transit and upon arrival at the receiving facility  Additionally, I authorize the release of any and all medical records to the receiving facility and request they be transported with me, if possible  I understand that the safest mode of transportation during a medical emergency is an ambulance and that the Hospital advocates the use of this mode of transport  Risks of traveling to the receiving facility by car, including absence of medical control, life sustaining equipment, such as oxygen, and medical personnel has been explained to me and I fully understand them  (KEANU CORRECT BOX BELOW)  [  ]  I consent to the stated transfer and to be transported by ambulance/helicopter  [  ]  I consent to the stated transfer, but refuse transportation by ambulance and accept full responsibility for my transportation by car    I understand the risks of non-ambulance transfers and I exonerate the Hospital and its staff from any deterioration in my condition that results from this refusal     X___________________________________________    DATE  21  TIME________  Signature of patient or legally responsible individual signing on patient behalf           RELATIONSHIP TO PATIENT_________________________          Provider Certification    NAME Alecia Bailon  1990                              MRN [id number]    A medical screening exam was performed on the above named patient  Based on the examination:    Condition Necessitating Transfer The primary encounter diagnosis was Suicidal ideations  A diagnosis of Follow up was also pertinent to this visit  Patient Condition: The patient has been stabilized such that within reasonable medical probability, no material deterioration of the patient condition or the condition of the unborn child(benita) is likely to result from the transfer, No noted underlying medical condition requiring transfer to another facility  Transfer is per preference and request of patient and/or family    Reason for Transfer: Level of Care needed not available at this facility, No bed available at level of patient's needs    Transfer Requirements: [address] [place] [address]   · Space available and qualified personnel available for treatment as acknowledged by Shelia Lawler [phone]  · Agreed to accept transfer and to provide appropriate medical treatment as acknowledged by       Dr Maisha Cisse  · Appropriate medical records of the examination and treatment of the patient are provided at the time of transfer   [address] [place] [address] [address] _______  · Transfer will be performed by qualified personnel from [facility]  and appropriate transfer equipment as required, including the use of necessary and appropriate life support measures      Provider Certification: I have examined the patient and explained the following risks and benefits of being transferred/refusing transfer to the patient/family:  The patient is stable for psychiatric transfer because they are medically stable, and is protected from harming him/herself or others during transport, General risk, such as traffic hazards, adverse weather conditions, rough terrain or turbulence, possible failure of equipment (including vehicle or aircraft), or consequences of actions of persons outside the control of the transport personnel      Based on these reasonable risks and benefits to the patient and/or the unborn child(benita), and based upon the information available at the time of the patients examination, I certify that the medical benefits reasonably to be expected from the provision of appropriate medical treatments at another medical facility outweigh the increasing risks, if any, to the individuals medical condition, and in the case of labor to the unborn child, from effecting the transfer      X____________________________________________ DATE 03/19/21        TIME_______      ORIGINAL - SEND TO MEDICAL RECORDS   COPY - SEND WITH PATIENT DURING TRANSFER

## 2021-03-19 NOTE — ED NOTES
Dinner tray ordered for patient at this time        [facility]  03/19/21 0715

## 2021-03-19 NOTE — ED NOTES
Patient is a 31 y o M with hx of bipolar disorder, depression, autism spectrum disorder and Mild IDD, brought in by [employer] for psychiatric evaluation after being found running in a out-of-traffic and reporting suicidal ideations  PD reported they had to pull the patient out of traffic  On exam patient dressed in hospital scrubs  His speech is slow and monotone  He denies SI currently  Patient reports to feeling of sadness, loneliness and frustration  States he is "going through a lot" and just wants someone to be his friend  Admits to running in and out of traffic out of frustration, expresses guilt, and says he would not do it again  He reports he is hearing voices that command him to "do bad things and good things"  He is anxious  Denies HI or VH  Denies drug or alcohol use  Reports he just wants a friend, feels lonely, and has been thinking about his upcoming birthday  Patient reports he sees Dr Sawyer Martino at [facility]  Admits he at times is cheeking his medications  States he has staff but does not know the name of said staff, provider, or the services he receives  Reports he just wants a friend, feels lonely, and has been thinking about his upcoming birthday, but admits to having suicidal thoughts  States he lives in his own apartment  Patient has a developmental disability and is a poor historian  12 and Danielle Potts admission was discussed d/t his impulse behaviors, suicidal ideation, and poor insight  Patient signed a 12  EDDO Roxanne and [address] are in agreement and signed the same  Collateral obtained from patient's brother, Sheldon Aiken, cousin, and his mother via speaker phone  Brother and cousin report they also serve as contracted community support staff, however, patient has two hours of alone time daily  Today patient went for a walk alone and did not answer his phone, which is unusual per his brother  Family were not able to locate him   Police had called said that the patient was brought to the ER, but family claims they were not told why  Patient has hx of Autism, IDD, and bipolar d/o  Family reports patient has a hx of impulsive behavior that includes running in and out-of-traffic  Mom notes patient has been increasingly anxious, she believes this is due to him being at the end of his IM Invega schedule, which is due to be administered again on Monday 3/22 at [facility] by Dr Daniel Edmonds  Family reports they believe patient is taking his medication  Patient previously resided in a 1:1 staffed group home  He now lives in his own apartment with support staff, but has 2 hours of alone time  Patient has the following services and supports: medication management with [facility], Supports Coordination with [facility], and behavioral therapy through [facility]  Family is supportive of 12, but mom feels his medications should not be adjusted until there is collaboration with Dr Daniel Edmonds at [facility], and prefer [facility] over [facility], she states "they did not give him him medication when he was last there " Patient has no legal guardian  He can complete ADLs  Chief Complaint   Patient presents with    Psychiatric Evaluation     Pt was pulled out of traffic by [employer]  Pt informed [employer] that he was frustrated and suicidal which is why he raninto traffice  Pt denies si to current RN stating " i knew it was wrong, I was just frustrated      [address] crisis intake completed, safety risk assessment completed

## 2021-03-19 NOTE — Clinical Note
[place] [facility] should be transferred out to [facility] and has been medically cleared

## 2021-03-20 ENCOUNTER — HOSPITAL ENCOUNTER (INPATIENT)
Facility: HOSPITAL | Age: 31
LOS: 2 days | Discharge: HOME | DRG: 750 | End: 2021-03-22
Attending: STUDENT IN AN ORGANIZED HEALTH CARE EDUCATION/TRAINING PROGRAM | Admitting: PSYCHIATRY & NEUROLOGY
Payer: COMMERCIAL

## 2021-03-20 VITALS
OXYGEN SATURATION: 97 % | TEMPERATURE: 98.4 F | SYSTOLIC BLOOD PRESSURE: 128 MMHG | RESPIRATION RATE: 16 BRPM | HEART RATE: 92 BPM | DIASTOLIC BLOOD PRESSURE: 88 MMHG

## 2021-03-20 DIAGNOSIS — Z09 FOLLOW UP: ICD-10-CM

## 2021-03-20 PROBLEM — F84.0 AUTISM SPECTRUM DISORDER: Status: ACTIVE | Noted: 2021-03-20

## 2021-03-20 PROBLEM — Z00.8 MEDICAL CLEARANCE FOR PSYCHIATRIC ADMISSION: Status: ACTIVE | Noted: 2021-03-20

## 2021-03-20 PROBLEM — M25.512 LEFT SHOULDER PAIN: Status: ACTIVE | Noted: 2021-03-20

## 2021-03-20 PROBLEM — F31.9 BIPOLAR DISORDER (HCC): Status: ACTIVE | Noted: 2021-03-20

## 2021-03-20 PROBLEM — Z72.0 TOBACCO ABUSE: Status: ACTIVE | Noted: 2021-03-20

## 2021-03-20 LAB
CHOLEST SERPL-MCNC: 144 MG/DL (ref 50–200)
HDLC SERPL-MCNC: 53 MG/DL
LDLC SERPL CALC-MCNC: 78 MG/DL (ref 0–100)
NONHDLC SERPL-MCNC: 91 MG/DL
TRIGL SERPL-MCNC: 64 MG/DL

## 2021-03-20 PROCEDURE — 99253 IP/OBS CNSLTJ NEW/EST LOW 45: CPT | Performed by: PHYSICIAN ASSISTANT

## 2021-03-20 PROCEDURE — 80061 LIPID PANEL: CPT | Performed by: PSYCHIATRY & NEUROLOGY

## 2021-03-20 PROCEDURE — 99222 1ST HOSP IP/OBS MODERATE 55: CPT | Performed by: STUDENT IN AN ORGANIZED HEALTH CARE EDUCATION/TRAINING PROGRAM

## 2021-03-20 RX ORDER — IBUPROFEN 400 MG/1
400 TABLET ORAL EVERY 6 HOURS PRN
Status: DISCONTINUED | OUTPATIENT
Start: 2021-03-20 | End: 2021-03-22 | Stop reason: HOSPADM

## 2021-03-20 RX ORDER — LANOLIN ALCOHOL/MO/W.PET/CERES
3 CREAM (GRAM) TOPICAL
Status: DISCONTINUED | OUTPATIENT
Start: 2021-03-20 | End: 2021-03-20

## 2021-03-20 RX ORDER — HYDROXYZINE 50 MG/1
50 TABLET, FILM COATED ORAL
Status: DISCONTINUED | OUTPATIENT
Start: 2021-03-20 | End: 2021-03-22 | Stop reason: HOSPADM

## 2021-03-20 RX ORDER — DIVALPROEX SODIUM 500 MG/1
1000 TABLET, EXTENDED RELEASE ORAL
Status: DISCONTINUED | OUTPATIENT
Start: 2021-03-20 | End: 2021-03-22 | Stop reason: HOSPADM

## 2021-03-20 RX ORDER — IBUPROFEN 600 MG/1
600 TABLET ORAL EVERY 8 HOURS PRN
Status: DISCONTINUED | OUTPATIENT
Start: 2021-03-20 | End: 2021-03-22 | Stop reason: HOSPADM

## 2021-03-20 RX ORDER — BENZTROPINE MESYLATE 1 MG/1
1 TABLET ORAL
Status: DISCONTINUED | OUTPATIENT
Start: 2021-03-20 | End: 2021-03-22 | Stop reason: HOSPADM

## 2021-03-20 RX ORDER — HYDROXYZINE HYDROCHLORIDE 25 MG/1
25 TABLET, FILM COATED ORAL
Status: DISCONTINUED | OUTPATIENT
Start: 2021-03-20 | End: 2021-03-22 | Stop reason: HOSPADM

## 2021-03-20 RX ORDER — BISACODYL 10 MG
10 SUPPOSITORY, RECTAL RECTAL DAILY PRN
Status: DISCONTINUED | OUTPATIENT
Start: 2021-03-20 | End: 2021-03-22 | Stop reason: HOSPADM

## 2021-03-20 RX ORDER — MAGNESIUM HYDROXIDE/ALUMINUM HYDROXICE/SIMETHICONE 120; 1200; 1200 MG/30ML; MG/30ML; MG/30ML
30 SUSPENSION ORAL EVERY 4 HOURS PRN
Status: DISCONTINUED | OUTPATIENT
Start: 2021-03-20 | End: 2021-03-22 | Stop reason: HOSPADM

## 2021-03-20 RX ORDER — RISPERIDONE 0.25 MG/1
0.25 TABLET, FILM COATED ORAL
Status: DISCONTINUED | OUTPATIENT
Start: 2021-03-20 | End: 2021-03-22 | Stop reason: HOSPADM

## 2021-03-20 RX ORDER — AMOXICILLIN 250 MG
1 CAPSULE ORAL DAILY PRN
Status: DISCONTINUED | OUTPATIENT
Start: 2021-03-20 | End: 2021-03-22 | Stop reason: HOSPADM

## 2021-03-20 RX ORDER — MINERAL OIL AND PETROLATUM 150; 830 MG/G; MG/G
1 OINTMENT OPHTHALMIC
Status: DISCONTINUED | OUTPATIENT
Start: 2021-03-20 | End: 2021-03-22 | Stop reason: HOSPADM

## 2021-03-20 RX ORDER — LANOLIN ALCOHOL/MO/W.PET/CERES
6 CREAM (GRAM) TOPICAL
Status: DISCONTINUED | OUTPATIENT
Start: 2021-03-20 | End: 2021-03-22 | Stop reason: HOSPADM

## 2021-03-20 RX ORDER — BENZTROPINE MESYLATE 1 MG/ML
1 INJECTION INTRAMUSCULAR; INTRAVENOUS
Status: DISCONTINUED | OUTPATIENT
Start: 2021-03-20 | End: 2021-03-22 | Stop reason: HOSPADM

## 2021-03-20 RX ORDER — LORAZEPAM 2 MG/ML
1 INJECTION INTRAMUSCULAR EVERY 4 HOURS PRN
Status: DISCONTINUED | OUTPATIENT
Start: 2021-03-20 | End: 2021-03-22 | Stop reason: HOSPADM

## 2021-03-20 RX ORDER — RISPERIDONE 1 MG/1
1 TABLET, ORALLY DISINTEGRATING ORAL
Status: DISCONTINUED | OUTPATIENT
Start: 2021-03-20 | End: 2021-03-22 | Stop reason: HOSPADM

## 2021-03-20 RX ORDER — HALOPERIDOL 5 MG/ML
5 INJECTION INTRAMUSCULAR
Status: DISCONTINUED | OUTPATIENT
Start: 2021-03-20 | End: 2021-03-22 | Stop reason: HOSPADM

## 2021-03-20 RX ORDER — LORAZEPAM 2 MG/ML
2 INJECTION INTRAMUSCULAR
Status: DISCONTINUED | OUTPATIENT
Start: 2021-03-20 | End: 2021-03-22 | Stop reason: HOSPADM

## 2021-03-20 RX ORDER — RISPERIDONE 0.5 MG/1
0.5 TABLET, ORALLY DISINTEGRATING ORAL
Status: DISCONTINUED | OUTPATIENT
Start: 2021-03-20 | End: 2021-03-22 | Stop reason: HOSPADM

## 2021-03-20 RX ORDER — ACETAMINOPHEN 325 MG/1
650 TABLET ORAL EVERY 6 HOURS PRN
Status: DISCONTINUED | OUTPATIENT
Start: 2021-03-20 | End: 2021-03-22 | Stop reason: HOSPADM

## 2021-03-20 RX ORDER — QUETIAPINE FUMARATE 200 MG/1
400 TABLET, FILM COATED ORAL DAILY
Status: DISCONTINUED | OUTPATIENT
Start: 2021-03-20 | End: 2021-03-22 | Stop reason: HOSPADM

## 2021-03-20 RX ORDER — LORAZEPAM 0.5 MG/1
0.5 TABLET ORAL EVERY 6 HOURS PRN
Status: DISCONTINUED | OUTPATIENT
Start: 2021-03-20 | End: 2021-03-22 | Stop reason: HOSPADM

## 2021-03-20 RX ORDER — LORAZEPAM 1 MG/1
1 TABLET ORAL
Status: DISCONTINUED | OUTPATIENT
Start: 2021-03-20 | End: 2021-03-22 | Stop reason: HOSPADM

## 2021-03-20 RX ORDER — POLYETHYLENE GLYCOL 3350 17 G/17G
17 POWDER, FOR SOLUTION ORAL DAILY PRN
Status: DISCONTINUED | OUTPATIENT
Start: 2021-03-20 | End: 2021-03-22 | Stop reason: HOSPADM

## 2021-03-20 RX ORDER — BENZTROPINE MESYLATE 1 MG/ML
0.5 INJECTION INTRAMUSCULAR; INTRAVENOUS
Status: DISCONTINUED | OUTPATIENT
Start: 2021-03-20 | End: 2021-03-22 | Stop reason: HOSPADM

## 2021-03-20 RX ORDER — LORAZEPAM 2 MG/ML
1 INJECTION INTRAMUSCULAR
Status: DISCONTINUED | OUTPATIENT
Start: 2021-03-20 | End: 2021-03-22 | Stop reason: HOSPADM

## 2021-03-20 RX ORDER — HALOPERIDOL 5 MG/ML
2.5 INJECTION INTRAMUSCULAR
Status: DISCONTINUED | OUTPATIENT
Start: 2021-03-20 | End: 2021-03-22 | Stop reason: HOSPADM

## 2021-03-20 RX ORDER — QUETIAPINE FUMARATE 200 MG/1
400 TABLET, FILM COATED ORAL
Status: DISCONTINUED | OUTPATIENT
Start: 2021-03-20 | End: 2021-03-22 | Stop reason: HOSPADM

## 2021-03-20 RX ADMIN — QUETIAPINE FUMARATE 400 MG: 200 TABLET ORAL at 13:05

## 2021-03-20 RX ADMIN — MELATONIN TAB 3 MG 6 MG: 3 TAB at 21:32

## 2021-03-20 RX ADMIN — DIVALPROEX SODIUM 1000 MG: 500 TABLET, EXTENDED RELEASE ORAL at 21:32

## 2021-03-20 RX ADMIN — QUETIAPINE FUMARATE 400 MG: 200 TABLET ORAL at 21:32

## 2021-03-20 NOTE — PLAN OF CARE
Attended 3/4 groups today      Problem: Ineffective Coping  Goal: Participates in unit activities  Description: Interventions:  - Provide therapeutic environment   - Provide required programming   - Redirect inappropriate behaviors   Outcome: Progressing

## 2021-03-20 NOTE — EMTALA/ACUTE CARE TRANSFER
[facility]  [address] [facility] [address]  Dept: [phone]      EMTALA TRANSFER CONSENT    NAME Jossie Grant                                         1990                              MRN [id number]    I have been informed of my rights regarding examination, treatment, and transfer   by Dr Mathew att  providers found    Benefits: Continuity of care    Risks: Other: (Include comment)__________________________(Behavioral Health Patient)      Consent for Transfer:  I acknowledge that my medical condition has been evaluated and explained to me by the emergency department physician or other qualified medical person and/or my attending physician, who has recommended that I be transferred to the service of  Accepting Physician: Dr Matt Gipson at [address] [place] [address] Name, [address] : [place], [place]  The above potential benefits of such transfer, the potential risks associated with such transfer, and the probable risks of not being transferred have been explained to me, and I fully understand them  The doctor has explained that, in my case, the benefits of transfer outweigh the risks  I agree to be transferred  I authorize the performance of emergency medical procedures and treatments upon me in both transit and upon arrival at the receiving facility  Additionally, I authorize the release of any and all medical records to the receiving facility and request they be transported with me, if possible  I understand that the safest mode of transportation during a medical emergency is an ambulance and that the Hospital advocates the use of this mode of transport  Risks of traveling to the receiving facility by car, including absence of medical control, life sustaining equipment, such as oxygen, and medical personnel has been explained to me and I fully understand them      (KEANU CORRECT BOX BELOW)  [  ]  I consent to the stated transfer and to be transported by ambulance/helicopter  [  ]  I consent to the stated transfer, but refuse transportation by ambulance and accept full responsibility for my transportation by car  I understand the risks of non-ambulance transfers and I exonerate the Hospital and its staff from any deterioration in my condition that results from this refusal     X___________________________________________    DATE  21  TIME________  Signature of patient or legally responsible individual signing on patient behalf           RELATIONSHIP TO PATIENT_________________________          Provider Certification    NAME Devyn FALCON 1990                              MRN [id number]    A medical screening exam was performed on the above named patient  Based on the examination:    Condition Necessitating Transfer The primary encounter diagnosis was Suicidal ideations  A diagnosis of Follow up was also pertinent to this visit  Patient Condition: The patient has been stabilized such that within reasonable medical probability, no material deterioration of the patient condition or the condition of the unborn child(benita) is likely to result from the transfer, No noted underlying medical condition requiring transfer to another facility   Transfer is per preference and request of patient and/or family    Reason for Transfer: Level of Care needed not available at this facility, No bed available at level of patient's needs    Transfer Requirements: [address]   · Space available and qualified personnel available for treatment as acknowledged by Roderick Samson [phone]  · Agreed to accept transfer and to provide appropriate medical treatment as acknowledged by       Dr Xavier Aviles  · Appropriate medical records of the examination and treatment of the patient are provided at the time of transfer   [address], [address] _______  · Transfer will be performed by qualified personnel from [place]  and appropriate transfer equipment as required, including the use of necessary and appropriate life support measures  Provider Certification: I have examined the patient and explained the following risks and benefits of being transferred/refusing transfer to the patient/family:  The patient is stable for psychiatric transfer because they are medically stable, and is protected from harming him/herself or others during transport, General risk, such as traffic hazards, adverse weather conditions, rough terrain or turbulence, possible failure of equipment (including vehicle or aircraft), or consequences of actions of persons outside the control of the transport personnel      Based on these reasonable risks and benefits to the patient and/or the unborn child(benita), and based upon the information available at the time of the patients examination, I certify that the medical benefits reasonably to be expected from the provision of appropriate medical treatments at another medical facility outweigh the increasing risks, if any, to the individuals medical condition, and in the case of labor to the unborn child, from effecting the transfer      X____________________________________________ DATE 03/20/21        TIME_______      ORIGINAL - SEND TO MEDICAL RECORDS   COPY - SEND WITH PATIENT DURING TRANSFER

## 2021-03-20 NOTE — ED NOTES
Assumed care for pt at this time  Pt showing no signs of distress  Pt standing on bed looking out window  Pt redirectable and got off bed  1:1 continued       Yuli Tejada, NIMO  03/19/21 4521

## 2021-03-20 NOTE — PLAN OF CARE
Problem: SELF HARM/SUICIDALITY  Goal: Will have no self-injury during hospital stay  Description: INTERVENTIONS:  - Q 15 MINUTES: Routine safety checks  - Q WAKING SHIFT & PRN: Assess risk to determine if routine checks are adequate to maintain patient safety  - Encourage patient to participate actively in care by formulating a plan to combat response to suicidal ideation, identify supports and resources  Outcome: Progressing     Problem: DEPRESSION  Goal: Will be euthymic at discharge  Description: INTERVENTIONS:  - Administer medication as ordered  - Provide emotional support via 1:1 interaction with staff  - Encourage involvement in milieu/groups/activities  - Monitor for social isolation  Outcome: Progressing

## 2021-03-20 NOTE — ED NOTES
Patient is accepted at [facility]  Patient is accepted by Dr Bijan Aviles per Delon Raya, Intake     Transportation is arranged with TBD  Transportation is scheduled for TBD  Patient may go to the floor at D  ([phone]: [facility] calling or [facility] with p/u)        Nurse report is to be called to [phone] prior to patient transfer

## 2021-03-20 NOTE — PROGRESS NOTES
Pt signed 72 hour notice this morning at 0800  Pt states "I just want to go home"  When asked about SI, pt denies and states "I feel much safer now"  Wandering unit  Attended breakfast and morning group

## 2021-03-20 NOTE — CONSULTS
[address] 1990, 31 y o  male MRN: [id number]  Unit/Bed#: [facility] 897-67 Encounter: [id number]  Primary Care Provider: Josiah Kayser, CRNP   Date and time admitted to hospital: 3/20/2021  2:39 AM    Inpatient consult for Medical Clearance for [address] patient  Consult performed by: Elfego Chaney PA-C  Consult ordered by: Erik Roe MD          Medical clearance for psychiatric admission  Assessment & Plan   At this time, patient appears medically stable to continue inpatient psychiatric management   Current labs, nursing notes and imaging reviewed  o Pending Labs:  None  o Completed/resulted labs:  Lipid panel, COVID-19, urine drug screen   Recent EKG:  No recent EKG   Imaging including x-rays reviewed      Autism spectrum disorder  Assessment & Plan  Continue supportive care    * Bipolar disorder [facility])  Assessment & Plan  As per primary service    Left shoulder pain  Assessment & Plan  · Noted to be chronic and secondary to a prior injury and prior surgery  ·   Continue supportive care with p r n  Tylenol  ·  can follow-up as an outpatient    Tobacco abuse  Assessment & Plan  ·  on cessation  · Continue PRN nicorette gum    Recommendations for Discharge:  · [facility] will continue to be available for any questions or concerns  · Follow up with PCP upon discharge  Counseling / Coordination of Care Time: 30 minutes  Greater than 50% of total time spent on patient counseling and coordination of care  Collaboration of Care: Were Recommendations Directly Discussed with Primary Treatment Team? - Yes     History of Present Illness:    Gay Ferreira is a 31 y o  male who is originally admitted to the psychiatric service due to suicide attempt by running into traffic  We are consulted for medical clearance for psychiatric hospitalization and medical management    Patient was brought to the [place] [address] Emergency Department after he was noted to run into traffic and informed police that he was frustrated, suicidal and was trying to kill himself  The patient had reported auditory hallucinations as well  He was subsequently transferred to [address] [place] [address]  His past medical history of asthma, autism, bipolar disorder  no nausea or vomiting  No chest pain or shortness of breath  Reports that he has some left shoulder pain  He reports that this is been present since a prior surgery  This is not changed  No fevers chills  Reports that he smokes  He does not drink  Review of Systems:    Review of Systems   Constitutional: Negative for chills, diaphoresis, fatigue, fever and unexpected weight change  HENT: Negative for sore throat  Respiratory: Negative for cough, chest tightness and shortness of breath  Cardiovascular: Negative for chest pain, palpitations and leg swelling  Gastrointestinal: Negative for abdominal pain, diarrhea, nausea and vomiting  Genitourinary: Negative for dysuria  Musculoskeletal: Positive for arthralgias  Negative for back pain, gait problem, joint swelling and myalgias  Neurological: Negative for dizziness, syncope, weakness, numbness and headaches  Psychiatric/Behavioral: Positive for behavioral problems and suicidal ideas  All other systems reviewed and are negative  Past Medical and Surgical History:     Past Medical History:   Diagnosis Date    Asthma     Autism spectrum disorder     Bipolar 1 disorder (HCC)     Cognitive impairment     Depression     Intellectual functioning disability     Intestinal disaccharidase deficiency     Mood swings     Psychiatric illness     Schizo-affective schizophrenia ([facility] [address] )     Self-injurious behavior        Past Surgical History:   Procedure Laterality Date    FRACTURE SURGERY      SHOULDER SURGERY         Meds/Allergies:    PTA meds:   Prior to Admission Medications   Prescriptions Last Dose Informant Patient Reported? Taking? Melatonin ER 10 MG TBCR   Yes No   Sig: Take 10 mg by mouth   QUEtiapine (SEROquel) 400 MG tablet   Yes No   Sig: Take 400 mg by mouth 2 (two) times a day    albuterol (PROVENTIL HFA,VENTOLIN HFA) 90 mcg/act inhaler   No No   Sig: Inhale 1-2 puffs every 6 (six) hours as needed for wheezing   cloNIDine (CATAPRES) 0 1 mg tablet   Yes No   Sig: Take by mouth 2 (two) times a day    divalproex sodium (DEPAKOTE ER) 250 mg 24 hr tablet   Yes No   Sig: Take 250 mg by mouth daily at bedtime    divalproex sodium (DEPAKOTE ER) 500 mg 24 hr tablet   Yes No   Sig: Take 1,000 mg by mouth daily XR formula      Facility-Administered Medications: None       Allergies: Allergies   Allergen Reactions    Shellfish-Derived Products     Lactase Other (See Comments)     Diarrhea       Social History:     Marital Status: Single    Substance Use History:   Social History     Substance and Sexual Activity   Alcohol Use No    Frequency: Never    Drinks per session: 1 or 2    Binge frequency: Never     Social History     Tobacco Use   Smoking Status Current Every Day Smoker    Packs/day: 0 25    Types: Cigarettes   Smokeless Tobacco Never Used     Social History     Substance and Sexual Activity   Drug Use Not Currently    Types: "Crack" cocaine, Cocaine       Family History:    History reviewed  No pertinent family history  Physical Exam:     Vitals:   Blood Pressure: 143/85 (03/20/21 1049)  Pulse: 100 (03/20/21 1049)  Temperature: (!) 97 °F (36 1 °C) (03/20/21 1049)  Temp Source: Tympanic (03/20/21 1049)  Respirations: 18 (03/20/21 1049)  Height: 5' (152 4 cm) (03/20/21 0331)  Weight - Scale: 72 6 kg (160 lb) (03/20/21 0331)  SpO2: 96 % (03/20/21 0331)    Physical Exam  Vitals signs and nursing note reviewed  Constitutional:       General: He is not in acute distress  Appearance: Normal appearance  He is not diaphoretic  HENT:      Head: Normocephalic and atraumatic     Cardiovascular:      Rate and Rhythm: Normal rate and regular rhythm  Pulmonary:      Effort: Pulmonary effort is normal       Breath sounds: Normal breath sounds  No stridor  No wheezing, rhonchi or rales  Abdominal:      General: Bowel sounds are normal       Palpations: Abdomen is soft  Tenderness: There is no abdominal tenderness  There is no guarding  Skin:     General: Skin is warm and dry  Neurological:      Mental Status: He is alert  Comments: Cranial nerves 2-12 grossly intact  Follows commands  Speech slow but clear  Ambulating throughout the hallways without unsteadiness  Psychiatric:      Comments: Flat affect           Additional Data:     Lab Results:        Lab Results   Component Value Date/Time    HGBA1C 5 4 04/22/2019 11:59 AM               Imaging: I have personally reviewed pertinent reports  Arm xrays negative    EKG, Pathology, and Other Studies Reviewed on Admission:   · EKG: no recent EKG on file    ** Please Note: This note has been constructed using a voice recognition system   **

## 2021-03-20 NOTE — ED NOTES
[facility] indicates:   Eligible with [facility] ID: [id number]    [facility], spoke with patient presentative  Placed in queue d/t high call volume

## 2021-03-20 NOTE — ASSESSMENT & PLAN NOTE
 At this time, patient appears medically stable to continue inpatient psychiatric management   Current labs, nursing notes and imaging reviewed    o Pending Labs:  None  o Completed/resulted labs:  Lipid panel, COVID-19, urine drug screen   Recent EKG:  No recent EKG   Imaging including x-rays reviewed

## 2021-03-20 NOTE — H&P
Psychiatric Evaluation - Jagdish 437 31 y o  male MRN: [id number]  Unit/Bed#: Megan Yo 552-17 Encounter: [id number]    Assessment/Plan   Active Problems:    * No active hospital problems  *    Plan:   Continue depakote ER 1000 mg PO HS  Seroquel 400 mg PO BID  Melatonin 6 mg PO HS  Check admission labs  Collaborate with family for baseline assessment and disposition planning  Case discussed with treatment team     Treatment options and alternatives were reviewed with the patient, who concurs with the above plan  Risks, benefits, and possible side effects of medications were explained to the patient, and he verbalizes understanding       -----------------------------------    Chief Complaint: " I was frustrated"    History of Present Illness     Per ED provider on 3/19: "30y  o male with PMH of asthma, autism, bipolar, depression and schizoaffective schizophrenia presents to the ER for psychiatric evaluation  Per [employer], patient was pulled out of traffic by them  The patient stated he wanted to die so he ran into traffic  Patient told [employer] and nursing staff that he was suicidal but denied it to me  When asked why he ran into traffic, patient states he was frustrated and just wanted to get away  He denies HI  He reports auditory hallucinations but denies visual hallucinations  He follows with a psychiatrist and therapist at [facility] twice a week  He reports taking his medications as prescribed  He has been admitted previously to a behavioral health unit  He denies drug or alcohol use but does admit to smoking cigarettes occasionally  He reports that he lives alone in an apartment by [employer]   He denies fever, chills, URI symptoms, chest pain, dyspnea, N/V/D, abdominal pain, weakness or paresthesias "    Per Crisis worker on 3/19: "Patient is a 31 y o M with hx of bipolar disorder, depression, autism spectrum disorder and Mild IDD, brought in by [employer] for psychiatric evaluation after being found running in a out-of-traffic and reporting suicidal ideations  PD reported they had to pull the patient out of traffic  On exam patient dressed in hospital scrubs  His speech is slow and monotone  He denies SI currently  Patient reports to feeling of sadness, loneliness and frustration  States he is "going through a lot" and just wants someone to be his friend  Admits to running in and out of traffic out of frustration, expresses guilt, and says he would not do it again  He reports he is hearing voices that command him to "do bad things and good things"  He is anxious  Denies HI or VH  Denies drug or alcohol use  Reports he just wants a friend, feels lonely, and has been thinking about his upcoming birthday  Patient reports he sees Dr Colette Banegas at [facility]  Admits he at times is cheeking his medications  States he has staff but does not know the name of said staff, provider, or the services he receives  Reports he just wants a friend, feels lonely, and has been thinking about his upcoming birthday, but admits to having suicidal thoughts  States he lives in his own apartment  Patient has a developmental disability and is a poor historian  12 and German Fabry admission was discussed d/t his impulse behaviors, suicidal ideation, and poor insight  Patient signed a 12  EDDO Roxanne and [address] are in agreement and signed the same       Collateral obtained from patient's brother, Fabiana Garcia, cousin, and his mother via speaker phone  Brother and cousin report they also serve as contracted community support staff, however, patient has two hours of alone time daily  Today patient went for a walk alone and did not answer his phone, which is unusual per his brother  Family were not able to locate him  Police had called said that the patient was brought to the ER, but family claims they were not told why  Patient has hx of Autism, IDD, and bipolar d/o   Family reports patient has a hx of impulsive behavior that includes running in and out-of-traffic  Mom notes patient has been increasingly anxious, she believes this is due to him being at the end of his IM Invega schedule, which is due to be administered again on Monday 3/22 at [facility] by Dr Ang Estevez  Family reports they believe patient is taking his medication  Patient previously resided in a 1:1 staffed group home  He now lives in his own apartment with support staff, but has 2 hours of alone time  Patient has the following services and supports: medication management with [facility], Supports Coordination with [facility], and behavioral therapy through [facility]  Family is supportive of 12, but mom feels his medications should not be adjusted until there is collaboration with Dr Ang Estevez at [facility], and prefer [facility] over [facility], she states "they did not give him him medication when he was last there " Patient has no legal guardian  He can complete ADLs "    This is 30 yo male with hx of Autism spectrum disorder and bipolar disorder admitted to inpatient unit on voluntary status for running in to traffic  Patient says that he was living in group homes all his life and currently by himself with some staff to watch him  Patient reports that he frustrated and depressed as he doesn't have any friends to talk to so walking in the middle of the road  He realizes mistake "I should walked on the side  I say that I am going to hurt myself but I never do  I frustrated and angry at times" Patient reports that he is feeling better now and wants to be home for his birthday  He gave 72 hour notice  Denies SI/HI/AH/VH        Psychiatric Review Of Systems:  Problems with sleep: no  Appetite changes: no  Weight changes: no  Low energy/anergy: no  Low interest/pleasure/anhedonia: no  Somatic symptoms: no  Anxiety/panic: no  Marixa: no  Guilt/hopeless: no  Self injurious behavior/risky behavior: yes    Medical Review Of Systems:  Complete review of systems is negative except as noted above     Historical Information     Psychiatric History:   Psychiatric medication trial: Multiple, patient is unsure which medications have been trialed previously  Inpatient hospitalizations: Denies  Suicide attempts: Denies  Violent behavior: Hx of cutting behavior  Outpatient treatment: Yes    Substance Abuse History:  Social History     Tobacco Use    Smoking status: Current Every Day Smoker     Packs/day: 0 25     Types: Cigarettes    Smokeless tobacco: Never Used   Substance Use Topics    Alcohol use: No     Frequency: Never     Drinks per session: 1 or 2     Binge frequency: Never    Drug use: Not Currently     Types: "Crack" cocaine, Cocaine      Patient denies use of tobacco, alcohol, or illicit drugs  I have assessed this patient for substance use within the past 12 months  I spent time with Garrick Ramirez in counseling and education on risk of substance abuse  I assessed motivation and encouraged him for treatment as appropriate  Family Psychiatric History:   Patient denies any known family history of psychiatric illness, suicide attempt, or substance abuse    Social History:  Education: high school diploma/GED  Learning Disabilities: special education  Marital history: single  Living arrangement: Lives alone  Occupational History: unemployed  Functioning Relationships: good support system    Other Pertinent History: None      Traumatic History:   Abuse: denies  Other Traumatic Events: none reported    Past Medical History:   Past Medical History:   Diagnosis Date    Asthma     Autism spectrum disorder     Bipolar 1 disorder (HCC)     Cognitive impairment     Depression     Intellectual functioning disability     Intestinal disaccharidase deficiency     Mood swings     Psychiatric illness     [facility]     Self-injurious behavior         -----------------------------------  Objective    Temp:  [96 6 °F (35 9 °C)-98 4 °F (36 9 °C)] 97 °F (36 1 °C)  HR:  [] 100  Resp:  [16-18] 18  BP: (128-143)/(84-98) 143/85    Mental Status Evaluation:  Appearance:  alert, good eye contact and appears younger than stated age   Behavior:  calm and cooperative   Speech:  spontaneous, normal rate, normal volume and coherent   Mood:  anxious   Affect:  mood-congruent   Thought Process:  organized, goal directed   Thought Content: no verbalized delusions or overt paranoia   Perceptual disturbances: no reported hallucinations and does not appear to be responding to internal stimuli at this time   Risk Potential: No active or passive suicidal or homicidal ideation was verbalized during interview, Low potential for aggression based on previous behavior   Sensorium: person, place, time/date, situation, day of week, month of year and year   Memory: recent and remote memory grossly intact   Consciousness: alert   Attention: attention span appeared shorter than expected for age   Insight:  Limited   Judgment: Fair   Gait/Station: normal gait/station   Motor Activity: no abnormal movements     Meds/Allergies   Allergies   Allergen Reactions    Shellfish-Derived Products     Lactase Other (See Comments)     Diarrhea     all current active meds have been reviewed    Behavioral Health Medications: all current active meds have been reviewed  Changes as above  Laboratory results:  I have personally reviewed all pertinent laboratory/tests results    Recent Results (from the past 48 hour(s))   POCT alcohol breath test    Collection Time: 03/19/21  4:17 PM   Result Value Ref Range    EXTBreath Alcohol 0 00    Rapid drug screen, urine    Collection Time: 03/19/21  4:19 PM   Result Value Ref Range    Amph/Meth UR Negative Negative    Barbiturate Ur Negative Negative    Benzodiazepine Urine Negative Negative    Cocaine Urine Negative Negative    Methadone Urine Negative Negative    Opiate Urine Negative Negative    PCP Ur Negative Negative    THC Urine Negative Negative    Oxycodone Urine Negative Negative   COVID[facility], Influ[facility]B, [facility]    Collection Time: 03/19/21  4:36 PM    Specimen: Nasopharyngeal Swab   Result Value Ref Range    SARS-CoV-2 Negative Negative    INFLUENZA A PCR Negative Negative    INFLUENZA B PCR Negative Negative    RSV PCR Negative Negative              -----------------------------------    Risks / Benefits of Treatment:     Risks, benefits, and possible side effects of medications explained to patient  The patient verbalizes understanding and agreement for treatment  Counseling / Coordination of Care:     Patient's presentation on admission and proposed treatment plan were discussed with the treatment team   Diagnosis, medication changes and treatment plan were reviewed with the patient  Recent stressors were discussed with the patient  Events leading to admission were reviewed with the patient  Importance of medication and treatment compliance was reviewed with the patient

## 2021-03-20 NOTE — ASSESSMENT & PLAN NOTE
· Noted to be chronic and secondary to a prior injury and prior surgery  ·   Continue supportive care with p r n   Tylenol  ·  can follow-up as an outpatient

## 2021-03-20 NOTE — PROGRESS NOTES
201 from [facility]  Per crisis -  31 y o M with hx of bipolar disorder, depression, autism spectrum disorder and Mild IDD, brought in by [employer] for psychiatric evaluation after being found running in a out-of-traffic and reporting suicidal ideations  PD reported they had to pull the patient out of traffic  He denies SI currently  Patient reports to feeling of sadness, loneliness and frustration  States he is "going through a lot" and just wants someone to be his friend  Pt lives in apartment alone with supports  Went out walking by self and did not answer his phone  Picked up by [employer] and taken to [facility]  Pt calm and cooperative  Answered most questions  Little Hard of Hearing, not reported that patient is [place]  Told [facility] that he can sign and read lips  Placed [place] sign on door  UDS and BAT negative  Pt reported that he smokes 2 cigarettes a day  His birthday is the March 23 and he would like to be discharged Monday  He has allergies to Shellfish and Milk  Pt denied SI, HI and AVH

## 2021-03-20 NOTE — ED NOTES
Insurance Authorization for admission:   Phone call placed to [employer] number:[phone]  Spoke to [employer]     4 days approved  Level of care: IP  Review on 3/23/21  Authorization # Call upon arrival      EVS (Eligibility Verification System) called - [phone]  Automated system indicates: eligible    Insurance Authorization for Transportation:  TBD  Phone call placed to **  Phone number **  Spoke to **     Authorization #: **

## 2021-03-20 NOTE — CMS CERTIFICATION NOTE
Recertification: Based upon physical, mental and social evaluations, I certify that inpatient psychiatric services continue to be medically necessary for this patient for a duration of 7 midnights for the treatment of  Bipolar disorder [facility])   Available alternative community resources still do not meet the patient's mental health care needs  I further attest that an established written individualized plan of care has been updated and is outlined in the patient's medical records

## 2021-03-20 NOTE — PROGRESS NOTES
This writer heard a loud banging sound while sitting behind the nurses station, and upon walking out to the fowler, pt seemed agitated  Writer prompted him and asked if he had just thrown the chair under the phone, and pt shook his head "yes "  Pt then put his head down, broke eye contact and walked back to his room, slamming his door shut  Writer gave him a minute then followed up with him, explaining that she is his nurse for the evening and would prefer that he speaks to a staff member, instead of becoming physical   Pt nodded in agreement and explained that he was upset after his mother informed him she would not be bringing him more clothing  Writer asked if pt acts out in physical manner at home, to which he replied "no "  Writer then asked that he treat the hospital property with the same respect that he treats his personal property, he acknowledged  Writer also explained to pt that the donation clothing for the hospital is used for patients who have no clothing at all after pt requested an outfit from donations  Pt did request to shower following conversation, and writer explained he is welcome to wash his current outfit and put hospital attire on in process, he agreed to shower and wash clothes  Pt is now observed smiling and walking halls  Appears to be pleasant and cooperative now, CFS on unit

## 2021-03-20 NOTE — PROGRESS NOTES
In room:  Sweatpants    In locker:  [employer], sneakers with laces, hoodie x2, tank top, earphones, cellphone, hoodie dress, unopened soda    In security:  $33 28 envelope # [id number]

## 2021-03-20 NOTE — PROGRESS NOTES
Pt appears anxious and pacing unit this afternoon  Staff observed pt lightly punch wall in hallway  Declines PRN when offered  States "I'll be ok"  Scant in conversation  Does not offer much except "I want to go home"  Encouraged pt to seek staff support if needed

## 2021-03-20 NOTE — TREATMENT PLAN
TREATMENT PLAN REVIEW - [address] BusheEdwards 31 y o  1990 male MRN: [id number]    6 [address] [place] [address] Room / Bed: Efraín Welsh [facility] 997-10 Encounter: [id number]          Admit Date/Time:  3/20/2021  2:39 AM    Treatment Team: Attending Provider: Nabila Bermudez MD; Patient Care Assistant: Keely Patel;  Patient Care Assistant: Brian Gamboa; Registered Nurse: Poli Erazo LPN; Registered Nurse: Sharri Be RN; Registered Nurse: Kyung Fabry, NIMO; Occupational Therapy Assistant: SARAH Acosta; Patient Care Assistant: Valerie Spain; Security: Garlon Tebbetts    Diagnosis: Principal Problem:    Bipolar disorder [facility]  Active Problems:    Autism spectrum disorder      Patient Strengths/Assets: good support system, motivation for treatment/growth, patient is on a voluntary commitment    Patient Barriers/Limitations: poor insight    Short Term Goals: decrease in depressive symptoms, decrease in anxiety symptoms, decrease in suicidal thoughts, decrease in self abusive behaviors    Long Term Goals: improvement in depression, improvement in anxiety, resolution of depressive symptoms, stabilization of mood, free of suicidal thoughts, no self abusive behavior, adequate self care    Progress Towards Goals: starting psychiatric medications as prescribed    Recommended Treatment: medication management, patient medication education, group therapy, milieu therapy, continued Behavioral Health psychiatric evaluation/assessment process    Treatment Frequency: daily medication monitoring, group and milieu therapy daily, monitoring through interdisciplinary rounds, monitoring through weekly patient care conferences    Expected Discharge Date:  5-7 days    Discharge Plan: referral for outpatient medication management with a psychiatrist, referral for outpatient psychotherapy    Treatment Plan Created/Updated By: Nabila Bermudez MD

## 2021-03-21 PROCEDURE — 99232 SBSQ HOSP IP/OBS MODERATE 35: CPT | Performed by: STUDENT IN AN ORGANIZED HEALTH CARE EDUCATION/TRAINING PROGRAM

## 2021-03-21 RX ADMIN — DIVALPROEX SODIUM 1000 MG: 500 TABLET, EXTENDED RELEASE ORAL at 21:28

## 2021-03-21 RX ADMIN — QUETIAPINE FUMARATE 400 MG: 200 TABLET ORAL at 09:19

## 2021-03-21 RX ADMIN — QUETIAPINE FUMARATE 400 MG: 200 TABLET ORAL at 21:28

## 2021-03-21 RX ADMIN — MELATONIN TAB 3 MG 6 MG: 3 TAB at 21:28

## 2021-03-21 RX ADMIN — IBUPROFEN 600 MG: 600 TABLET ORAL at 16:59
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
## 2021-03-21 NOTE — PROGRESS NOTES
Progress Note - Behavioral Health   Columbia Medici 31 y o  male MRN: [id number]  Unit/Bed#: Armando Herman 488-80 Encounter: [id number]    Assessment/Plan   Principal Problem:    Bipolar disorder ([address] )  Active Problems:    Autism spectrum disorder    Medical clearance for psychiatric admission    Left shoulder pain    Tobacco abuse      Subjective: Patient was seen, chart reviewed and case discussed with team  As per report patient is pacing, punched wall and threw a chair after phone call  Today patient is calm and cooperative  Sleep and appetite are good  Denies SI/HI/AH/VH  Patient is focused on discharge and wants to go home by birthday     Psychiatric Review of Systems:  Behavior over the last 24 hours:  improved  Sleep: normal  Appetite: normal  Medication side effects: No  ROS: no complaints, all others negative    Current Medications:  Current Facility-Administered Medications   Medication Dose Route Frequency    acetaminophen (TYLENOL) tablet 650 mg  650 mg Oral Q6H PRN    aluminum-magnesium hydroxide-simethicone (MYLANTA) oral suspension 30 mL  30 mL Oral Q4H PRN    artificial tear (LUBRIFRESH P M ) ophthalmic ointment 1 application  1 application Both Eyes J7E PRN    haloperidol lactate (HALDOL) injection 2 5 mg  2 5 mg Intramuscular Q4H PRN Max 4/day    And    LORazepam (ATIVAN) injection 1 mg  1 mg Intramuscular Q4H PRN Max 4/day    And    benztropine (COGENTIN) injection 0 5 mg  0 5 mg Intramuscular Q4H PRN Max 4/day    haloperidol lactate (HALDOL) injection 5 mg  5 mg Intramuscular Q4H PRN Max 4/day    And    LORazepam (ATIVAN) injection 2 mg  2 mg Intramuscular Q4H PRN Max 4/day    And    benztropine (COGENTIN) injection 1 mg  1 mg Intramuscular Q4H PRN Max 4/day    benztropine (COGENTIN) injection 1 mg  1 mg Intramuscular Q4H PRN Max 6/day    benztropine (COGENTIN) tablet 1 mg  1 mg Oral Q4H PRN Max 6/day    bisacodyl (DULCOLAX) rectal suppository 10 mg  10 mg Rectal Daily PRN    divalproex sodium (DEPAKOTE ER) 24 hr tablet 1,000 mg  1,000 mg Oral HS    hydrOXYzine HCL (ATARAX) tablet 25 mg  25 mg Oral Q6H PRN Max 4/day    hydrOXYzine HCL (ATARAX) tablet 50 mg  50 mg Oral Q4H PRN Max 4/day    Or    LORazepam (ATIVAN) injection 1 mg  1 mg Intramuscular Q4H PRN    ibuprofen (MOTRIN) tablet 400 mg  400 mg Oral Q6H PRN    ibuprofen (MOTRIN) tablet 600 mg  600 mg Oral Q8H PRN    LORazepam (ATIVAN) tablet 1 mg  1 mg Oral Q4H PRN Max 6/day    Or    LORazepam (ATIVAN) injection 2 mg  2 mg Intramuscular Q6H PRN Max 3/day    LORazepam (ATIVAN) tablet 0 5 mg  0 5 mg Oral Q6H PRN    melatonin tablet 6 mg  6 mg Oral HS    nicotine polacrilex (NICORETTE) gum 2 mg  2 mg Oral Q2H PRN    polyethylene glycol (MIRALAX) packet 17 g  17 g Oral Daily PRN    QUEtiapine (SEROquel) tablet 400 mg  400 mg Oral HS    QUEtiapine (SEROquel) tablet 400 mg  400 mg Oral Daily    risperiDONE (RisperDAL M-TABS) dispersible tablet 0 5 mg  0 5 mg Oral Q4H PRN Max 3/day    risperiDONE (RisperDAL M-TABS) dispersible tablet 1 mg  1 mg Oral Q4H PRN Max 6/day    risperiDONE (RisperDAL) tablet 0 25 mg  0 25 mg Oral Q4H PRN Max 6/day    senna-docusate sodium (SENOKOT S) 8 6-50 mg per tablet 1 tablet  1 tablet Oral Daily PRN       Behavioral Health Medications: all current active meds have been reviewed  Vitals:  Vitals:    03/21/21 0649   BP: 144/92   Pulse: 91   Resp: 18   Temp: (!) 97 1 °F (36 2 °C)   SpO2:        Laboratory results:  I have personally reviewed all pertinent laboratory/tests results      Mental Status Evaluation:  Appearance:  age appropriate and casually dressed   Behavior:  restless and fidgety   Speech:  normal pitch and normal volume   Mood:  anxious   Affect:  labile   Language Appropriate   Thought Process:  concrete, goal directed and logical   Thought Content:  normal   Perceptual Disturbances: None   Risk Potential: Suicidal Ideations none, Homicidal Ideations none and Potential for Aggression Yes due limited cognition   Sensorium:  person, place and time/date   Cognition:  recent and remote memory grossly intact   Consciousness:  alert    Attention: attention span appeared shorter than expected for age   Insight:  limited   Judgment: limited   Gait/Station: normal gait/station   Motor Activity: no abnormal movements     Progress Toward Goals: Progressing    Recommended Treatment: Continue with pharmacotherapy, group therapy, milieu therapy and occupational therapy
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
## 2021-03-21 NOTE — PROGRESS NOTES
Pt walking halls this morning  Pt asking about discharge  Expresses understanding that discharge is pending for Monday per physician  Pt states CM may call his mother to  pt  Cooperative and appropriate during interaction    Denies SI

## 2021-03-22 VITALS
BODY MASS INDEX: 31.9 KG/M2 | WEIGHT: 162.48 LBS | OXYGEN SATURATION: 96 % | HEART RATE: 94 BPM | RESPIRATION RATE: 18 BRPM | HEIGHT: 60 IN | TEMPERATURE: 97 F | DIASTOLIC BLOOD PRESSURE: 87 MMHG | SYSTOLIC BLOOD PRESSURE: 154 MMHG

## 2021-03-22 PROCEDURE — 99239 HOSP IP/OBS DSCHRG MGMT >30: CPT | Performed by: STUDENT IN AN ORGANIZED HEALTH CARE EDUCATION/TRAINING PROGRAM

## 2021-03-22 RX ADMIN — QUETIAPINE FUMARATE 400 MG: 200 TABLET ORAL at 09:07

## 2021-03-22 NOTE — DISCHARGE INSTR - OTHER ORDERS
[facility] is a confidential 7 days/week telephone support service manned by trained mental health consumers  [facility] operates daily but is not able to accept calls between 2AM-6AM    [facility] provides support, a listening ear and can provide information about available services  [facility] specializes in the concerns of mental health consumers, their families and friends  However, we are also here for anyone who has a mental health concern, is confused about or just doesn't know anything about mental health or where to get information  To reach [place], call [phone] accepts calls between 6:00 AM to 10:00 AM and from 4:00 PM to 12:00 AM    Text CONNECT to [phone] from anywhere in the [place], anytime, about any type of crisis  A live, trained Crisis Counselor receives the text and lets you know that they are here to listen  The volunteer Crisis Counselor will help you move from a hot moment to a cool moment  [facility] ([facility]) AT [facility] - licensed telephone and mobile crisis services that provide mental health assessments to all age groups regardless of income or insurance  [facility] operates 24-hour/7 days a week  [address] [place] [address] assists consumer who are experiencing a mental health emergency and lack the resources to assist themselves  Immediate intervention for suicidal and depressed individuals with home visits/outreach being top priority  Crisis can be contacted at [phone]  The [facility] ([facility]) offers various education & support groups for you & your family  For more information visit their website at   http://www [employer]/   Dial 2-1-1 to get connected/get help    Free, confidential information & referral available 24/7: Aging Services, Child & Youth Services, Counseling, Education/Training, Food/Shelter/Clothing, Health Services, Parenting, Substance Abuse, Support Groups, Volunteer Opportunities, & much more  Phone: 2-1-1 or [phone], Web: [email or web address] [email or web address] [facility], Email: [email or web address] [email or web address]            What you need to know about coronavirus disease 2019 (COVID-19)     What is coronavirus disease 2019 (COVID-19)? Coronavirus disease 2019 (COVID-19) is a respiratory illness that can spread from person to person  The virus that causes COVID-19 is a novel coronavirus that was first identified during an investigation into an outbreak in [place], [place]  Can people in the [place]  get COVID-19? Yes  COVID-19 is spreading from person to person in parts of the [facility]  Risk of infection with COVID-19 is higher for people who are close contacts of someone known to have COVID-19, for example healthcare workers, or household members  Other people at higher risk for infection are those who live in or have recently been in an area with ongoing spread of COVID-19  Learn more about places with ongoing spread at   [employer] tn  html#geographic  Have there been cases of COVID-19 in the [place] ?   Yes  The first case of COVID-19 in the [place] was reported on January 21, 2020  The current count of cases of COVID-19 in the [place] is available on [employer] at [facility]  How does COVID-19 spread? The virus that causes COVID-19 probably emerged from an animal source, but is now spreading from person to person  The virus is thought to spread mainly between people who are in close contact with one another (within about 6 feet) through respiratory droplets produced when an infected person coughs or sneezes  It also may be possible that a person can get COVID-19 by touching a surface or object that has the virus on it and then touching their own mouth, nose, or possibly their eyes, but this is not thought to be the main way the virus spreads   Learn what is known about the spread of newly emerged coronaviruses at [facility]  What are the symptoms of COVID-19? Patients with COVID-19 have had mild to severe respiratory illness with symptoms of   fever   cough   shortness of breath  What are severe complications from this virus? Some patients have pneumonia in both lungs, multi-organ failure and in some cases death  How can I help protect myself? People can help protect themselves from respiratory illness with everyday preventive actions  Avoid close contact with people who are sick  Avoid touching your eyes, nose, and mouth with unwashed hands  Wash your hands often with soap and water for at least 20 seconds  Use an alcohol-based hand  that contains at least 60% alcohol if soap and water are not available  If you are sick, to keep from spreading respiratory illness to others, you should   Stay home when you are sick  Cover your cough or sneeze with a tissue, then throw the tissue in the trash  Clean and disinfect frequently touched objectsand surfaces  What should I do if I recently traveled from an area with ongoing spread of COVID-19? If you have traveled from an affected area, there may be restrictions on your movements for up to 2 weeks  If you develop symptoms during that period (fever, cough, trouble breathing), seek medical advice  Call the office of your health care provider before you go, and tell them about your travel and your symptoms  They will give you instructions on how to get care without exposing other people to your illness  While sick, avoid contact with people, don't go out and delay any travel to reduce the possibility of spreading illness to others  Is there a vaccine? There is currently no vaccine to protect against COVID-19  The best way to prevent infection is to take everyday preventive actions, like avoiding close contact with people who are sick and washing your hands often  Is there a treatment? There is no specific antiviral treatment for COVID-19  People with COVID-19 can seek medical care to helprelieve symptoms  For more information: www cdc gov/BSGIE66JP 161350-K 03/03/2020       What to do if you are sick withcoronavirus disease 2019 (COVID-19)     If you are sick with COVID-19 or suspect you are infected with the virus that causes COVID-19, follow the steps below to help prevent the disease from spreading to people in your home and community  Stay home except to get medical care   You should restrict activities outside your home, except for getting medical care  Do not go to work, school, or public areas  Avoid using public transportation, ride-sharing, or taxis  Separate yourself from other people and animals inyour home  People: As much as possible, you should stay in a specific room and away from other people in your home  Also, you should use a separate bathroom, if available  Animals: Do not handle pets or other animals while sick  See COVID-19 and Animals for more information  Call ahead before visiting your doctor   If you have a medical appointment, call the healthcare provider and tell them that you have or may have COVID-19  This will help the healthcare provider's office take steps to keep other people from getting infected or exposed  Wear a facemask  You should wear a facemask when you are around other people (e g , sharing a room or vehicle) or pets and before you enter a healthcare provider's office  If you are not able to wear a facemask (for example, because it causes trouble breathing), then people who live with you should not stay in the same room with you, or they should wear a facemask if they enteryour room  Cover your coughs and sneezes   Cover your mouth and nose with a tissue when you cough or sneeze   Throw used tissues in a lined trash can; immediately wash your hands with soap and water for at least 20 seconds or clean your hands with an alcohol-based hand  that contains at least 60 to 95% alcohol, covering all surfaces of your hands and rubbing them together until they feel dry  Soap and water should be used preferentially if hands are visibly dirty  Avoid sharing personal household items   You should not share dishes, drinking glasses, cups, eating utensils, towels, or bedding with other people or pets in your home  After using these items, they should be washed thoroughly with soap and water  Clean your hands often  Wash your hands often with soap and water for at least 20 seconds  If soap and water are not available, clean your hands with an alcohol-based hand  that contains at least 60% alcohol, covering all surfaces of your hands and rubbing them together until they feel dry  Soap and water should be used preferentially if hands are visibly dirty  Avoid touching your eyes, nose, and mouth with unwashed hands  Clean all "high-touch" surfaces every day  High touch surfaces include counters, tabletops, doorknobs, bathroom fixtures, toilets, phones, keyboards, tablets, and bedside tables  Also, clean any surfaces that may have blood, stool, or body fluids on them  Use a household cleaning spray or wipe, according to the label instructions  Labels contain instructions for safe and effective use of the cleaning product including precautions you should take when applying the product, such as wearing gloves and making sure you have good ventilation during use of the product  Monitor your symptoms  Seek prompt medical attention if your illness is worsening (e g , difficulty breathing)  Before seeking care, call your healthcare provider and tell them that you have, or are being evaluated for, COVID-19  Put on a facemask before you enter the facility  These steps will help the healthcare provider's office to keep other people in the office or waiting room from getting infectedor exposed     Ask your healthcare provider to call the local or [facility] health department  Persons who are placed under active monitoring or facilitated self-monitoring should follow instructions provided by their local health department or occupational health professionals, as appropriate  If you have a medical emergency and need to call 911, notify the dispatch personnel that you have, or are being evaluated for COVID-19  If possible, put on a facemask before emergency medical services arrive  Discontinuing home isolation  Patients with confirmed COVID-19 should remain under home isolation precautions until the risk of secondary transmission to others is thought to be low  The decision to discontinue home isolation precautions should be made on a case-by-case basis, in consultation with healthcare providers and state and local health departments  For more information: www cdc gov/FYPCF43QA 639789-H 02/24/2020       Stay home when you are sick,except to get medical care  Wash your hands often with soap and water for at least 20 seconds  Cover your cough or sneeze with a tissue, then throw the tissue in the trash  Clean and disinfect frequently touched objects and surfaces  Avoid touching your eyes, nose, and mouth  STOP THE SPREAD OF GERMS  For more information: www cdc gov/COVID19 Avoid close contact with people who are sick  Help prevent the spread of respiratory diseases like COVID-19

## 2021-03-22 NOTE — NURSING NOTE
AVS discharge instructions reviewed with patient, pt denies any concerns or questions and expresses readiness for discharge  Patient discharged from the unit pleasant and calm

## 2021-03-22 NOTE — CASE MANAGEMENT
CM called Woody Pearson Behavior Therapist - [facility] ([phone])  He reported that he spoke to pt family and they informed him that pt was hospitalized  He reported that pt had an appointment scheduled with him today but he will reschedule for Friday 3/26/2021  Ken John reported that he will call pt and family to coordinate time

## 2021-03-22 NOTE — CASE MANAGEMENT
CM met with pt to confirm dc plans for today  Pt is aware that his mom or one of his caretakers will pick him up around 1pm and they will take him directly to [facility] to get his INVEGA injection at 2:30pm      CM informed pt that his therapist Ishaan Snow will call him to schedule his appointment for later this week  Pt verbalized understanding  Pt spoke about his hospitalization and he reported "I was just jaywalking, I was not trying to kill myself  [employer] all know me and they asked me if I was ok and I told them I am going through a lot "     Pt spoke about his dad who he reported he has not seen since he was 1years old  Pt reported that his father lives in [place] and he recently saw him for the first time as an adult  Pt reported that his brother went to visit their dad and he asked him if we wanted to go  Pt spoke to his mom about visiting his dad and she reported that he could go but she told pt he would need to buy his own ticket to get to [facility]  CM and pt spoke about his birthday tomorrow and he reported "it is birthday week " Pt reported his favorite food is [employer] and he hopes to go out to eat for his birthday  Pt reporting feeling ready for dc to his apartment today

## 2021-03-22 NOTE — CASE MANAGEMENT
ARLIN called pt mom to check in and provide update on pt status  She reported that she spoke to pt on Friday and "he seemed like he was doing well, he goes out with his brother and his friends on Fridays and then I got a call that he was in the hospital "     Mom confirmed that pt has a great support system and has 24 hour care and does well  She reported that pt will be moving to a new apartment soon and they are working on helping him with that  Mom also confirmed "he has an amazing Behavior Therapist Berta Cole "     Mom reported that pt had difficulty living in group homes in the past and living in an apartment with staff has been better for him  CM informed mom of pt rescheduled appointment at [facility] for Thursday 3/25/2021  CM informed mom that CM has a call into [facility] regarding his INVEGA injection that is due today and will call her back to let her know if they can still administer it for him today  Mom reported that she will be able to pick pt up today and CM will inform her of when [facility] calls back for status of Invega

## 2021-03-22 NOTE — CASE MANAGEMENT
CM called [facility] ([phone]) to see if pt has an upcoming appointment scheduled with Psychiatrist Dr Ang Estevez  They reported that pt had appointment today at [address] [facility], they were able to reschedule pt for PHONE appointment on Thursday 3/25/2021 at [address] asked about pt INVEGA injection that is due today  CM was transferred to Nurses voicemail and requested a call back to see if they can still administer pt Nellie Main today since his appointment is not until 3/25/2021

## 2021-03-22 NOTE — BH TRANSITION RECORD
Contact Information: If you have any questions, concerns, pended studies, tests and/or procedures, or emergencies regarding your inpatient behavioral health visit  Please contact [address] [place] [address] behavioral health unit [phone] and ask to speak to a , nurse or physician  A contact is available 24 hours/ 7 days a week at this number  Summary of Procedures Performed During your Stay:  Below is a list of major procedures performed during your hospital stay and a summary of results:  - No major procedures performed  Pending Studies (From admission, onward)    None        If studies are pending at discharge, follow up with your PCP and/or referring provider

## 2021-03-22 NOTE — PROGRESS NOTES
Pt remains calm and cooperative  Denies SI/HI, anxiety and depression  Reports he feels ready for discharge  Spoke about supports and healthy coping skills  Pt reports he slept well overnight and denies any concerns

## 2021-03-22 NOTE — PROGRESS NOTES
Diagnosis of  Bipolar disorder reviewed  Short term goals for decrease in depressive symptoms, decrease in anxiety symptoms, decrease in suicidal thoughts, decrease in self abusive behaviors discussed  Pt signed 72 hour notice on 3/20/2021 @ 0800     Pt will dc to his apartment today  Pt mom or caregiver will pick him up around 1pm, and go right to [facility] at 2:30pm for his INVEGA injection as he was to have an appt this morning at 9am      All parties in agreement and treatment plan signed        03/22/21 1228   Team Meeting   Meeting Type Tx Team Meeting   Team Members Present   Team Members Present Physician;Nurse;   Physician Team Member Dr Lara Mahoney Team Member [facility] Team Member Elizabeth   Patient/Family Present   Patient Present Yes   Patient's Family Present No

## 2021-03-22 NOTE — PROGRESS NOTES
[facility] - [facility] [place]  Pt has Autism and Mild ID  [place] [employer] found pt running in and out of traffic and brought him to ED  Pt reported that he was sad and lonely and has a lot going on  Pt was calm and cooperative upon admission  Pt is Hard of hearing but signs and can read lips  Pt lives in an apartment alone  PRN: Motrin     Pt signed 72 hour notice on 3/20/2021 at 0800     Pt will dc today 3/22/2021     CM will confirm OP appointments and if family can pick him up       03/22/21 0813   Team Meeting   Meeting Type Daily Rounds   Team Members Present   Team Members Present Physician;Nurse;;Occupational Therapist   Physician Team Member Dr Mirian Denny / Terrie Tay / Pvael Vazquez Team Member Bianca Saeed / Zora Irvin Management Team Member Kendra Fields / Poli Mckeon   OT Team Member Alma Delia Garnica / Marion Tidwell Student   Patient/Family Present   Patient Present No   Patient's Family Present No

## 2021-03-22 NOTE — DISCHARGE SUMMARY
Discharge Summary - Jagdish [age] y o  male MRN: [id number]  Unit/Bed#: Efraín Welsh 167-27 Encounter: [id number]     Admission Date:   Admission Orders (From admission, onward)     Ordered        03/20/21 0248  ED TO DIFFERENT CAMPUS IP [facility] UNIT or INPATIENT MEDICAL UNIT to  Birssa Galvan 82 (using Discharge Readmit Navigator) - Admit Patient to [address] [place] [address]  Once                         Discharge Date: 3/22/2021    Attending Psychiatrist: Nabila Bermudez MD    Reason for Admission/HPI:   History of Present Illness   Patient is a 26-year-old intellectually disabled male who presented to [facility] ED by police due to suicidal behavior where he was seen running inside traffic  Apparently he told police and nursing staff that he was suicidal but denied suicidal thoughts to provider  When asked why he ran into traffic patient reported that he was frustrated and just wanted to get away  He did confirm that he was taking his psychiatric medications at that time  During crisis evaluation patient denied suicidal thoughts but did report that he was sad, lonely, and frustrated  He stated that he is going through a lot and just wanted someone to be his friend  He stated he was running in and out of traffic out of frustration, expressed guilt, and said he would not do it again  He did report hearing auditory hallucinations of voices  He denied other forms of psychosis  He was overall deemed a poor historian  Patient's family was contacted where they gave further history  Apparently patient has contract community support staff but patient has 2 hours a day of alone time  Apparently he went for a walk when alone, did not answer his phone, which was unusual behavior  His mother reported that he had been increasingly anxious recently and this happens when he is due for his [place] [place] IM injection    On initial psychiatric evaluation patient stated that he was frustrated and depressed because he does not have any friends to talk to  He stated that he should have walked on the side of the road and denied having any suicidal thoughts  He stated he felt better at that time and wanted to get home for his birthday  He denied all symptoms of depression and anxiety  He did not endorse criteria for margarito  He does have history of manic episodes  He denied psychotic symptoms at that time    He denied delusional material   Patient has previous inpatient psychiatric hospitalizations, denied previous suicide attempt, and does have current outpatient psychiatrist     Psychosocial Stressors: social    Hospital Course:   Behavioral Health Medications:   current meds:   Current Facility-Administered Medications   Medication Dose Route Frequency    acetaminophen (TYLENOL) tablet 650 mg  650 mg Oral Q6H PRN    aluminum-magnesium hydroxide-simethicone (MYLANTA) oral suspension 30 mL  30 mL Oral Q4H PRN    artificial tear (LUBRIFRESH P M ) ophthalmic ointment 1 application  1 application Both Eyes V2L PRN    haloperidol lactate (HALDOL) injection 2 5 mg  2 5 mg Intramuscular Q4H PRN Max 4/day    And    LORazepam (ATIVAN) injection 1 mg  1 mg Intramuscular Q4H PRN Max 4/day    And    benztropine (COGENTIN) injection 0 5 mg  0 5 mg Intramuscular Q4H PRN Max 4/day    haloperidol lactate (HALDOL) injection 5 mg  5 mg Intramuscular Q4H PRN Max 4/day    And    LORazepam (ATIVAN) injection 2 mg  2 mg Intramuscular Q4H PRN Max 4/day    And    benztropine (COGENTIN) injection 1 mg  1 mg Intramuscular Q4H PRN Max 4/day    benztropine (COGENTIN) injection 1 mg  1 mg Intramuscular Q4H PRN Max 6/day    benztropine (COGENTIN) tablet 1 mg  1 mg Oral Q4H PRN Max 6/day    bisacodyl (DULCOLAX) rectal suppository 10 mg  10 mg Rectal Daily PRN    divalproex sodium (DEPAKOTE ER) 24 hr tablet 1,000 mg  1,000 mg Oral HS    hydrOXYzine HCL (ATARAX) tablet 25 mg  25 mg Oral Q6H PRN Max 4/day    hydrOXYzine HCL (ATARAX) tablet 50 mg  50 mg Oral Q4H PRN Max 4/day    Or    LORazepam (ATIVAN) injection 1 mg  1 mg Intramuscular Q4H PRN    ibuprofen (MOTRIN) tablet 400 mg  400 mg Oral Q6H PRN    ibuprofen (MOTRIN) tablet 600 mg  600 mg Oral Q8H PRN    LORazepam (ATIVAN) tablet 1 mg  1 mg Oral Q4H PRN Max 6/day    Or    LORazepam (ATIVAN) injection 2 mg  2 mg Intramuscular Q6H PRN Max 3/day    LORazepam (ATIVAN) tablet 0 5 mg  0 5 mg Oral Q6H PRN    melatonin tablet 6 mg  6 mg Oral HS    nicotine polacrilex (NICORETTE) gum 2 mg  2 mg Oral Q2H PRN    polyethylene glycol (MIRALAX) packet 17 g  17 g Oral Daily PRN    QUEtiapine (SEROquel) tablet 400 mg  400 mg Oral HS    QUEtiapine (SEROquel) tablet 400 mg  400 mg Oral Daily    risperiDONE (RisperDAL M-TABS) dispersible tablet 0 5 mg  0 5 mg Oral Q4H PRN Max 3/day    risperiDONE (RisperDAL M-TABS) dispersible tablet 1 mg  1 mg Oral Q4H PRN Max 6/day    risperiDONE (RisperDAL) tablet 0 25 mg  0 25 mg Oral Q4H PRN Max 6/day    senna-docusate sodium (SENOKOT S) 8 6-50 mg per tablet 1 tablet  1 tablet Oral Daily PRN       Patient was admitted to [address] [place] [address] inpatient psychiatric unit on voluntary 201 commitment for safety and stabilization  On admission patient was decreased on Depakote ER to 1000mg HS for mood stabilization, continued on Seroquel 400mg BID for mood stabilization, and continued on Melatonin 6mg HS for insomnia  He tolerated medications with no acute side effects  He signed 72 hour notice shortly after arrival   He did not display 302 behavior  Case management collaborated with outpatient provider and it was set up on day of discharge for his continued [place] IM injection  His mood brightened over the course of his treatment, and appeared future oriented  He did not demonstrate dangerous behavior to self or others during his inpatient stay    On day of discharge patient had reduced depression, controllable anxiety, denied psychosis, did not show signs of margarito, and denied suicidal/homicidal ideations  Mental Status at time of Discharge:     Appearance:  casually dressed   Behavior:  cooperative   Speech:  normal pitch and normal volume   Mood:  euthymic   Affect:  mood-congruent   Thought Process:  concrete   Thought Content:  normal   Perceptual Disturbances: None   Risk Potential: Denied SI/HI  Potential for aggression: No   Sensorium:  person, place and time/date   Cognition:  recent and remote memory grossly intact   Consciousness:  alert and awake    Attention: attention span appeared shorter than expected for age   Insight:  limited   Judgment: improved   Gait/Station: normal gait/station and normal balance   Motor Activity: no abnormal movements       Discharge Diagnosis:   Bipolar disorder       Discharge Medications:  See after visit summary for reconciled discharge medications provided to patient and family  Discharge instructions/Information to patient and family:   See after visit summary for information provided to patient and family  Provisions for Follow-Up Care:  See after visit summary for information related to follow-up care and any pertinent home health orders  Discharge Statement   I spent 35 minutes discharging the patient  This time was spent on the day of discharge  I had direct contact with the patient on the day of discharge  On day of discharge patient had mental status exam performed, discharge instructions/medications reviewed, and outpatient planning discussed  He was given no scripts  He denied tobacco cessation therapy after discharge      Tomy Morelos PA-C

## 2021-03-22 NOTE — CASE MANAGEMENT
CM met with pt to complete intake and sign AGUSTIN for dc planning  Pt was admitted on: Saturday 3/20/2021 @ 2:39am from [facility] ED  Pt signed 72 hour notice on 3/20/2021 @0800    Pt will dc today 3/22/2021    Reasons for admission/stressors:  ED Note: 3/19/2021 "30y  o male with PMH of asthma, autism, bipolar, depression and schizoaffective schizophrenia presents to the ER for psychiatric evaluation  Per [employer], patient was pulled out of traffic by them  The patient stated he wanted to die so he ran into traffic  Patient told [employer] and nursing staff that he was suicidal but denied it to me  When asked why he ran into traffic, patient states he was frustrated and just wanted to get away  He denies HI  He reports auditory hallucinations but denies visual hallucinations  He follows with a psychiatrist and therapist at [facility] twice a week  He reports taking his medications as prescribed  He has been admitted previously to a behavioral health unit  He denies drug or alcohol use but does admit to smoking cigarettes occasionally  He reports that he lives alone in an apartment by [employer]  He denies fever, chills, URI symptoms, chest pain, dyspnea, N/V/D, abdominal pain, weakness or paresthesias "    At time of intake: Pt was alert and oriented, walking the halls  Pt reported that he lives in [facility] in an apartment alone  Pt reported "I have a lot of family " Pt spoke about his birthday being tomorrow [birth year] and he will turn 27  Pt reported that he enjoys taking walks, drawing and spending time with his brother, friends and family  Outpatient Services: Pt sees Dr Leta Tafoya at [facility] and has a Behavior Therapist ([phone])     Current medications: Depakote ER, Melatonin, Seroquel, INVEGA Injection Due 3/22/2021    D&A Resources/Rehab?: N/A    UDS: N/A  BAT: N/A    ICM: Pt has a ID Supports Coordinator at [employer], and has 24 hour support at his apartment       Prior IP Treatment: Pt has been hospitalized in the past, he is unsure of when  Medical Concerns: None     Insurance: [facility]     Legal Issues: Denies     Access to firearms: Denies     Emergency Contacts/Supports: Mom Gia Sierra, Brother Cecy Payan     Coping Skills/Interests: Pt reported that he enjoys being outside, spending time with family and friends and enjoys drawing  Transportation at DC: Pt mom will pick him up today 3/22/2021       AGUSTIN signed for:   [address] [place] [address],[address] - Therapist [facility]

## 2021-03-22 NOTE — DISCHARGE INSTR - APPOINTMENTS
Anne Peoples RN, our [email or web address], will be calling you after your discharge, on the phone number that you provided  She will be available as an additional support, if needed  If you wish to speak with her, you may contact Landry Goodrich at [phone]

## 2021-03-22 NOTE — DISCHARGE INSTRUCTIONS
Bipolar Disorder   WHAT YOU SHOULD KNOW:   Bipolar disorder is a long-term chemical imbalance that causes rapid changes in mood and behavior  High moods are called margarito  Low moods are called depression  Sometimes you will feel manic and sometimes you will feel depressed  You can have margarito and depression at the same time  This is called a mixed bipolar state  CARE AGREEMENT:   You have the right to help plan your care  Learn about your health condition and how it may be treated  Discuss treatment options with your caregivers to decide what care you want to receive  You always have the right to refuse treatment  RISKS:   The periods of margarito and depression may last for weeks or months  Without treatment, your bipolar disorder could get worse  Your illness could make it hard to work and get along with others  It may also affect your eating and sleeping  WHILE YOU ARE HERE:   What caregivers will I work with while being treated for bipolar disorder? · Psychiatrist: This is a medical doctor who works in mental health  The psychiatrist is in charge of ordering your medicine  You may work closely with this doctor and other caregivers  · Therapist: This is a caregiver that works closely with you while you are being treated  This person may be a doctor, psychologist, nurse, mental health counselor, or   What meetings might I need to attend? · Family meetings: Your caregivers will meet with you and your family  You will talk about how to cope with your illness  · Group therapy: A series of meetings that you attend with other patients and staff  During these meetings, patients and staff talk together about ways to cope with illness  · Individual therapy: A time for you to meet alone with your therapist  During this time you and your therapist may talk about how to cope with your illness      · Intensive outpatient program: This is when you come to the hospital or clinic for 1 to 3 hours of treatment  This program is usually 2 to 5 times a week for a short period of time  · Partial care program: This is when you come to the hospital unit every day during the day or evening  After you are treated each day, you can return home  You may need a partial care program after you have been treated in the hospital  Caregivers may also suggest this program instead of having you stay in the hospital   What other treatments may be included in my treatment plan? · Quiet room: This is an empty room used for patients who need to have time out in a safe place  You may be put here if caregivers are concerned you may hurt yourself or others  · Restraints: There are 2 types of restraints that may be used while you are in the hospital  They will only be used if caregivers feel you are in danger of hurting yourself or others  Physical restraints may be put on your wrists or ankles and tied to something else  These are usually cloth or leather bands  Other things will always be tried before using physical restraints, such as going into a quiet room or seclusion  Caregivers may use chemical restraints instead  This is medicine used to help you gain control of your actions and help you relax  Restraints should never be used to punish you  · Seclusion: This is when you need to be locked in a safe room because you are out of control  The door is locked because you might want to leave the room  Caregivers will closely watch you while you are in seclusion  You may come out of seclusion when caregivers feel you will not hurt yourself or others  · Time out: This is time spent away from other people  This is usually needed when you are not able to control your behavior  You may be put in time out if your behavior is affecting others  Time out may be in your room or another room  · Electroconvulsive therapy: This is also called ECT   It may be used for patients with life threatening depression that medicine or therapy has not helped  With ECT, a small amount of electric shock is sent through the brain  Before ECT treatment, you may get medicine to help you relax  After treatment, you may have trouble remembering things for a short time  What else should I know about being treated for bipolar disorder? · 72-hour hold: This is when you are put in the hospital for 72 hours without your permission  The police or a caregiver may decide to put you in the hospital  This may only be done if others are concerned that you may hurt yourself or someone else  It may also be done if caregivers or police do not think you can safely care for yourself  · Clothes: You may wear your own clothes while you are in the hospital     · Inpatient unit: This is the place where you will stay while in the hospital  It usually has bedrooms and a living area  Sometimes the doors of this unit are locked  · Meals: You will eat your meals on the unit or in the cafeteria with other patients  · Personal belongings: When you are admitted to the unit, caregivers may search your belongings  Any belongings brought to you during your stay may also be searched  This search is done to keep you and the staff safe  · Release of information form: This is a legal paper that lets caregivers share information with those listed on this form  You will need to sign this form before any information will be released to persons outside the hospital     · Right to privacy: Information that you share with your caregivers will be kept private among hospital caregivers  They will not share information with others without your permission  · Sharps: You will not be allowed to keep any sharp items with you  Sharp items include scissors, nail files, razors, or glass  Ask a caregiver if you need to use one of these items  © 2014 [employer] is for End User's use only and may not be sold, redistributed or otherwise used for commercial purposes  All illustrations and images included in CareNotes® are the copyrighted property of A D A M [employer] Inc  or [employer]  The above information is an  only  It is not intended as medical advice for individual conditions or treatments  Talk to your doctor, nurse or pharmacist before following any medical regimen to see if it is safe and effective for you

## 2021-07-19 ENCOUNTER — APPOINTMENT (EMERGENCY)
Dept: RADIOLOGY | Facility: HOSPITAL | Age: 31
End: 2021-07-19
Payer: COMMERCIAL

## 2021-07-19 ENCOUNTER — HOSPITAL ENCOUNTER (EMERGENCY)
Facility: HOSPITAL | Age: 31
Discharge: HOME | End: 2021-07-19
Attending: EMERGENCY MEDICINE
Payer: COMMERCIAL

## 2021-07-19 VITALS
WEIGHT: 162.48 LBS | TEMPERATURE: 96.8 F | HEART RATE: 104 BPM | SYSTOLIC BLOOD PRESSURE: 158 MMHG | RESPIRATION RATE: 16 BRPM | OXYGEN SATURATION: 99 % | BODY MASS INDEX: 31.73 KG/M2 | DIASTOLIC BLOOD PRESSURE: 105 MMHG

## 2021-07-19 DIAGNOSIS — M25.539 WRIST PAIN: Primary | ICD-10-CM

## 2021-07-19 PROCEDURE — 73130 X-RAY EXAM OF HAND: CPT

## 2021-07-19 PROCEDURE — 73110 X-RAY EXAM OF WRIST: CPT

## 2021-07-19 PROCEDURE — 99284 EMERGENCY DEPT VISIT MOD MDM: CPT | Performed by: EMERGENCY MEDICINE

## 2021-07-19 PROCEDURE — 99283 EMERGENCY DEPT VISIT LOW MDM: CPT

## 2021-07-19 RX ORDER — IBUPROFEN 600 MG/1
600 TABLET ORAL ONCE
Status: COMPLETED | OUTPATIENT
Start: 2021-07-19 | End: 2021-07-19

## 2021-07-19 RX ADMIN — IBUPROFEN 600 MG: 600 TABLET, FILM COATED ORAL at 19:54

## 2021-07-19 NOTE — ED PROVIDER NOTES
History  Chief Complaint   Patient presents with    Wrist Injury     R wrist and thumb  Pt states he was playing with his friend and fell on his R side  56-year-old gentleman presents with complaint of right hand and wrist pain  He states that he was rough-housing with a friend and fell onto the ground bracing his fall with his right upper extremity  He has pain primarily across the right thumb and radial aspect of the right wrist   He is right-hand dominant and did not take any medications for the pain  He denies numbness, weakness, tingling or other acute injuries related to this episode  Hand Injury  Location:  Wrist and finger  Wrist location:  R wrist  Finger location:  R thumb  Injury: yes    Mechanism of injury: fall    Fall:     Impact surface:  [place]    Point of impact:  Outstretched arms  Pain details:     Quality:  Dull    Radiates to:  Does not radiate    Severity:  Mild    Onset quality:  Sudden    Duration: minutes  Timing:  Constant    Progression:  Partially resolved  Handedness:  Right-handed  Dislocation: no    Prior injury to area:  No  Relieved by:  Nothing  Worsened by:  Nothing  Ineffective treatments:  None tried  Associated symptoms: no decreased range of motion, no fatigue, no muscle weakness, no neck pain, no numbness, no stiffness, no swelling and no tingling        Prior to Admission Medications   Prescriptions Last Dose Informant Patient Reported? Taking?    Melatonin ER 10 MG TBCR   Yes No   Sig: Take 10 mg by mouth   QUEtiapine (SEROquel) 400 MG tablet   Yes No   Sig: Take 400 mg by mouth 2 (two) times a day    albuterol (PROVENTIL HFA,VENTOLIN HFA) 90 mcg/act inhaler   No No   Sig: Inhale 1-2 puffs every 6 (six) hours as needed for wheezing   divalproex sodium (DEPAKOTE ER) 500 mg 24 hr tablet   Yes No   Sig: Take 1,000 mg by mouth daily XR formula      Facility-Administered Medications: None       Past Medical History:   Diagnosis Date    Asthma     Autism spectrum disorder     Bipolar 1 disorder (HCC)     Cognitive impairment     Depression     Intellectual functioning disability     Intestinal disaccharidase deficiency     Mood swings     Psychiatric illness     Schizo-affective schizophrenia ([address] )     Self-injurious behavior        Past Surgical History:   Procedure Laterality Date    FRACTURE SURGERY      SHOULDER SURGERY         History reviewed  No pertinent family history  I have reviewed and agree with the history as documented  E-Cigarette/Vaping    E-Cigarette Use Never User      E-Cigarette/Vaping Substances     Social History     Tobacco Use    Smoking status: Current Every Day Smoker     Packs/day: 0 25     Types: Cigarettes    Smokeless tobacco: Never Used   Vaping Use    Vaping Use: Never used   Substance Use Topics    Alcohol use: No    Drug use: Not Currently     Types: "Crack" cocaine, Cocaine       Review of Systems   Constitutional: Negative for fatigue  Musculoskeletal: Positive for arthralgias  Negative for joint swelling, neck pain and stiffness  Skin: Negative for color change, pallor, rash and wound  Neurological: Negative for weakness and numbness  Hematological: Does not bruise/bleed easily  All other systems reviewed and are negative  Physical Exam  Physical Exam  Vitals and nursing note reviewed  Constitutional:       General: He is not in acute distress  Appearance: Normal appearance  He is well-developed  He is not ill-appearing, toxic-appearing or diaphoretic  HENT:      Head: Normocephalic and atraumatic  Right Ear: External ear normal       Left Ear: External ear normal       Nose: Nose normal       Mouth/Throat:      Mouth: Mucous membranes are moist       Pharynx: Oropharynx is clear  Eyes:      General: No scleral icterus  Conjunctiva/sclera: Conjunctivae normal       Pupils: Pupils are equal, round, and reactive to light  Cardiovascular:      Pulses: Normal pulses  Pulmonary:      Effort: Pulmonary effort is normal  No respiratory distress  Musculoskeletal:         General: No swelling or deformity  Left wrist: No swelling, deformity, effusion or snuff box tenderness  Normal range of motion  Normal pulse  Hands:       Cervical back: Normal range of motion and neck supple  Comments: No abrasion or external evidence of injury  Skin:     General: Skin is warm and dry  Capillary Refill: Capillary refill takes less than 2 seconds  Neurological:      General: No focal deficit present  Mental Status: He is alert and oriented to person, place, and time  Sensory: No sensory deficit  Motor: No weakness  Psychiatric:         Mood and Affect: Mood normal          Behavior: Behavior normal          Vital Signs  ED Triage Vitals   Temperature Pulse Respirations Blood Pressure SpO2   07/19/21 1924 07/19/21 1924 07/19/21 1924 07/19/21 1924 07/19/21 1924   (!) 96 8 °F (36 °C) 104 16 (!) 158/105 99 %      Temp Source Heart Rate Source Patient Position - Orthostatic VS BP Location FiO2 (%)   07/19/21 1924 07/19/21 1924 07/19/21 1924 07/19/21 1924 --   Tympanic Monitor Lying Right arm       Pain Score       07/19/21 1954       9           Vitals:    07/19/21 1924   BP: (!) 158/105   Pulse: 104   Patient Position - Orthostatic VS: Lying         Visual Acuity      ED Medications  Medications   ibuprofen (MOTRIN) tablet 600 mg (600 mg Oral Given 7/19/21 1954)       Diagnostic Studies  Results Reviewed     None                 XR hand 3+ views RIGHT    (Results Pending)   XR wrist 3+ views RIGHT    (Results Pending)              Procedures  Procedures         ED Course                             SBIRT 22yo+      Most Recent Value   SBIRT (23 yo +)   In order to provide better care to our patients, we are screening all of our patients for alcohol and drug use  Would it be okay to ask you these screening questions?   Unable to answer at this time Filed at: 07/19/2021 1955                    MDM    Disposition  Final diagnoses:   Wrist pain     Time reflects when diagnosis was documented in both MDM as applicable and the Disposition within this note     Time User Action Codes Description Comment    7/19/2021  7:50 PM Rosenda Serra Add [M25 539] Wrist pain       ED Disposition     ED Disposition Condition Date/Time Comment    Discharge Stable Mon Jul 19, 2021  7:50 PM Misha Espinal discharge to home/self care  Follow-up Information     Follow up With Specialties Details Why Contact Kale Foy, [facility] Call   [address] [facility] [address]  [phone]            Discharge Medication List as of 7/19/2021  8:01 PM      CONTINUE these medications which have NOT CHANGED    Details   albuterol (PROVENTIL HFA,VENTOLIN HFA) 90 mcg/act inhaler Inhale 1-2 puffs every 6 (six) hours as needed for wheezing, Starting Wed 12/2/2020, Print      divalproex sodium (DEPAKOTE ER) 500 mg 24 hr tablet Take 1,000 mg by mouth daily XR formula, Starting Wed 4/4/2012, Historical Med      Melatonin ER 10 MG TBCR Take 10 mg by mouth, Historical Med      QUEtiapine (SEROquel) 400 MG tablet Take 400 mg by mouth 2 (two) times a day , Historical Med           No discharge procedures on file      PDMP Review     None          ED Provider  Electronically Signed by           Danyel Garcia DO  07/19/21 0880

## 2021-11-21 ENCOUNTER — HOSPITAL ENCOUNTER (EMERGENCY)
Facility: HOSPITAL | Age: 31
Discharge: HOME | End: 2021-11-21
Attending: EMERGENCY MEDICINE
Payer: COMMERCIAL

## 2021-11-21 VITALS
HEART RATE: 101 BPM | DIASTOLIC BLOOD PRESSURE: 91 MMHG | BODY MASS INDEX: 32.51 KG/M2 | TEMPERATURE: 97.7 F | RESPIRATION RATE: 20 BRPM | WEIGHT: 166.45 LBS | OXYGEN SATURATION: 97 % | SYSTOLIC BLOOD PRESSURE: 157 MMHG

## 2021-11-21 DIAGNOSIS — J45.901 ASTHMA EXACERBATION: Primary | ICD-10-CM

## 2021-11-21 PROCEDURE — 99284 EMERGENCY DEPT VISIT MOD MDM: CPT | Performed by: PHYSICIAN ASSISTANT

## 2021-11-21 PROCEDURE — 94640 AIRWAY INHALATION TREATMENT: CPT

## 2021-11-21 PROCEDURE — 99283 EMERGENCY DEPT VISIT LOW MDM: CPT

## 2021-11-21 RX ORDER — PREDNISONE 20 MG/1
60 TABLET ORAL DAILY
Qty: 15 TABLET | Refills: 0 | Status: SHIPPED | OUTPATIENT
Start: 2021-11-21 | End: 2021-11-26

## 2021-11-21 RX ORDER — ALBUTEROL SULFATE 2.5 MG/3ML
2.5 SOLUTION RESPIRATORY (INHALATION) EVERY 6 HOURS PRN
Qty: 75 ML | Refills: 0 | Status: SHIPPED | OUTPATIENT
Start: 2021-11-21

## 2021-11-21 RX ORDER — ALBUTEROL SULFATE 90 UG/1
2 AEROSOL, METERED RESPIRATORY (INHALATION) ONCE
Status: COMPLETED | OUTPATIENT
Start: 2021-11-21 | End: 2021-11-21

## 2021-11-21 RX ORDER — IPRATROPIUM BROMIDE AND ALBUTEROL SULFATE 2.5; .5 MG/3ML; MG/3ML
3 SOLUTION RESPIRATORY (INHALATION)
Status: DISCONTINUED | OUTPATIENT
Start: 2021-11-21 | End: 2021-11-21 | Stop reason: HOSPADM

## 2021-11-21 RX ADMIN — ALBUTEROL SULFATE 2 PUFF: 90 AEROSOL, METERED RESPIRATORY (INHALATION) at 03:06

## 2021-11-21 RX ADMIN — IPRATROPIUM BROMIDE AND ALBUTEROL SULFATE 3 ML: 2.5; .5 SOLUTION RESPIRATORY (INHALATION) at 03:06

## 2022-02-22 ENCOUNTER — PATIENT OUTREACH (OUTPATIENT)
Dept: OTHER | Facility: OTHER | Age: 32
End: 2022-02-22

## 2022-02-23 ENCOUNTER — DOCUMENTATION (OUTPATIENT)
Dept: INTERNAL MEDICINE CLINIC | Facility: OTHER | Age: 32
End: 2022-02-23

## 2022-02-23 ENCOUNTER — PATIENT OUTREACH (OUTPATIENT)
Dept: OTHER | Facility: OTHER | Age: 32
End: 2022-02-23

## 2022-02-23 DIAGNOSIS — L30.9 DERMATITIS: Primary | ICD-10-CM

## 2022-02-23 RX ORDER — DIAPER,BRIEF,INFANT-TODD,DISP
1 EACH MISCELLANEOUS 2 TIMES DAILY
Qty: 6 G | Refills: 0 | Status: SHIPPED | OUTPATIENT
Start: 2022-02-23 | End: 2022-02-26

## 2022-02-25 ENCOUNTER — DOCUMENTATION (OUTPATIENT)
Dept: INTERNAL MEDICINE CLINIC | Facility: OTHER | Age: 32
End: 2022-02-25

## 2022-02-25 NOTE — PROGRESS NOTES
Pt examined at [employer] with [facility]  Pt is a 26 yo male with PMH significant for bipolar and autism spectrum disorder who is currently homeless  Notes he had been living with his sister in [place], who has just been incarcerated  He slept outside last night  He awoke with a rash on his left hand  He notes the hand is itchy, circular  Denies any known bug bites  No injections/trauma  Denies any rash anywhere else  Denies any other symptoms  No h/o sensitivity to detergents etc     PE:  Skin: left hand- small, circular pink papule  No puncture eugene  No other rash noted  A/p:  1- probable bug bite:  One, discrete lesion, pruritic  D/w pt anticipate it will self resolve  If worsens could represent contact dermatitis and might benefit from steroid cream   2-homelessness:  Pt intends to sleep at the [address] [place] [address]  Goes to [employer] during the day

## 2022-02-25 NOTE — PROGRESS NOTES
Wednesday 2/23/22    [facility]AN[facility] Nurses encountered client at [employer] ([address]) during outreach hours  Client requested [place] Nurse (PN) to assess a 'nickel'  size red eugene on his dorsal right hand, of the metacarpal shaft of index finger  Dr Gerry Lackey provider, had assessed hand yesterday during [facility] [employer]  Dr James Andrews made aware client continues to c/o red area itching  Offers no other complaints  Hydrocortisone ointment prescription called in to pharmacy requested by client  Client pleasant in this encounter  Emotional support given

## 2022-02-28 NOTE — PROGRESS NOTES
Tuesday February 22, 2022    Client requested to be seen in the [facility] during the [employer] on [place]  Client presents with a nickel size red area on metacarpal shaft of index finger  Offers no other complaints

## 2022-04-28 NOTE — PLAN OF CARE
Problem: SELF HARM/SUICIDALITY  Goal: Will have no self-injury during hospital stay  Description: INTERVENTIONS:  - Q 15 MINUTES: Routine safety checks  - Q WAKING SHIFT & PRN: Assess risk to determine if routine checks are adequate to maintain patient safety  - Encourage patient to participate actively in care by formulating a plan to combat response to suicidal ideation, identify supports and resources  Outcome: Progressing     Problem: DEPRESSION  Goal: Will be euthymic at discharge  Description: INTERVENTIONS:  - Administer medication as ordered  - Provide emotional support via 1:1 interaction with staff  - Encourage involvement in milieu/groups/activities  - Monitor for social isolation  Outcome: Progressing     Problem: Ineffective Coping  Goal: Participates in unit activities  Description: Interventions:  - Provide therapeutic environment   - Provide required programming   - Redirect inappropriate behaviors   Outcome: Not Progressing Shaun Rodriguez is a 47 year old male presenting for BP F/U.     Medications Reviewed.     Pharmacy Verified.    Denies known Latex allergy or symptoms of Latex sensitivity.    Health Maintenance Due   Topic Date Due   • Colorectal Cancer Screen-  Never done   • COVID-19 Vaccine (3 - Booster for Moderna series) 12/03/2021       Patient is due for topics as listed above but is not proceeding with Colorectal Cancer Screening: Colonoscopy at this time. Patient refuses to order at this time.          Patient would like communication of their results via:      [facility]    Cell Phone:   Telephone Information:   Mobile [phone]     Okay to leave a message containing results? Yes

## 2022-05-03 ENCOUNTER — APPOINTMENT (EMERGENCY)
Dept: CT IMAGING | Facility: HOSPITAL | Age: 32
End: 2022-05-03
Payer: COMMERCIAL

## 2022-05-03 ENCOUNTER — HOSPITAL ENCOUNTER (EMERGENCY)
Facility: HOSPITAL | Age: 32
Discharge: HOME | End: 2022-05-03
Attending: EMERGENCY MEDICINE | Admitting: EMERGENCY MEDICINE
Payer: COMMERCIAL

## 2022-05-03 ENCOUNTER — APPOINTMENT (EMERGENCY)
Dept: RADIOLOGY | Facility: HOSPITAL | Age: 32
End: 2022-05-03
Payer: COMMERCIAL

## 2022-05-03 VITALS
DIASTOLIC BLOOD PRESSURE: 86 MMHG | RESPIRATION RATE: 16 BRPM | HEART RATE: 93 BPM | OXYGEN SATURATION: 97 % | SYSTOLIC BLOOD PRESSURE: 144 MMHG

## 2022-05-03 DIAGNOSIS — M25.571 RIGHT ANKLE PAIN: Primary | ICD-10-CM
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
## 2022-05-03 DIAGNOSIS — S00.91XA ABRASION OF HEAD, INITIAL ENCOUNTER: ICD-10-CM

## 2022-05-03 PROCEDURE — 70450 CT HEAD/BRAIN W/O DYE: CPT

## 2022-05-03 PROCEDURE — 99284 EMERGENCY DEPT VISIT MOD MDM: CPT | Performed by: EMERGENCY MEDICINE

## 2022-05-03 PROCEDURE — G1004 CDSM NDSC: HCPCS

## 2022-05-03 PROCEDURE — 73610 X-RAY EXAM OF ANKLE: CPT

## 2022-05-03 PROCEDURE — 99284 EMERGENCY DEPT VISIT MOD MDM: CPT

## 2022-05-03 RX ORDER — ACETAMINOPHEN 325 MG/1
975 TABLET ORAL ONCE
Status: COMPLETED | OUTPATIENT
Start: 2022-05-03 | End: 2022-05-03

## 2022-05-03 RX ADMIN — ACETAMINOPHEN 975 MG: 325 TABLET ORAL at 14:40
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
## 2022-05-05 ENCOUNTER — APPOINTMENT (EMERGENCY)
Dept: RADIOLOGY | Facility: HOSPITAL | Age: 32
End: 2022-05-05
Payer: COMMERCIAL

## 2022-05-05 ENCOUNTER — HOSPITAL ENCOUNTER (EMERGENCY)
Facility: HOSPITAL | Age: 32
Discharge: HOME | End: 2022-05-05
Attending: EMERGENCY MEDICINE | Admitting: EMERGENCY MEDICINE
Payer: COMMERCIAL

## 2022-05-05 VITALS
SYSTOLIC BLOOD PRESSURE: 158 MMHG | RESPIRATION RATE: 18 BRPM | OXYGEN SATURATION: 97 % | HEART RATE: 108 BPM | DIASTOLIC BLOOD PRESSURE: 94 MMHG | TEMPERATURE: 97.8 F

## 2022-05-05 DIAGNOSIS — M25.512 LEFT SHOULDER PAIN: Primary | ICD-10-CM
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
## 2022-05-05 DIAGNOSIS — T14.8XXA CONTUSION: ICD-10-CM

## 2022-05-05 PROCEDURE — 99282 EMERGENCY DEPT VISIT SF MDM: CPT | Performed by: EMERGENCY MEDICINE

## 2022-05-05 PROCEDURE — 99283 EMERGENCY DEPT VISIT LOW MDM: CPT

## 2022-05-05 PROCEDURE — 73030 X-RAY EXAM OF SHOULDER: CPT
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
## 2022-09-07 ENCOUNTER — HOSPITAL ENCOUNTER (EMERGENCY)
Facility: HOSPITAL | Age: 32
Discharge: HOME | End: 2022-09-07
Attending: EMERGENCY MEDICINE
Payer: COMMERCIAL

## 2022-09-07 VITALS
DIASTOLIC BLOOD PRESSURE: 95 MMHG | BODY MASS INDEX: 32.64 KG/M2 | HEART RATE: 98 BPM | RESPIRATION RATE: 22 BRPM | TEMPERATURE: 98.2 F | WEIGHT: 167.11 LBS | OXYGEN SATURATION: 100 % | SYSTOLIC BLOOD PRESSURE: 137 MMHG

## 2022-09-07 DIAGNOSIS — S61.011A LACERATION OF RIGHT THUMB WITHOUT FOREIGN BODY WITHOUT DAMAGE TO NAIL, INITIAL ENCOUNTER: Primary | ICD-10-CM

## 2022-09-07 PROCEDURE — 12001 RPR S/N/AX/GEN/TRNK 2.5CM/<: CPT | Performed by: PHYSICIAN ASSISTANT

## 2022-09-07 PROCEDURE — 99282 EMERGENCY DEPT VISIT SF MDM: CPT | Performed by: PHYSICIAN ASSISTANT

## 2022-09-07 PROCEDURE — 99282 EMERGENCY DEPT VISIT SF MDM: CPT

## 2022-09-07 NOTE — ED PROVIDER NOTES
History  Chief Complaint   Patient presents with    Hand Laceration     Pt c/o cutting his right thumb on glass      Patient is a 35 y/o male hx of ASD and psychiatric disorder, presenting to the ED for evaluation of right thumb laceration  Pt is right hand dominant, UTD on tetanus (2020), states cut right thumb on glass PTA  Bleeding controlled with pressure  Pt with full ROM of thumb  Denies numbness/tingling  Prior to Admission Medications   Prescriptions Last Dose Informant Patient Reported? Taking? Melatonin ER 10 MG TBCR   Yes No   Sig: Take 10 mg by mouth   QUEtiapine (SEROquel) 400 MG tablet   Yes No   Sig: Take 400 mg by mouth 2 (two) times a day    albuterol (2 5 mg/3 mL) 0 083 % nebulizer solution   No No   Sig: Take 3 mL (2 5 mg total) by nebulization every 6 (six) hours as needed for wheezing or shortness of breath   albuterol (PROVENTIL HFA,VENTOLIN HFA) 90 mcg/act inhaler   No No   Sig: Inhale 1-2 puffs every 6 (six) hours as needed for wheezing   divalproex sodium (DEPAKOTE ER) 500 mg 24 hr tablet   Yes No   Sig: Take 1,000 mg by mouth daily XR formula   hydrocortisone 1 % ointment   No No   Sig: Apply 1 g (1 application total) topically 2 (two) times a day for 3 days      Facility-Administered Medications: None       Past Medical History:   Diagnosis Date    Asthma     Autism spectrum disorder     Bipolar 1 disorder (HCC)     Cognitive impairment     Depression     Intellectual functioning disability     Intestinal disaccharidase deficiency     Mood swings     Psychiatric illness     Schizo-affective schizophrenia ([facility] [address] )     Self-injurious behavior        Past Surgical History:   Procedure Laterality Date    FRACTURE SURGERY      SHOULDER SURGERY         History reviewed  No pertinent family history  I have reviewed and agree with the history as documented      E-Cigarette/Vaping    E-Cigarette Use Never User      E-Cigarette/Vaping Substances     Social History     Tobacco Use    Smoking status: Current Every Day Smoker     Packs/day: 0 25     Types: Cigarettes    Smokeless tobacco: Never Used   Vaping Use    Vaping Use: Never used   Substance Use Topics    Alcohol use: No    Drug use: Not Currently     Types: "Crack" cocaine, Cocaine       Review of Systems   Skin: Positive for wound  All other systems reviewed and are negative  Physical Exam  Physical Exam  Constitutional:       Appearance: Normal appearance  HENT:      Head: Normocephalic and atraumatic  Right Ear: External ear normal       Left Ear: External ear normal       Nose: Nose normal       Mouth/Throat:      Lips: Pink  Mouth: Mucous membranes are moist    Eyes:      Extraocular Movements: Extraocular movements intact  Conjunctiva/sclera: Conjunctivae normal    Pulmonary:      Effort: No tachypnea or respiratory distress  Musculoskeletal:        Hands:       Cervical back: Normal range of motion and neck supple  Comments: Neurovascularly intact distally  5/5 strength bilateral upper extremities  Radial pulse 2 +  Cap refill <2 seconds  Sensation intact  Skin:     General: Skin is warm  Capillary Refill: Capillary refill takes less than 2 seconds  Neurological:      Mental Status: He is alert and oriented to person, place, and time  GCS: GCS eye subscore is 4  GCS verbal subscore is 5  GCS motor subscore is 6     Psychiatric:         Mood and Affect: Mood and affect normal          Speech: Speech normal          Vital Signs  ED Triage Vitals [09/07/22 1442]   Temperature Pulse Respirations Blood Pressure SpO2   98 2 °F (36 8 °C) 98 22 137/95 100 %      Temp src Heart Rate Source Patient Position - Orthostatic VS BP Location FiO2 (%)   -- -- Sitting Right arm --      Pain Score       --           Vitals:    09/07/22 1442   BP: 137/95   Pulse: 98   Patient Position - Orthostatic VS: Sitting         Visual Acuity      ED Medications  Medications - No data to display    Diagnostic Studies  Results Reviewed     None                 No orders to display              Procedures  Laceration repair    Date/Time: 9/7/2022 3:15 PM  Performed by: Rachell Pierce PA-C  Authorized by: Rachell Pierce PA-C   Consent: Verbal consent obtained  Risks and benefits: risks, benefits and alternatives were discussed  Consent given by: patient and guardian  Patient identity confirmed: verbally with patient and arm band  Time out: Immediately prior to procedure a "time out" was called to verify the correct patient, procedure, equipment, support staff and site/side marked as required  Body area: upper extremity  Location details: right thumb  Laceration length: 1 cm  Foreign bodies: no foreign bodies  Tendon involvement: none  Nerve involvement: none  Vascular damage: no    Sedation:  Patient sedated: no        Procedure Details:  Preparation: Patient was prepped and draped in the usual sterile fashion  Irrigation solution: saline  Irrigation method: syringe  Debridement: none  Degree of undermining: none  Skin closure: glue  Dressing: gauze roll and non-adhesive packing strip  Patient tolerance: patient tolerated the procedure well with no immediate complications               ED Course                                             MDM  Number of Diagnoses or Management Options  Laceration of right thumb without foreign body without damage to nail, initial encounter  Diagnosis management comments: Patient is a 33 y/o male hx of ASD and psychiatric disorder, presenting to the ED for evaluation of right thumb laceration  Superficial laceration to right thumb - cleaned and repaired with glue, dressed    Patient verbalizes understanding and agrees with plan  The management plan was discussed in detail with the patient at bedside and all questions were answered  Prior to discharge, I provided both verbal and written instructions   I discussed with the patient the signs and symptoms for which to return to the emergency department  All questions were answered and patient was comfortable with the plan of care and discharged to home  The patient agrees to return to the Emergency Department for concerns and/or progression of illness  Disposition  Final diagnoses:   Laceration of right thumb without foreign body without damage to nail, initial encounter     Time reflects when diagnosis was documented in both MDM as applicable and the Disposition within this note     Time User Action Codes Description Comment    9/7/2022  3:12 PM Vera Carbajal Add [S63 011A] Laceration of right thumb without foreign body without damage to nail, initial encounter       ED Disposition     ED Disposition   Discharge    Condition   Stable    Date/Time   Wed Sep 7, 2022  3:12 PM    Bryson Laguerre discharge to home/self care  Follow-up Information     Follow up With Specialties Details Why Contact Info    BRANDEE Quan Nurse Practitioner   [address] [facility] [address]  [phone]            Patient's Medications   Discharge Prescriptions    No medications on file       No discharge procedures on file      PDMP Review     None          ED Provider  Electronically Signed by           Young Munoz PA-C  09/07/22 4208

## 2022-09-22 PROBLEM — F11.90 HEROIN USE: Status: ACTIVE | Noted: 2020-07-13

## 2022-09-22 PROBLEM — Z20.5 EXPOSURE TO HEPATITIS: Status: ACTIVE | Noted: 2019-04-01

## 2022-09-22 PROBLEM — F17.200 CURRENT SMOKER: Status: ACTIVE | Noted: 2019-04-25

## 2022-09-22 PROBLEM — Z59.00 HOMELESSNESS: Status: ACTIVE | Noted: 2018-01-17

## 2022-09-22 PROBLEM — Z20.6 EXPOSURE TO HIV: Status: ACTIVE | Noted: 2019-04-01

## 2022-09-23 ENCOUNTER — OFFICE VISIT (OUTPATIENT)
Dept: DENTISTRY | Facility: CLINIC | Age: 32
End: 2022-09-23

## 2022-09-23 VITALS — TEMPERATURE: 99.8 F | DIASTOLIC BLOOD PRESSURE: 98 MMHG | HEART RATE: 106 BPM | SYSTOLIC BLOOD PRESSURE: 148 MMHG

## 2022-09-23 DIAGNOSIS — K02.9 DENTAL CARIES: ICD-10-CM

## 2022-09-23 DIAGNOSIS — Z01.20 ENCOUNTER FOR DENTAL EXAMINATION: Primary | ICD-10-CM

## 2022-09-23 PROCEDURE — D0210 INTRAORAL - COMPLETE SERIES OF RADIOGRAPHIC IMAGES: HCPCS

## 2022-09-23 PROCEDURE — D0150 COMPREHENSIVE ORAL EVALUATION - NEW OR ESTABLISHED PATIENT: HCPCS | Performed by: DENTIST

## 2022-09-23 NOTE — PROGRESS NOTES
Comprehensive Exam    Kalina Mahoney presents for a comprehensive exam  Verbal consent for treatment given in addition to the forms  patient presented with care giver from group home for initial dental encounter    Reviewed health history - Patient is ASA Class II  Autistic,  multiple MH issues    See EPIC  Consents signed: Yes    Perio: Recession spot probed 3 mm generally   Patient states he is sensitive to "metal things in his mouth" and was very nervous about getting a " cleaning"   I explained this visit was for an exam and x rays to see what needs to be done  Pain Scale: 5  Caries Assessment: high  Radiographs:FMX,    He trembled the entire time but we were successful    Oral Hygiene instruction reviewed and given  Treatment Plan:  1  Infection control  2  Periodontal therapy:  3  Caries control:  4   Occlusal evaluation    Prognosis is guarded  Referrals needed:  Possible oral surgeon  Possible OR case  Next visit:   Dr Adri Bennett will discuss case with DR CHARLTON and we will get back to the Mom  He has extensive decay along the gum line and several non restorable teeth  Hypo salivary function noted    Patient may be best served by having the restorative and initial prophy done in OR

## 2022-09-27 ENCOUNTER — TELEPHONE (OUTPATIENT)
Dept: DENTISTRY | Facility: CLINIC | Age: 32
End: 2022-09-27

## 2022-09-27 NOTE — TELEPHONE ENCOUNTER
Talked to pt's mom , Tristan Ontiveros regarding pt's dental issues:  Pt is v anxious, scared and anxious of seeing the dentist,   1) Pt has poor oral hygiene, rampant decay generally  2) pt also need sesome extractions    Advised mom to reach out to dental schools as in [address] (as suggested by Dr Nicole Herrera) to see if pt  could be seen asap  Also adv referral to OS  for extrns  Mom adv to follow up at clinic with pt ,  for tr  Plan discussion and OS referral (to see which teeth will be extracted)   Mom said she will call once she makes an appt for [facility]  [facility]ental school

## 2023-01-18 ENCOUNTER — HOSPITAL ENCOUNTER (EMERGENCY)
Facility: HOSPITAL | Age: 33
Discharge: HOME | End: 2023-01-18
Attending: EMERGENCY MEDICINE

## 2023-01-18 VITALS
HEIGHT: 60 IN | BODY MASS INDEX: 32.39 KG/M2 | DIASTOLIC BLOOD PRESSURE: 80 MMHG | HEART RATE: 121 BPM | SYSTOLIC BLOOD PRESSURE: 149 MMHG | RESPIRATION RATE: 20 BRPM | OXYGEN SATURATION: 99 % | WEIGHT: 165 LBS | TEMPERATURE: 97.9 F

## 2023-01-18 DIAGNOSIS — J45.901 ASTHMA EXACERBATION: Primary | ICD-10-CM

## 2023-01-18 RX ORDER — ALBUTEROL SULFATE 90 UG/1
2 AEROSOL, METERED RESPIRATORY (INHALATION) ONCE
Status: COMPLETED | OUTPATIENT
Start: 2023-01-18 | End: 2023-01-18

## 2023-01-18 RX ORDER — ALBUTEROL SULFATE 90 UG/1
1-2 AEROSOL, METERED RESPIRATORY (INHALATION) EVERY 6 HOURS PRN
Qty: 6.7 G | Refills: 0 | Status: SHIPPED | OUTPATIENT
Start: 2023-01-18

## 2023-01-18 RX ADMIN — ALBUTEROL SULFATE 2 PUFF: 90 AEROSOL, METERED RESPIRATORY (INHALATION) at 13:14

## 2023-01-18 NOTE — ED PROVIDER NOTES
History  Chief Complaint   Patient presents with   • Asthma     Patient reports he needs an inhaler, trouble breathing since yesterday  "My pcp told me to come to the hospital"  35 yo male with a history of asthma, autism, bipolar disorder, schizophrenia, and intellectual disability presents to the ED for evaluation of an asthma exacerbation  The patient reports one day of dyspnea on exertion and intermittent wheezing  He says he "lost" his albuterol inhaler so he was told to come to the ED  No chest pain or tightness  No fevers or chills  He denies cough  No recent history of intubations  No other specific complaints  Prior to Admission Medications   Prescriptions Last Dose Informant Patient Reported? Taking?    Melatonin ER 10 MG TBCR   Yes No   Sig: Take 10 mg by mouth   QUEtiapine (SEROquel) 400 MG tablet   Yes No   Sig: Take 400 mg by mouth 2 (two) times a day    albuterol (2 5 mg/3 mL) 0 083 % nebulizer solution   No No   Sig: Take 3 mL (2 5 mg total) by nebulization every 6 (six) hours as needed for wheezing or shortness of breath   albuterol (PROVENTIL HFA,VENTOLIN HFA) 90 mcg/act inhaler   No No   Sig: Inhale 1-2 puffs every 6 (six) hours as needed for wheezing   divalproex sodium (DEPAKOTE ER) 500 mg 24 hr tablet   Yes No   Sig: Take 1,000 mg by mouth daily XR formula   hydrocortisone 1 % ointment   No No   Sig: Apply 1 g (1 application total) topically 2 (two) times a day for 3 days      Facility-Administered Medications: None       Past Medical History:   Diagnosis Date   • Asthma    • Autism spectrum disorder    • Bipolar 1 disorder (HCC)    • Cognitive impairment    • Depression    • Intellectual functioning disability    • Intestinal disaccharidase deficiency    • Mood swings    • Psychiatric illness    • Schizo-affective schizophrenia ([facility] [address] )    • Self-injurious behavior        Past Surgical History:   Procedure Laterality Date   • FRACTURE SURGERY     • SHOULDER SURGERY         History reviewed  No pertinent family history  I have reviewed and agree with the history as documented  E-Cigarette/Vaping   • E-Cigarette Use Never User      E-Cigarette/Vaping Substances     Social History     Tobacco Use   • Smoking status: Every Day     Packs/day: 0 50     Types: Cigarettes   • Smokeless tobacco: Never   Vaping Use   • Vaping Use: Never used   Substance Use Topics   • Alcohol use: No   • Drug use: Not Currently     Types: "Crack" cocaine, Cocaine       Review of Systems   Constitutional: Negative for chills and fever  HENT: Negative for sore throat  Respiratory: Positive for shortness of breath and wheezing  Negative for cough and chest tightness  Cardiovascular: Negative for chest pain and palpitations  Gastrointestinal: Negative for abdominal pain, diarrhea, nausea and vomiting  Endocrine: Negative for cold intolerance and heat intolerance  Genitourinary: Negative for dysuria and flank pain  Musculoskeletal: Negative for back pain  Skin: Negative for rash  Allergic/Immunologic: Negative for immunocompromised state  Neurological: Negative for dizziness, weakness, light-headedness and headaches  Hematological: Negative for adenopathy  Psychiatric/Behavioral: The patient is not nervous/anxious  Physical Exam  Physical Exam  Constitutional:       General: He is not in acute distress  Appearance: He is well-developed  HENT:      Head: Normocephalic and atraumatic  Eyes:      Pupils: Pupils are equal, round, and reactive to light  Cardiovascular:      Rate and Rhythm: Regular rhythm  Tachycardia present  Pulmonary:      Effort: Pulmonary effort is normal  No respiratory distress  Breath sounds: Normal breath sounds  Abdominal:      General: There is no distension  Palpations: Abdomen is soft  Tenderness: There is no abdominal tenderness  Musculoskeletal:         General: Normal range of motion        Cervical back: Normal range of motion and neck supple  Skin:     General: Skin is warm and dry  Neurological:      Mental Status: He is alert and oriented to person, place, and time  Vital Signs  ED Triage Vitals [01/18/23 1254]   Temperature Pulse Respirations Blood Pressure SpO2   97 9 °F (36 6 °C) (!) 121 20 149/80 99 %      Temp Source Heart Rate Source Patient Position - Orthostatic VS BP Location FiO2 (%)   Tympanic Monitor Sitting Left arm --      Pain Score       No Pain           Vitals:    01/18/23 1254   BP: 149/80   Pulse: (!) 121   Patient Position - Orthostatic VS: Sitting         Visual Acuity      ED Medications  Medications   albuterol (PROVENTIL HFA,VENTOLIN HFA) inhaler 2 puff (2 puffs Inhalation Given 1/18/23 1314)       Diagnostic Studies  Results Reviewed     None                 No orders to display              Procedures  Procedures         ED Course                               SBIRT 22yo+    Flowsheet Row Most Recent Value   SBIRT (23 yo +)    In order to provide better care to our patients, we are screening all of our patients for alcohol and drug use  Would it be okay to ask you these screening questions? No Filed at: 01/18/2023 1255                    Medical Decision Making  The patient is very well appearing with stable vital signs other than a mild tachycardia  Lungs are CTA B/L and he says he feels "good" at present  Duo-neb offered but the patient declined, stating he simply came to the ED to obtain another albuterol inhaler  MDI administered in the ED and a second inhaler was called into the pharmacy  Plan for close follow up with his PCP later this week  The patient is agreeable to this plan  Strict return precautions provided  Asthma exacerbation: acute illness or injury  Risk  Prescription drug management            Disposition  Final diagnoses:   Asthma exacerbation     Time reflects when diagnosis was documented in both MDM as applicable and the Disposition within this note     Time User Action Codes Description Comment    1/18/2023  1:12 PM Benjie Hansen Add [T89 [id number]] Asthma exacerbation       ED Disposition     ED Disposition   Discharge    Condition   Stable    Date/Time   Wed Jan 18, 2023  1:12 PM    Comment   Anna Albert discharge to home/self care  Follow-up Information     Follow up With Specialties Details Why Contact BRANDEE Roger Nurse Practitioner Schedule an appointment as soon as possible for a visit   [address] [facility] [address]  [phone]            Discharge Medication List as of 1/18/2023  1:13 PM      START taking these medications    Details   !! albuterol (Proventil HFA) 90 mcg/act inhaler Inhale 1-2 puffs every 6 (six) hours as needed for wheezing or shortness of breath, Starting Wed 1/18/2023, Normal       !! - Potential duplicate medications found  Please discuss with provider  CONTINUE these medications which have NOT CHANGED    Details   albuterol (2 5 mg/3 mL) 0 083 % nebulizer solution Take 3 mL (2 5 mg total) by nebulization every 6 (six) hours as needed for wheezing or shortness of breath, Starting Sun 11/21/2021, Normal      !! albuterol (PROVENTIL HFA,VENTOLIN HFA) 90 mcg/act inhaler Inhale 1-2 puffs every 6 (six) hours as needed for wheezing, Starting Wed 12/2/2020, Print      divalproex sodium (DEPAKOTE ER) 500 mg 24 hr tablet Take 1,000 mg by mouth daily XR formula, Starting Wed 4/4/2012, Historical Med      hydrocortisone 1 % ointment Apply 1 g (1 application total) topically 2 (two) times a day for 3 days, Starting Wed 2/23/2022, Until Sat 2/26/2022, Normal      Melatonin ER 10 MG TBCR Take 10 mg by mouth, Historical Med      QUEtiapine (SEROquel) 400 MG tablet Take 400 mg by mouth 2 (two) times a day , Historical Med       !! - Potential duplicate medications found  Please discuss with provider  No discharge procedures on file      PDMP Review     None          ED Provider  Electronically Signed by Viri Martinez MD  01/19/23 0002

## 2023-05-03 ENCOUNTER — TELEPHONE (OUTPATIENT)
Dept: DENTISTRY | Facility: CLINIC | Age: 33
End: 2023-05-03

## 2023-05-15 ENCOUNTER — HOSPITAL ENCOUNTER (EMERGENCY)
Facility: HOSPITAL | Age: 33
Discharge: HOME | End: 2023-05-15
Attending: EMERGENCY MEDICINE

## 2023-05-15 VITALS
BODY MASS INDEX: 33.15 KG/M2 | SYSTOLIC BLOOD PRESSURE: 121 MMHG | DIASTOLIC BLOOD PRESSURE: 99 MMHG | HEART RATE: 106 BPM | TEMPERATURE: 97.7 F | RESPIRATION RATE: 20 BRPM | OXYGEN SATURATION: 100 % | WEIGHT: 169.75 LBS

## 2023-05-15 DIAGNOSIS — J30.2 SEASONAL ALLERGIES: Primary | ICD-10-CM

## 2023-05-15 DIAGNOSIS — Z76.0 MEDICATION REFILL: ICD-10-CM

## 2023-05-15 RX ORDER — FLUTICASONE PROPIONATE 50 MCG
1 SPRAY, SUSPENSION (ML) NASAL DAILY
Qty: 16 G | Refills: 0 | Status: SHIPPED | OUTPATIENT
Start: 2023-05-15

## 2023-05-15 RX ORDER — ALBUTEROL SULFATE 90 UG/1
2 AEROSOL, METERED RESPIRATORY (INHALATION) EVERY 6 HOURS PRN
Qty: 6.7 G | Refills: 0 | Status: SHIPPED | OUTPATIENT
Start: 2023-05-15

## 2023-05-15 RX ORDER — LORATADINE 10 MG/1
10 TABLET ORAL DAILY
Qty: 20 TABLET | Refills: 0 | Status: SHIPPED | OUTPATIENT
Start: 2023-05-15

## 2023-05-15 NOTE — ED NOTES
Pt seen, assessed and discharged by provider  Pt ambulated out of ED without difficulty        Niharika Camarillo RN  05/15/23 3594

## 2023-05-15 NOTE — ED PROVIDER NOTES
History  Chief Complaint   Patient presents with   • Cold Like Symptoms     Pt reports sneezing, nasal congestion, and dry cough for the past week  Reports face turns red when he coughs  Denies fevers  Patient is a 29 y/o male with a PMH of asthma, autism, bipolar disorder, schizoprenia, cognitive impairment who is accompanied to the ED by staff member for evaluation of allergy/cold symptoms  Patient lives in independently in an apartment but has 24 hr care  Staff member states that for the past 2 weeks the patient has been having sneezing, watery itchy eyes, runny nose/congestion, occasional sore throat, and dry cough  He states he brought him in today because it hasn't gotten better  He doesn't take any medications for seasonal allergies  Patient also has a hx of asthma but states he lost his inhaler in the park so he hasn't had one to use for his coughing or tightness when he gets it  There has been no fever, chills, headache, chest pain, shortness of breath, wheezing, stridor, nausea, vomiting, diarrhea, rash, ear pain/drainage, difficulty swallowing  No change in urination or bowel habits  Prior to Admission Medications   Prescriptions Last Dose Informant Patient Reported? Taking?    Melatonin ER 10 MG TBCR   Yes No   Sig: Take 10 mg by mouth   QUEtiapine (SEROquel) 400 MG tablet   Yes No   Sig: Take 400 mg by mouth 2 (two) times a day    albuterol (2 5 mg/3 mL) 0 083 % nebulizer solution   No No   Sig: Take 3 mL (2 5 mg total) by nebulization every 6 (six) hours as needed for wheezing or shortness of breath   albuterol (PROVENTIL HFA,VENTOLIN HFA) 90 mcg/act inhaler   No No   Sig: Inhale 1-2 puffs every 6 (six) hours as needed for wheezing   albuterol (Proventil HFA) 90 mcg/act inhaler   No No   Sig: Inhale 1-2 puffs every 6 (six) hours as needed for wheezing or shortness of breath   divalproex sodium (DEPAKOTE ER) 500 mg 24 hr tablet   Yes No   Sig: Take 1,000 mg by mouth daily XR formula TRANSITIONAL CARE MANAGEMENT - HOSPITAL DISCHARGE FOLLOW-UP    Contacted Ms. Daniel regarding follow-up for dementia with behavioral disturbance after hospital discharge. She was discharged from the hospital on 6-2-20. Review of the After Visit Summary from the recent hospitalization indicates that the patient needs to follow up with provider.    She feels that she is doing fairly well at home.   Her diet concern is none. Overall, the patient is eating well. Refused to eat supper last night  Ambulation: staying the same  Fever: is not present  Pain: none  Activities of Daily Living (global): Total assistance   Patient states that she does have sufficient family support. She feels that she is able to ask for assistance when needed.     Additional patient/family concerns: None .    Discharge medications were verified with the patient. She is fully compliant with the medication regimen prescribed at the time of discharge. She reports that she is not experiencing any medication side effects.    Upon discharge, the patient was to receive hospice. These services have been initiated. These services have been scheduled and no further arrangements are needed at this time.     Advance Directives:  on file    Patient has an appointment on 6-4-2020 with Cynthia Coats NP. Ms. Daniel was reminded about the importance of keeping this appointment.       Situation: TCM       Key Assessments:RNCC called [facility] and spoke with Christina Costa.Patient slept through the night last night but had a difficult evening yesterday. She refused to take medication and eat supper yesterday. [facility] on boarded her and changed some medications. She was calm today and eating. Walking around like normal but hospice did order her a wheelchair just in case.    She does not complain of pain. No fevers. She does having bruising and skin tears which is new and Christina Costa thinks it happened in route to the hospital or at the hospital with  "  hydrocortisone 1 % ointment   No No   Sig: Apply 1 g (1 application total) topically 2 (two) times a day for 3 days      Facility-Administered Medications: None       Past Medical History:   Diagnosis Date   • Asthma    • Autism spectrum disorder    • Bipolar 1 disorder (HCC)    • Cognitive impairment    • Depression    • Intellectual functioning disability    • Intestinal disaccharidase deficiency    • Mood swings    • Psychiatric illness    • Schizo-affective schizophrenia ([facility] [address] )    • Self-injurious behavior        Past Surgical History:   Procedure Laterality Date   • FRACTURE SURGERY     • SHOULDER SURGERY         History reviewed  No pertinent family history  I have reviewed and agree with the history as documented  E-Cigarette/Vaping   • E-Cigarette Use Never User      E-Cigarette/Vaping Substances     Social History     Tobacco Use   • Smoking status: Every Day     Packs/day: 0 50     Types: Cigarettes   • Smokeless tobacco: Never   Vaping Use   • Vaping Use: Never used   Substance Use Topics   • Alcohol use: No   • Drug use: Not Currently     Types: \"Crack\" cocaine, Cocaine       Review of Systems   Constitutional: Negative for activity change, appetite change, chills, fatigue and fever  HENT: Positive for congestion, postnasal drip, rhinorrhea, sneezing and sore throat  Negative for ear discharge, ear pain, facial swelling, sinus pain and trouble swallowing  Eyes: Positive for itching  Negative for pain and discharge  Respiratory: Positive for cough  Negative for shortness of breath and wheezing  Cardiovascular: Negative for chest pain  Gastrointestinal: Negative for abdominal pain, diarrhea, nausea and vomiting  Genitourinary: Negative for decreased urine volume and difficulty urinating  Skin: Negative for rash  Neurological: Negative for headaches  All other systems reviewed and are negative  Physical Exam  Physical Exam  Vitals and nursing note reviewed     Constitutional:  " her agitition and combativeness.       Actions Taken:med list updated      Plan:RNCC will follow up in one week.       See hyperlinks within encounter for full documentation   General: He is not in acute distress  Appearance: Normal appearance  He is normal weight  He is not ill-appearing, toxic-appearing or diaphoretic  HENT:      Head: Normocephalic and atraumatic  Right Ear: Ear canal and external ear normal       Left Ear: Tympanic membrane, ear canal and external ear normal       Nose: Congestion and rhinorrhea present  Right Sinus: No maxillary sinus tenderness or frontal sinus tenderness  Left Sinus: No maxillary sinus tenderness or frontal sinus tenderness  Mouth/Throat:      Lips: Pink  Mouth: Mucous membranes are moist  No oral lesions  Pharynx: Oropharynx is clear  Uvula midline  No pharyngeal swelling, oropharyngeal exudate, posterior oropharyngeal erythema or uvula swelling  Comments: Post nasal drip noted  Eyes:      Extraocular Movements: Extraocular movements intact  Conjunctiva/sclera: Conjunctivae normal    Cardiovascular:      Rate and Rhythm: Normal rate and regular rhythm  Heart sounds: Normal heart sounds  No murmur heard  Pulmonary:      Effort: Pulmonary effort is normal  No respiratory distress  Breath sounds: Normal breath sounds  No stridor  No wheezing, rhonchi or rales  Abdominal:      General: Abdomen is flat  Bowel sounds are normal  There is no distension  Palpations: Abdomen is soft  Tenderness: There is no abdominal tenderness  There is no guarding  Musculoskeletal:         General: Normal range of motion  Cervical back: Normal range of motion and neck supple  No rigidity or tenderness  Lymphadenopathy:      Cervical: No cervical adenopathy  Skin:     General: Skin is warm and dry  Neurological:      Mental Status: He is alert     Psychiatric:         Mood and Affect: Mood normal          Vital Signs  ED Triage Vitals [05/15/23 1108]   Temperature Pulse Respirations Blood Pressure SpO2   97 7 °F (36 5 °C) (!) 106 20 121/99 100 %      Temp Source Heart Rate Source Patient Position - Orthostatic VS BP Location FiO2 (%)   Oral Monitor Lying Right arm --      Pain Score       No Pain           Vitals:    05/15/23 1108   BP: 121/99   Pulse: (!) 106   Patient Position - Orthostatic VS: Lying         Visual Acuity      ED Medications  Medications - No data to display    Diagnostic Studies  Results Reviewed     None                 No orders to display              Procedures  Procedures         ED Course                                             Medical Decision Making  Patient is a 27 y/o male with a PMH of asthma, autism, bipolar disorder, schizophrenia, cognitive impairment who is accompanied to the ED by staff member for evaluation of allergy/cold symptoms x 2 weeks  Patient has been having sneezing, itchy watery eyes, runny nose/congestion, occasional sore throat, dry cough  Does not take medications for allergies  Lost his inhaler in the park and has not been using it for coughing or occasional tightness  Staff member states they need a refill  No fevers, chills, chest pain, SOB, NVD, abdominal pain, ear pain, difficulty swallowing, sinus pain, change in activity, change in appetite  Patient is well appearing, non toxic and in NAD  Mild nasal congestion and post nasal drip noted on exam  Lungs CTA bilaterally without w/r/r  Speaks in full sentences without difficulty  Discussed seasonal allergies with patient and staff member  Will start on claritin and flonase  Will also refill albuterol inhaler  Discussed follow up with his PCP and strict return precautions  Patient and staff member state understanding and agrees with plan  Medication refill: acute illness or injury  Seasonal allergies: acute illness or injury  Amount and/or Complexity of Data Reviewed  Independent Historian: caregiver      Risk  OTC drugs  Prescription drug management            Disposition  Final diagnoses:   Seasonal allergies   Medication refill     Time reflects when diagnosis was documented in both MDM as applicable and the Disposition within this note     Time User Action Codes Description Comment    5/15/2023 11:29 AM Oliva Malin Add [J30 2] Seasonal allergies     5/15/2023 11:29 AM Oliva Malin Add [Z76 0] Medication refill       ED Disposition     ED Disposition   Discharge    Condition   Stable    Date/Time   Mon May 15, 2023 11:28 AM    Comment   Ashley Hernández discharge to home/self care  Follow-up Information     Follow up With Specialties Details Why Contact Info Additional [address], CRNP Nurse Practitioner Schedule an appointment as soon as possible for a visit in 1 day  [address] [address]        [facility] Emergency Department Emergency Medicine  If symptoms worsen [facility] [id number]  [address] [facility], [place], operated by [facility], [address], [place], [address]          Discharge Medication List as of 5/15/2023 11:30 AM      START taking these medications    Details   !! albuterol (PROVENTIL HFA,VENTOLIN HFA) 90 mcg/act inhaler Inhale 2 puffs every 6 (six) hours as needed for shortness of breath or wheezing, Starting Mon 5/15/2023, Normal      fluticasone (FLONASE) 50 mcg/act nasal spray 1 spray into each nostril daily, Starting Mon 5/15/2023, Normal      loratadine (CLARITIN) 10 mg tablet Take 1 tablet (10 mg total) by mouth daily, Starting Mon 5/15/2023, Normal       !! - Potential duplicate medications found  Please discuss with provider        CONTINUE these medications which have NOT CHANGED    Details   albuterol (2 5 mg/3 mL) 0 083 % nebulizer solution Take 3 mL (2 5 mg total) by nebulization every 6 (six) hours as needed for wheezing or shortness of breath, Starting Sun 11/21/2021, Normal      !! albuterol (Proventil HFA) 90 mcg/act inhaler Inhale 1-2 puffs every 6 (six) hours as needed for wheezing or shortness of breath, Starting Wed 1/18/2023, Normal      !! albuterol (PROVENTIL HFA,VENTOLIN HFA) 90 mcg/act inhaler Inhale 1-2 puffs every 6 (six) hours as needed for wheezing, Starting Wed 12/2/2020, Print      divalproex sodium (DEPAKOTE ER) 500 mg 24 hr tablet Take 1,000 mg by mouth daily XR formula, Starting Wed 4/4/2012, Historical Med      hydrocortisone 1 % ointment Apply 1 g (1 application total) topically 2 (two) times a day for 3 days, Starting Wed 2/23/2022, Until Sat 2/26/2022, Normal      Melatonin ER 10 MG TBCR Take 10 mg by mouth, Historical Med      QUEtiapine (SEROquel) 400 MG tablet Take 400 mg by mouth 2 (two) times a day , Historical Med       !! - Potential duplicate medications found  Please discuss with provider  No discharge procedures on file      PDMP Review     None          ED Provider  Electronically Signed by           Gayathri Warren PA-C  05/15/23 8184

## 2023-06-14 ENCOUNTER — PATIENT OUTREACH (OUTPATIENT)
Dept: OTHER | Facility: OTHER | Age: 33
End: 2023-06-14

## 2023-06-14 NOTE — PROGRESS NOTES
6-14-23: Client requested that I look up his social security number which I was able to give to him  I printed it on a card without his name connected to it  I instructed him to keep it safe

## 2023-09-18 ENCOUNTER — HOSPITAL ENCOUNTER (EMERGENCY)
Facility: HOSPITAL | Age: 33
Discharge: HOME | End: 2023-09-19
Attending: EMERGENCY MEDICINE | Admitting: EMERGENCY MEDICINE
Payer: COMMERCIAL

## 2023-09-18 VITALS
HEART RATE: 104 BPM | SYSTOLIC BLOOD PRESSURE: 171 MMHG | OXYGEN SATURATION: 98 % | RESPIRATION RATE: 16 BRPM | BODY MASS INDEX: 32.46 KG/M2 | WEIGHT: 166.23 LBS | DIASTOLIC BLOOD PRESSURE: 96 MMHG | TEMPERATURE: 97.8 F

## 2023-09-18 DIAGNOSIS — Z76.0 MEDICATION REFILL: Primary | ICD-10-CM

## 2023-09-18 PROCEDURE — 99281 EMR DPT VST MAYX REQ PHY/QHP: CPT

## 2023-09-18 PROCEDURE — 99284 EMERGENCY DEPT VISIT MOD MDM: CPT

## 2023-09-18 RX ORDER — ALBUTEROL SULFATE 90 UG/1
2 AEROSOL, METERED RESPIRATORY (INHALATION) ONCE
Status: COMPLETED | OUTPATIENT
Start: 2023-09-18 | End: 2023-09-19

## 2023-09-19 RX ADMIN — ALBUTEROL SULFATE 2 PUFF: 90 AEROSOL, METERED RESPIRATORY (INHALATION) at 00:00

## 2023-09-19 NOTE — ED PROVIDER NOTES
History  Chief Complaint   Patient presents with   • Medication Refill     Requesting refill for inhaler. Pt reports it has been a few months since he has had it. Endorses very mild SOB today. Well appearing in triage. 34-year-old male with PMH of asthma, bipolar 1 disorder, autism spectrum disorder, schizoaffective disorder presents for refill of his albuterol inhaler. Has been feeling mildly dyspneic. Prior to Admission Medications   Prescriptions Last Dose Informant Patient Reported? Taking?    Melatonin ER 10 MG TBCR   Yes No   Sig: Take 10 mg by mouth   QUEtiapine (SEROquel) 400 MG tablet   Yes No   Sig: Take 400 mg by mouth 2 (two) times a day    albuterol (2.5 mg/3 mL) 0.083 % nebulizer solution   No No   Sig: Take 3 mL (2.5 mg total) by nebulization every 6 (six) hours as needed for wheezing or shortness of breath   albuterol (PROVENTIL HFA,VENTOLIN HFA) 90 mcg/act inhaler   No No   Sig: Inhale 1-2 puffs every 6 (six) hours as needed for wheezing   albuterol (PROVENTIL HFA,VENTOLIN HFA) 90 mcg/act inhaler   No No   Sig: Inhale 2 puffs every 6 (six) hours as needed for shortness of breath or wheezing   albuterol (Proventil HFA) 90 mcg/act inhaler   No No   Sig: Inhale 1-2 puffs every 6 (six) hours as needed for wheezing or shortness of breath   divalproex sodium (DEPAKOTE ER) 500 mg 24 hr tablet   Yes No   Sig: Take 1,000 mg by mouth daily XR formula   fluticasone (FLONASE) 50 mcg/act nasal spray   No No   Si spray into each nostril daily   hydrocortisone 1 % ointment   No No   Sig: Apply 1 g (1 application total) topically 2 (two) times a day for 3 days   loratadine (CLARITIN) 10 mg tablet   No No   Sig: Take 1 tablet (10 mg total) by mouth daily      Facility-Administered Medications: None       Past Medical History:   Diagnosis Date   • Asthma    • Autism spectrum disorder    • Bipolar 1 disorder (HCC)    • Cognitive impairment    • Depression    • Intellectual functioning disability    • Intestinal disaccharidase deficiency    • Mood swings    • Psychiatric illness    • Schizo-affective schizophrenia ([address])    • Self-injurious behavior        Past Surgical History:   Procedure Laterality Date   • FRACTURE SURGERY     • SHOULDER SURGERY         History reviewed. No pertinent family history. I have reviewed and agree with the history as documented. E-Cigarette/Vaping   • E-Cigarette Use Never User      E-Cigarette/Vaping Substances     Social History     Tobacco Use   • Smoking status: Every Day     Packs/day: 0.50     Types: Cigarettes   • Smokeless tobacco: Never   Vaping Use   • Vaping Use: Never used   Substance Use Topics   • Alcohol use: No   • Drug use: Not Currently     Types: "Crack" cocaine, Cocaine       Review of Systems   Constitutional: Negative for chills and fever. HENT: Negative for ear pain and sore throat. Eyes: Negative for pain and visual disturbance. Respiratory: Positive for shortness of breath. Negative for cough. Cardiovascular: Negative for chest pain and palpitations. Gastrointestinal: Negative for abdominal pain and vomiting. Genitourinary: Negative for dysuria and hematuria. Musculoskeletal: Negative for arthralgias and back pain. Skin: Negative for color change and rash. Neurological: Negative for seizures and syncope. All other systems reviewed and are negative. Physical Exam  Physical Exam  Vitals and nursing note reviewed. Constitutional:       General: He is not in acute distress. Appearance: He is well-developed. HENT:      Head: Normocephalic and atraumatic. Eyes:      Conjunctiva/sclera: Conjunctivae normal.   Cardiovascular:      Rate and Rhythm: Normal rate and regular rhythm. Heart sounds: No murmur heard. Pulmonary:      Effort: Pulmonary effort is normal. No respiratory distress. Breath sounds: Normal breath sounds. Abdominal:      Palpations: Abdomen is soft. Tenderness:  There is no abdominal tenderness. Musculoskeletal:         General: No swelling. Cervical back: Neck supple. Skin:     General: Skin is warm and dry. Capillary Refill: Capillary refill takes less than 2 seconds. Neurological:      Mental Status: He is alert. Psychiatric:         Attention and Perception: He is inattentive. Mood and Affect: Mood normal. Affect is flat. Behavior: Behavior is cooperative. Vital Signs  ED Triage Vitals [09/18/23 2244]   Temperature Pulse Respirations Blood Pressure SpO2   97.8 °F (36.6 °C) 104 16 (!) 171/96 98 %      Temp Source Heart Rate Source Patient Position - Orthostatic VS BP Location FiO2 (%)   Oral Monitor Sitting Right arm --      Pain Score       No Pain           Vitals:    09/18/23 2244   BP: (!) 171/96   Pulse: 104   Patient Position - Orthostatic VS: Sitting         Visual Acuity      ED Medications  Medications   albuterol (PROVENTIL HFA,VENTOLIN HFA) inhaler 2 puff (2 puffs Inhalation Given 9/19/23 0000)       Diagnostic Studies  Results Reviewed     None                 No orders to display              Procedures  Procedures         ED Course                               SBIRT 22yo+    Flowsheet Row Most Recent Value   Initial Alcohol Screen: US AUDIT-C     1. How often do you have a drink containing alcohol? 2 Filed at: 09/18/2023 2246   2. How many drinks containing alcohol do you have on a typical day you are drinking? 0 Filed at: 09/18/2023 2246   3a. Male UNDER 65: How often do you have five or more drinks on one occasion? 0 Filed at: 09/18/2023 2246   3b. FEMALE Any Age, or MALE 65+: How often do you have 4 or more drinks on one occassion? 0 Filed at: 09/18/2023 2246   Audit-C Score 2 Filed at: 09/18/2023 2246   RONN: How many times in the past year have you. .. Used an illegal drug or used a prescription medication for non-medical reasons?  Never Filed at: 09/18/2023 2246                    Medical Decision Making  79-year-old male presents for albuterol inhaler refill. We will give patient a Proventil inhaler to take home with him. Recommend he follows up with his PCP for further refills. Patient expresses understanding of the condition, treatment plan, follow-up instructions, and return precautions. Risk  Prescription drug management. Disposition  Final diagnoses:   Medication refill     Time reflects when diagnosis was documented in both MDM as applicable and the Disposition within this note     Time User Action Codes Description Comment    9/18/2023 11:43 PM Nereida Rodgers Add [Z76.0] Medication refill       ED Disposition     ED Disposition   Discharge    Condition   Stable    Date/Time   Mon Sep 18, 2023 11:43 PM    Comment   Uriel Lloyd discharge to home/self care.                Follow-up Information     Follow up With Specialties Details Why Contact Info    BRANDEE Horta Nurse Practitioner  As needed, medication refills [address]  [phone]            Discharge Medication List as of 9/18/2023 11:44 PM      CONTINUE these medications which have NOT CHANGED    Details   albuterol (2.5 mg/3 mL) 0.083 % nebulizer solution Take 3 mL (2.5 mg total) by nebulization every 6 (six) hours as needed for wheezing or shortness of breath, Starting Sun 11/21/2021, Normal      !! albuterol (Proventil HFA) 90 mcg/act inhaler Inhale 1-2 puffs every 6 (six) hours as needed for wheezing or shortness of breath, Starting Wed 1/18/2023, Normal      !! albuterol (PROVENTIL HFA,VENTOLIN HFA) 90 mcg/act inhaler Inhale 1-2 puffs every 6 (six) hours as needed for wheezing, Starting Wed 12/2/2020, Print      !! albuterol (PROVENTIL HFA,VENTOLIN HFA) 90 mcg/act inhaler Inhale 2 puffs every 6 (six) hours as needed for shortness of breath or wheezing, Starting Mon 5/15/2023, Normal      divalproex sodium (DEPAKOTE ER) 500 mg 24 hr tablet Take 1,000 mg by mouth daily XR formula, Starting Wed 4/4/2012, Historical Med      fluticasone (FLONASE) 50 mcg/act nasal spray 1 spray into each nostril daily, Starting Mon 5/15/2023, Normal      hydrocortisone 1 % ointment Apply 1 g (1 application total) topically 2 (two) times a day for 3 days, Starting Wed 2/23/2022, Until Sat 2/26/2022, Normal      loratadine (CLARITIN) 10 mg tablet Take 1 tablet (10 mg total) by mouth daily, Starting Mon 5/15/2023, Normal      Melatonin ER 10 MG TBCR Take 10 mg by mouth, Historical Med      QUEtiapine (SEROquel) 400 MG tablet Take 400 mg by mouth 2 (two) times a day , Historical Med       !! - Potential duplicate medications found. Please discuss with provider. No discharge procedures on file.     PDMP Review     None          ED Provider  Electronically Signed by           Earline Coker PA-C  09/19/23 0105

## 2023-11-03 ENCOUNTER — HOSPITAL ENCOUNTER (EMERGENCY)
Facility: HOSPITAL | Age: 33
Discharge: HOME | End: 2023-11-03
Attending: EMERGENCY MEDICINE
Payer: COMMERCIAL

## 2023-11-03 VITALS
TEMPERATURE: 98.1 F | RESPIRATION RATE: 19 BRPM | SYSTOLIC BLOOD PRESSURE: 112 MMHG | HEART RATE: 110 BPM | OXYGEN SATURATION: 97 % | DIASTOLIC BLOOD PRESSURE: 58 MMHG

## 2023-11-03 DIAGNOSIS — L70.0 CLOSED COMEDONE: Primary | ICD-10-CM

## 2023-11-03 PROCEDURE — 99282 EMERGENCY DEPT VISIT SF MDM: CPT

## 2023-11-03 PROCEDURE — 99283 EMERGENCY DEPT VISIT LOW MDM: CPT | Performed by: EMERGENCY MEDICINE

## 2023-11-03 NOTE — ED PROVIDER NOTES
History  Chief Complaint   Patient presents with    Penis Pain     Pt states "on the skin where the pee comes out I have warts"      Meliton Pike is a 78-year-old male here for bumps on the underside of his penis. Unknown duration. No pain. No h/o STD. Sexually active but uses protection. No dysuria, abdominal pain, discharge. Prior to Admission Medications   Prescriptions Last Dose Informant Patient Reported? Taking?    Melatonin ER 10 MG TBCR   Yes No   Sig: Take 10 mg by mouth   QUEtiapine (SEROquel) 400 MG tablet   Yes No   Sig: Take 400 mg by mouth 2 (two) times a day    albuterol (2.5 mg/3 mL) 0.083 % nebulizer solution   No No   Sig: Take 3 mL (2.5 mg total) by nebulization every 6 (six) hours as needed for wheezing or shortness of breath   albuterol (PROVENTIL HFA,VENTOLIN HFA) 90 mcg/act inhaler   No No   Sig: Inhale 1-2 puffs every 6 (six) hours as needed for wheezing   albuterol (PROVENTIL HFA,VENTOLIN HFA) 90 mcg/act inhaler   No No   Sig: Inhale 2 puffs every 6 (six) hours as needed for shortness of breath or wheezing   albuterol (Proventil HFA) 90 mcg/act inhaler   No No   Sig: Inhale 1-2 puffs every 6 (six) hours as needed for wheezing or shortness of breath   divalproex sodium (DEPAKOTE ER) 500 mg 24 hr tablet   Yes No   Sig: Take 1,000 mg by mouth daily XR formula   fluticasone (FLONASE) 50 mcg/act nasal spray   No No   Si spray into each nostril daily   hydrocortisone 1 % ointment   No No   Sig: Apply 1 g (1 application total) topically 2 (two) times a day for 3 days   loratadine (CLARITIN) 10 mg tablet   No No   Sig: Take 1 tablet (10 mg total) by mouth daily      Facility-Administered Medications: None       Past Medical History:   Diagnosis Date    Asthma     Autism spectrum disorder     Bipolar 1 disorder (HCC)     Cognitive impairment     Depression     Intellectual functioning disability     Intestinal disaccharidase deficiency     Mood swings     Psychiatric illness Schizo-affective schizophrenia ([address] )     Self-injurious behavior        Past Surgical History:   Procedure Laterality Date    FRACTURE SURGERY      SHOULDER SURGERY         History reviewed. No pertinent family history. I have reviewed and agree with the history as documented. E-Cigarette/Vaping    E-Cigarette Use Never User      E-Cigarette/Vaping Substances     Social History     Tobacco Use    Smoking status: Every Day     Packs/day: 0.50     Types: Cigarettes    Smokeless tobacco: Never   Vaping Use    Vaping Use: Never used   Substance Use Topics    Alcohol use: No    Drug use: Not Currently     Types: "Crack" cocaine, Cocaine       Review of Systems   Constitutional: Negative. HENT: Negative. Eyes: Negative. Respiratory: Negative. Cardiovascular: Negative. Gastrointestinal: Negative. Endocrine: Negative. Genitourinary: Negative. Musculoskeletal: Negative. Skin:  Positive for rash. Allergic/Immunologic: Negative. Neurological: Negative. Hematological: Negative. Psychiatric/Behavioral: Negative. All other systems reviewed and are negative. Physical Exam  Physical Exam  Vitals and nursing note reviewed. Exam conducted with a chaperone present. Constitutional:       Appearance: Normal appearance. He is normal weight. HENT:      Head: Normocephalic and atraumatic. Cardiovascular:      Rate and Rhythm: Normal rate and regular rhythm. Heart sounds: Normal heart sounds. Pulmonary:      Effort: Pulmonary effort is normal.      Breath sounds: Normal breath sounds. Genitourinary:     Testes: Normal.      Comments: 5 small closed comedone on underside of glans of penis. No vesicular lesions. Not tender the the touch. No other lesions, sores, discharge, testicular ttp. Chaperone present. Musculoskeletal:      Cervical back: Normal range of motion and neck supple. Skin:     General: Skin is warm. Neurological:      Mental Status: He is alert. Vital Signs  ED Triage Vitals [11/03/23 1839]   Temperature Pulse Respirations Blood Pressure SpO2   98.1 °F (36.7 °C) (!) 110 19 112/58 97 %      Temp Source Heart Rate Source Patient Position - Orthostatic VS BP Location FiO2 (%)   Oral Monitor Sitting Left arm --      Pain Score       --           Vitals:    11/03/23 1839   BP: 112/58   Pulse: (!) 110   Patient Position - Orthostatic VS: Sitting         Visual Acuity      ED Medications  Medications - No data to display    Diagnostic Studies  Results Reviewed       None                   No orders to display              Procedures  Procedures         ED Course                               SBIRT 22yo+      Flowsheet Row Most Recent Value   Initial Alcohol Screen: US AUDIT-C     1. How often do you have a drink containing alcohol? 2 Filed at: 11/03/2023 1841   2. How many drinks containing alcohol do you have on a typical day you are drinking? 0 Filed at: 11/03/2023 1841   3a. Male UNDER 65: How often do you have five or more drinks on one occasion? 0 Filed at: 11/03/2023 1841   Audit-C Score 2 Filed at: 11/03/2023 1841   RONN: How many times in the past year have you. .. Used an illegal drug or used a prescription medication for non-medical reasons? Never Filed at: 11/03/2023 1841                      Medical Decision Making  Problems Addressed:  Closed comedone:     Details: comedones on skin of penis, no signs of herpes or other STD. Disposition  Final diagnoses:   Closed comedone     Time reflects when diagnosis was documented in both MDM as applicable and the Disposition within this note       Time User Action Codes Description Comment    11/3/2023  6:43 PM Lakshmi Cooley Add [L70.0] Closed comedone           ED Disposition       ED Disposition   Discharge    Condition   Stable    Date/Time   Fri Nov 3, 2023 1842    [address] discharge to home/self care.                    Follow-up Information       Follow up With Specialties Details Why Contact Info    BRANDEE Fang Nurse Practitioner   [address] [facility] [place] [address]  [phone] [address] were seen for clogged pores on your penis. Please try and keep the area clean and make sure to wash the area daily. Continue to practice safe sex. Discharge Medication List as of 11/3/2023  6:44 PM        CONTINUE these medications which have NOT CHANGED    Details   albuterol (2.5 mg/3 mL) 0.083 % nebulizer solution Take 3 mL (2.5 mg total) by nebulization every 6 (six) hours as needed for wheezing or shortness of breath, Starting Sun 11/21/2021, Normal      !! albuterol (Proventil HFA) 90 mcg/act inhaler Inhale 1-2 puffs every 6 (six) hours as needed for wheezing or shortness of breath, Starting Wed 1/18/2023, Normal      !! albuterol (PROVENTIL HFA,VENTOLIN HFA) 90 mcg/act inhaler Inhale 1-2 puffs every 6 (six) hours as needed for wheezing, Starting Wed 12/2/2020, Print      !! albuterol (PROVENTIL HFA,VENTOLIN HFA) 90 mcg/act inhaler Inhale 2 puffs every 6 (six) hours as needed for shortness of breath or wheezing, Starting Mon 5/15/2023, Normal      divalproex sodium (DEPAKOTE ER) 500 mg 24 hr tablet Take 1,000 mg by mouth daily XR formula, Starting Wed 4/4/2012, Historical Med      fluticasone (FLONASE) 50 mcg/act nasal spray 1 spray into each nostril daily, Starting Mon 5/15/2023, Normal      hydrocortisone 1 % ointment Apply 1 g (1 application total) topically 2 (two) times a day for 3 days, Starting Wed 2/23/2022, Until Sat 2/26/2022, Normal      loratadine (CLARITIN) 10 mg tablet Take 1 tablet (10 mg total) by mouth daily, Starting Mon 5/15/2023, Normal      Melatonin ER 10 MG TBCR Take 10 mg by mouth, Historical Med      QUEtiapine (SEROquel) 400 MG tablet Take 400 mg by mouth 2 (two) times a day , Historical Med       !! - Potential duplicate medications found. Please discuss with provider. No discharge procedures on file.     PDMP Review None            ED Provider  Electronically Signed by             Benjamin Malik MD  11/03/23 9878

## 2024-03-08 ENCOUNTER — HOSPITAL ENCOUNTER (EMERGENCY)
Facility: HOSPITAL | Age: 34
Discharge: HOME | End: 2024-03-08
Attending: EMERGENCY MEDICINE
Payer: COMMERCIAL

## 2024-03-08 VITALS
DIASTOLIC BLOOD PRESSURE: 98 MMHG | BODY MASS INDEX: 32.38 KG/M2 | RESPIRATION RATE: 16 BRPM | SYSTOLIC BLOOD PRESSURE: 162 MMHG | HEART RATE: 96 BPM | OXYGEN SATURATION: 97 % | TEMPERATURE: 97.7 F | WEIGHT: 165.79 LBS

## 2024-03-08 DIAGNOSIS — Z76.0 MEDICATION REFILL: Primary | ICD-10-CM

## 2024-03-08 DIAGNOSIS — J45.901 ASTHMA EXACERBATION: ICD-10-CM

## 2024-03-08 PROCEDURE — 99281 EMR DPT VST MAYX REQ PHY/QHP: CPT

## 2024-03-08 PROCEDURE — 99284 EMERGENCY DEPT VISIT MOD MDM: CPT | Performed by: PHYSICIAN ASSISTANT

## 2024-03-08 RX ORDER — ALBUTEROL SULFATE 90 UG/1
1-2 AEROSOL, METERED RESPIRATORY (INHALATION) EVERY 6 HOURS PRN
Qty: 8 G | Refills: 0 | Status: SHIPPED | OUTPATIENT
Start: 2024-03-08

## 2024-03-08 RX ORDER — ALBUTEROL SULFATE 90 UG/1
2 AEROSOL, METERED RESPIRATORY (INHALATION) ONCE
Status: COMPLETED | OUTPATIENT
Start: 2024-03-08 | End: 2024-03-08

## 2024-03-08 RX ADMIN — ALBUTEROL SULFATE 2 PUFF: 90 AEROSOL, METERED RESPIRATORY (INHALATION) at 19:52

## 2024-03-09 NOTE — DISCHARGE INSTRUCTIONS
DISCHARGE INSTRUCTIONS:    FOLLOW UP WITH YOUR PRIMARY CARE PROVIDER OR THE [facility]. MAKE AN APPOINTMENT TO BE SEEN.     USE MEDICATION AS PRESCRIBED. IF RASH, SHORTNESS OF BREATH OR TROUBLE SWALLOWING, STOP TAKING THE MEDICATION AND BE SEEN.     REST AND DRINK PLENTY OF FLUIDS.    IF SYMPTOMS WORSEN OR NEW SYMPTOMS ARISE, RETURN TO THE ER TO BE SEEN.

## 2024-03-09 NOTE — ED PROVIDER NOTES
History  Chief Complaint   Patient presents with    Shortness of Breath     Pt reports feeling short of breath for the past week. Has no refills of his inhaler at home. -cough.      33y.o male with PMH of asthma, autism, bipolar, cognitive impairment, depression, intellectual functioning disability, schizo-affective schizophrenia and self-injurious behavior presents to the ER for a medication refill. Patient is requesting a refill of his Albuterol inhaler. Patient states when he is exerting himself, he feels short of breath. He denies any symptoms currently. Patient states he misplaced his last inhaler. He denies fever, chills, URI symptoms, chest pain, dyspnea currently, N/V/D, abdominal pain, weakness or paresthesias. He denies PE risk factors. Patient states when he does have symptoms, it feels like his typical asthma.       History provided by:  Patient   used: No        Prior to Admission Medications   Prescriptions Last Dose Informant Patient Reported? Taking?   Melatonin ER 10 MG TBCR   Yes No   Sig: Take 10 mg by mouth   QUEtiapine (SEROquel) 400 MG tablet   Yes No   Sig: Take 400 mg by mouth 2 (two) times a day    albuterol (2.5 mg/3 mL) 0.083 % nebulizer solution   No No   Sig: Take 3 mL (2.5 mg total) by nebulization every 6 (six) hours as needed for wheezing or shortness of breath   albuterol (PROVENTIL HFA,VENTOLIN HFA) 90 mcg/act inhaler   No No   Sig: Inhale 1-2 puffs every 6 (six) hours as needed for wheezing   albuterol (PROVENTIL HFA,VENTOLIN HFA) 90 mcg/act inhaler   No No   Sig: Inhale 2 puffs every 6 (six) hours as needed for shortness of breath or wheezing   albuterol (PROVENTIL HFA,VENTOLIN HFA) 90 mcg/act inhaler   No Yes   Sig: Inhale 1-2 puffs every 6 (six) hours as needed for wheezing or shortness of breath   albuterol (Proventil HFA) 90 mcg/act inhaler   No No   Sig: Inhale 1-2 puffs every 6 (six) hours as needed for wheezing or shortness of breath   divalproex sodium  "(DEPAKOTE ER) 500 mg 24 hr tablet   Yes No   Sig: Take 1,000 mg by mouth daily XR formula   fluticasone (FLONASE) 50 mcg/act nasal spray   No No   Si spray into each nostril daily   hydrocortisone 1 % ointment   No No   Sig: Apply 1 g (1 application total) topically 2 (two) times a day for 3 days   loratadine (CLARITIN) 10 mg tablet   No No   Sig: Take 1 tablet (10 mg total) by mouth daily      Facility-Administered Medications: None       Past Medical History:   Diagnosis Date    Asthma     Autism spectrum disorder     Bipolar 1 disorder (HCC)     Cognitive impairment     Depression     Intellectual functioning disability     Intestinal disaccharidase deficiency     Mood swings     Psychiatric illness     Schizo-affective schizophrenia (HCC)     Self-injurious behavior        Past Surgical History:   Procedure Laterality Date    FRACTURE SURGERY      SHOULDER SURGERY         History reviewed. No pertinent family history.  I have reviewed and agree with the history as documented.    E-Cigarette/Vaping    E-Cigarette Use Never User      E-Cigarette/Vaping Substances     Social History     Tobacco Use    Smoking status: Every Day     Current packs/day: 0.50     Types: Cigarettes    Smokeless tobacco: Never   Vaping Use    Vaping status: Never Used   Substance Use Topics    Alcohol use: No    Drug use: Not Currently     Types: \"Crack\" cocaine, Cocaine       Review of Systems   Constitutional:  Negative for activity change, appetite change, chills and fever.   HENT:  Negative for congestion, drooling, ear discharge, ear pain, facial swelling, rhinorrhea and sore throat.    Eyes:  Negative for redness.   Respiratory:  Negative for cough and shortness of breath.    Cardiovascular:  Negative for chest pain.   Gastrointestinal:  Negative for abdominal pain, diarrhea, nausea and vomiting.   Musculoskeletal:  Negative for neck stiffness.   Skin:  Negative for rash.   Allergic/Immunologic: Positive for food allergies. "   Neurological:  Negative for weakness and numbness.       Physical Exam  Physical Exam  Vitals and nursing note reviewed.   Constitutional:       General: He is not in acute distress.     Appearance: He is not toxic-appearing.   HENT:      Head: Normocephalic and atraumatic.   Eyes:      Conjunctiva/sclera: Conjunctivae normal.   Neck:      Trachea: No tracheal deviation.   Cardiovascular:      Rate and Rhythm: Normal rate and regular rhythm.      Heart sounds: Normal heart sounds, S1 normal and S2 normal. No murmur heard.     No friction rub. No gallop.   Pulmonary:      Effort: Pulmonary effort is normal. No respiratory distress.      Breath sounds: Normal breath sounds. No decreased breath sounds, wheezing, rhonchi or rales.   Chest:      Chest wall: No tenderness.   Abdominal:      General: There is no distension.   Musculoskeletal:      Cervical back: Normal range of motion and neck supple.   Skin:     General: Skin is warm and dry.      Findings: No rash.   Neurological:      Mental Status: He is alert.      GCS: GCS eye subscore is 4. GCS verbal subscore is 5. GCS motor subscore is 6.   Psychiatric:         Mood and Affect: Mood normal.         Vital Signs  ED Triage Vitals   Temperature Pulse Respirations Blood Pressure SpO2   03/08/24 1918 03/08/24 1917 03/08/24 1917 03/08/24 1917 03/08/24 1917   97.7 °F (36.5 °C) (!) 107 16 162/98 97 %      Temp Source Heart Rate Source Patient Position - Orthostatic VS BP Location FiO2 (%)   03/08/24 1918 03/08/24 1917 03/08/24 1917 03/08/24 1917 --   Oral Monitor Sitting Right arm       Pain Score       03/08/24 1917       No Pain           Vitals:    03/08/24 1917 03/08/24 1935   BP: 162/98    Pulse: (!) 107 96   Patient Position - Orthostatic VS: Sitting          Visual Acuity      ED Medications  Medications   albuterol (PROVENTIL HFA,VENTOLIN HFA) inhaler 2 puff (2 puffs Inhalation Given 3/8/24 1952)       Diagnostic Studies  Results Reviewed       None                    No orders to display              Procedures  Procedures         ED Course                               SBIRT 20yo+      Flowsheet Row Most Recent Value   Initial Alcohol Screen: US AUDIT-C     1. How often do you have a drink containing alcohol? 0 Filed at: 03/08/2024 1929   2. How many drinks containing alcohol do you have on a typical day you are drinking?  0 Filed at: 03/08/2024 1929   3a. Male UNDER 65: How often do you have five or more drinks on one occasion? 0 Filed at: 03/08/2024 1929   Audit-C Score 0 Filed at: 03/08/2024 1929   RONN: How many times in the past year have you...    Used an illegal drug or used a prescription medication for non-medical reasons? Never Filed at: 03/08/2024 1929                      Medical Decision Making  33y.o male presents to the ER for medication refill. Vitals are stable. Patient is in no acute distress. On exam, breathing is non-labored. No tachypnea or accessory muscle use. Lungs are clear. Heart is regular rate and rhythm. Abdomen is not distended. Will give inhaler here and send prescription to pharmacy.     The management plan was discussed in detail with the patient at bedside and all questions were answered.  Prior to discharge, we provided both verbal and written instructions.  We discussed with the patient the signs and symptoms for which to return to the emergency department.  All questions were answered and patient was comfortable with the plan of care and discharged to home.  Instructed the patient to follow up with the primary care provider and/or specialist provided and their written instructions.  The patient verbalized understanding of our discussion and plan of care, and agrees to return to the Emergency Department for concerns and progression of illness.    At discharge, I instructed the patient to:  -follow up with pcp  -use Albuterol inhaler as prescribed  -rest and drink plenty of fluids  -return to the ER if symptoms worsened or new symptoms  arose  Patient agreed to this plan and was stable at time of discharge.     Problems Addressed:  Medication refill: acute illness or injury    Amount and/or Complexity of Data Reviewed  Independent Historian:      Details: Patient is historian    Risk  Prescription drug management.             Disposition  Final diagnoses:   Medication refill     Time reflects when diagnosis was documented in both MDM as applicable and the Disposition within this note       Time User Action Codes Description Comment    3/8/2024  7:35 PM Sindhu Collins Add [Z76.0] Medication refill     3/8/2024  7:35 PM Sindhu Collins Add [J45.901] Asthma exacerbation           ED Disposition       ED Disposition   Discharge    Condition   Stable    Date/Time   Fri Mar 8, 2024 1935    Comment   Khai Betty discharge to home/self care.                   Follow-up Information       Follow up With Specialties Details Why Contact Info    BRANEDE Lee Nurse Practitioner Schedule an appointment as soon as possible for a visit   [address] [place] [id number]  [phone]              Current Discharge Medication List        CONTINUE these medications which have CHANGED    Details   albuterol (PROVENTIL HFA,VENTOLIN HFA) 90 mcg/act inhaler Inhale 1-2 puffs every 6 (six) hours as needed for wheezing or shortness of breath  Qty: 8 g, Refills: 0    Comments: Substitution to a formulary equivalent within the same pharmaceutical class is authorized.  Associated Diagnoses: Medication refill           CONTINUE these medications which have NOT CHANGED    Details   albuterol (2.5 mg/3 mL) 0.083 % nebulizer solution Take 3 mL (2.5 mg total) by nebulization every 6 (six) hours as needed for wheezing or shortness of breath  Qty: 75 mL, Refills: 0    Associated Diagnoses: Asthma exacerbation      divalproex sodium (DEPAKOTE ER) 500 mg 24 hr tablet Take 1,000 mg by mouth daily XR formula      fluticasone (FLONASE) 50 mcg/act nasal spray 1  spray into each nostril daily  Qty: 16 g, Refills: 0    Associated Diagnoses: Seasonal allergies      hydrocortisone 1 % ointment Apply 1 g (1 application total) topically 2 (two) times a day for 3 days  Qty: 6 g, Refills: 0    Associated Diagnoses: Dermatitis      loratadine (CLARITIN) 10 mg tablet Take 1 tablet (10 mg total) by mouth daily  Qty: 20 tablet, Refills: 0    Associated Diagnoses: Seasonal allergies      Melatonin ER 10 MG TBCR Take 10 mg by mouth      QUEtiapine (SEROquel) 400 MG tablet Take 400 mg by mouth 2 (two) times a day              No discharge procedures on file.    PDMP Review       None            ED Provider  Electronically Signed by             Sindhu Collins PA-C  03/08/24 2003

## 2024-03-19 ENCOUNTER — DOCUMENTATION (OUTPATIENT)
Dept: INTERNAL MEDICINE CLINIC | Facility: OTHER | Age: 34
End: 2024-03-19

## 2024-04-16 NOTE — PROGRESS NOTES
"Pt presented to the [facility] the [facility] for a Nurse visit.  I am acting as a scribe for SANDOR Edgar RN      \"Khai presented to the [facility] the [facility] on 3/19/2024 requesting to discuss a medical concern with a nurse. He gave me permission to view his EMR in Epic, as he was not sure if he had a PCP. Khai stated that about 1 month ago, he was bitten by a dog near his left ear. The skin was broken but has since healed. He did not seek treatment at the time of the injury. Khai is concerned about a bump at the site of the bite. He states it itches but is not painful, red, warm, or draining. It has not changed in size. He has not had a fever. Where indicated, there is a raised skin-colored lesion about 0.5 x 0.5 cm just anterior to his left ear lobe. It is firm, non-fluctuant, not erythematous, and exhibiting no other signs of infection at this time. It has the appearance of a healing wound or keloid. Upon review of his chart, Khai does have a PCP, he established care with Kate Hernandez MD on 10/19/2023 at [facility] [address]. Khai has a follow up appointment already scheduled for 4/19/2024. Khai will address this concern with his PCP at his upcoming appointment, sooner if symptoms worsen, change, or fail to improve. After triaging Khai’s concerns, there was no need for urgent treatment.\"    Clarice Edgar, RACHEAL, RN       "

## 2024-04-24 ENCOUNTER — HOSPITAL ENCOUNTER (EMERGENCY)
Facility: HOSPITAL | Age: 34
Discharge: HOME | End: 2024-04-24
Attending: EMERGENCY MEDICINE
Payer: COMMERCIAL

## 2024-04-24 VITALS
TEMPERATURE: 97.8 F | OXYGEN SATURATION: 98 % | RESPIRATION RATE: 16 BRPM | SYSTOLIC BLOOD PRESSURE: 155 MMHG | DIASTOLIC BLOOD PRESSURE: 107 MMHG | HEART RATE: 106 BPM | WEIGHT: 169.31 LBS | BODY MASS INDEX: 33.07 KG/M2

## 2024-04-24 DIAGNOSIS — Z76.0 MEDICATION REFILL: Primary | ICD-10-CM

## 2024-04-24 PROCEDURE — 99284 EMERGENCY DEPT VISIT MOD MDM: CPT | Performed by: EMERGENCY MEDICINE

## 2024-04-24 PROCEDURE — 99282 EMERGENCY DEPT VISIT SF MDM: CPT

## 2024-04-24 RX ORDER — ALBUTEROL SULFATE 90 UG/1
2 AEROSOL, METERED RESPIRATORY (INHALATION) EVERY 6 HOURS PRN
Qty: 6.7 G | Refills: 0 | Status: SHIPPED | OUTPATIENT
Start: 2024-04-24

## 2024-04-24 RX ORDER — ALBUTEROL SULFATE 90 UG/1
2 AEROSOL, METERED RESPIRATORY (INHALATION) ONCE
Status: COMPLETED | OUTPATIENT
Start: 2024-04-24 | End: 2024-04-24

## 2024-04-24 RX ADMIN — ALBUTEROL SULFATE 2 PUFF: 90 AEROSOL, METERED RESPIRATORY (INHALATION) at 18:44

## 2024-04-24 NOTE — ED PROVIDER NOTES
"History  Chief Complaint   Patient presents with    Medication Refill     Pt complaining of worsening asthma. Denies sob rn, requesting a refill of his inhaler. Denies needing to be seen medically, \"just need an inhaler\".      Patient is a 34-year-old male coming in today for medication refill.  He has a history of autism, bipolar, mood swings and schizoaffective.  He states that he is out of his inhaler.  He reports that this time that he is not short of breath or having any fevers, chills, night sweats.  He states that he ran out of his inhaler and just needs a refill.  Offered patient further workup.  Patient declined      History provided by:  Patient and medical records   used: No        Prior to Admission Medications   Prescriptions Last Dose Informant Patient Reported? Taking?   Melatonin ER 10 MG TBCR   Yes No   Sig: Take 10 mg by mouth   QUEtiapine (SEROquel) 400 MG tablet   Yes No   Sig: Take 400 mg by mouth 2 (two) times a day    albuterol (2.5 mg/3 mL) 0.083 % nebulizer solution   No No   Sig: Take 3 mL (2.5 mg total) by nebulization every 6 (six) hours as needed for wheezing or shortness of breath   albuterol (PROVENTIL HFA,VENTOLIN HFA) 90 mcg/act inhaler   No No   Sig: Inhale 1-2 puffs every 6 (six) hours as needed for wheezing or shortness of breath   divalproex sodium (DEPAKOTE ER) 500 mg 24 hr tablet   Yes No   Sig: Take 1,000 mg by mouth daily XR formula   fluticasone (FLONASE) 50 mcg/act nasal spray   No No   Si spray into each nostril daily   hydrocortisone 1 % ointment   No No   Sig: Apply 1 g (1 application total) topically 2 (two) times a day for 3 days   loratadine (CLARITIN) 10 mg tablet   No No   Sig: Take 1 tablet (10 mg total) by mouth daily      Facility-Administered Medications: None       Past Medical History:   Diagnosis Date    Asthma     Autism spectrum disorder     Bipolar 1 disorder (HCC)     Cognitive impairment     Depression     Intellectual functioning " "disability     Intestinal disaccharidase deficiency     Mood swings     Psychiatric illness     Schizo-affective schizophrenia (HCC)     Self-injurious behavior        Past Surgical History:   Procedure Laterality Date    FRACTURE SURGERY      SHOULDER SURGERY         History reviewed. No pertinent family history.  I have reviewed and agree with the history as documented.    E-Cigarette/Vaping    E-Cigarette Use Never User      E-Cigarette/Vaping Substances    Nicotine No     THC No     CBD No     Flavoring No     Other No     Unknown No      Social History     Tobacco Use    Smoking status: Every Day     Current packs/day: 0.50     Types: Cigarettes    Smokeless tobacco: Never   Vaping Use    Vaping status: Never Used   Substance Use Topics    Alcohol use: No    Drug use: Not Currently     Types: \"Crack\" cocaine, Cocaine       Review of Systems   Constitutional:  Negative for chills and fever.   HENT:  Negative for ear pain and sore throat.    Eyes:  Negative for pain and visual disturbance.   Respiratory:  Negative for cough and shortness of breath.    Cardiovascular:  Negative for chest pain and palpitations.   Gastrointestinal:  Negative for abdominal pain and vomiting.   Genitourinary:  Negative for dysuria and hematuria.   Musculoskeletal:  Negative for arthralgias and back pain.   Skin:  Negative for color change and rash.   Neurological:  Negative for seizures and syncope.   All other systems reviewed and are negative.      Physical Exam  Physical Exam  Vitals and nursing note reviewed.   Constitutional:       General: He is not in acute distress.     Appearance: He is well-developed.   HENT:      Head: Normocephalic and atraumatic.   Eyes:      Conjunctiva/sclera: Conjunctivae normal.   Cardiovascular:      Rate and Rhythm: Normal rate and regular rhythm.      Heart sounds: Normal heart sounds, S1 normal and S2 normal. No murmur heard.  Pulmonary:      Effort: Pulmonary effort is normal. No respiratory " distress.      Breath sounds: Normal breath sounds.      Comments: Patient's lungs are clear to auscultation.  No respiratory distress and no conversational dyspnea  Abdominal:      Palpations: Abdomen is soft.      Tenderness: There is no abdominal tenderness.   Musculoskeletal:         General: No swelling.      Cervical back: Neck supple.      Right lower leg: No edema.      Left lower leg: No edema.   Skin:     General: Skin is warm and dry.      Capillary Refill: Capillary refill takes less than 2 seconds.   Neurological:      Mental Status: He is alert.   Psychiatric:         Mood and Affect: Mood normal.         Vital Signs  ED Triage Vitals [04/24/24 1822]   Temperature Pulse Respirations Blood Pressure SpO2   97.8 °F (36.6 °C) (!) 109 18 (!) 168/103 95 %      Temp Source Heart Rate Source Patient Position - Orthostatic VS BP Location FiO2 (%)   Oral Monitor Sitting Right arm --      Pain Score       --           Vitals:    04/24/24 1822 04/24/24 1844   BP: (!) 168/103 (!) 155/107   Pulse: (!) 109 (!) 106   Patient Position - Orthostatic VS: Sitting Sitting         Visual Acuity      ED Medications  Medications   albuterol (PROVENTIL HFA,VENTOLIN HFA) inhaler 2 puff (2 puffs Inhalation Given 4/24/24 1844)       Diagnostic Studies  Results Reviewed       None                   No orders to display              Procedures  Procedures         ED Course  ED Course as of 04/24/24 2103 Wed Apr 24, 2024 1834 Patient is a 34-year-old male coming in today asking for albuterol refill.  He states that he has been more short of breath.  He declines any workup.  On exam he is resting in bed in no acute distress.  Lungs are clear to auscultation.  He is not tachycardic and neurologically intact.  Will give albuterol and sent in for PCP P follow-up as well                               SBIRT 20yo+      Flowsheet Row Most Recent Value   Initial Alcohol Screen: US AUDIT-C     1. How often do you have a drink containing  alcohol? 0 Filed at: 04/24/2024 1846   2. How many drinks containing alcohol do you have on a typical day you are drinking?  0 Filed at: 04/24/2024 1846   3a. Male UNDER 65: How often do you have five or more drinks on one occasion? 0 Filed at: 04/24/2024 1846   Audit-C Score 0 Filed at: 04/24/2024 1846   RONN: How many times in the past year have you...    Used an illegal drug or used a prescription medication for non-medical reasons? Never Filed at: 04/24/2024 1846                      Medical Decision Making  Risk  Prescription drug management.             Disposition  Final diagnoses:   Medication refill     Time reflects when diagnosis was documented in both MDM as applicable and the Disposition within this note       Time User Action Codes Description Comment    4/24/2024  6:33 PM Marni Ruiz Add [Z76.0] Medication refill           ED Disposition       ED Disposition   Discharge    Condition   Stable    Date/Time   Wed Apr 24, 2024 1833    Comment   Khai Hernández discharge to home/self care.                   Follow-up Information       Follow up With Specialties Details Why Contact Info    BRANDEE Lee Nurse Practitioner Schedule an appointment as soon as possible for a visit in 1 week  [address] [place] [id number]  [phone]              Discharge Medication List as of 4/24/2024  6:34 PM        START taking these medications    Details   !! albuterol (Proventil HFA) 90 mcg/act inhaler Inhale 2 puffs every 6 (six) hours as needed for wheezing, Starting Wed 4/24/2024, Normal       !! - Potential duplicate medications found. Please discuss with provider.        CONTINUE these medications which have NOT CHANGED    Details   albuterol (2.5 mg/3 mL) 0.083 % nebulizer solution Take 3 mL (2.5 mg total) by nebulization every 6 (six) hours as needed for wheezing or shortness of breath, Starting Sun 11/21/2021, Normal      !! albuterol (PROVENTIL HFA,VENTOLIN HFA) 90 mcg/act inhaler Inhale  1-2 puffs every 6 (six) hours as needed for wheezing or shortness of breath, Starting Fri 3/8/2024, Normal      divalproex sodium (DEPAKOTE ER) 500 mg 24 hr tablet Take 1,000 mg by mouth daily XR formula, Starting Wed 4/4/2012, Historical Med      fluticasone (FLONASE) 50 mcg/act nasal spray 1 spray into each nostril daily, Starting Mon 5/15/2023, Normal      hydrocortisone 1 % ointment Apply 1 g (1 application total) topically 2 (two) times a day for 3 days, Starting Wed 2/23/2022, Until Sat 2/26/2022, Normal      loratadine (CLARITIN) 10 mg tablet Take 1 tablet (10 mg total) by mouth daily, Starting Mon 5/15/2023, Normal      Melatonin ER 10 MG TBCR Take 10 mg by mouth, Historical Med      QUEtiapine (SEROquel) 400 MG tablet Take 400 mg by mouth 2 (two) times a day , Historical Med       !! - Potential duplicate medications found. Please discuss with provider.          No discharge procedures on file.    PDMP Review       None            ED Provider  Electronically Signed by             Marni Ruiz DO  04/24/24 3734

## 2024-04-24 NOTE — Clinical Note
Khai Hernández was seen and treated in our emergency department on 4/24/2024.                Diagnosis:     Khai  .    He may return on this date: 04/26/2024         If you have any questions or concerns, please don't hesitate to call.      Marni Ruiz, DO    ______________________________           _______________          _______________  Hospital Representative                              Date                                Time

## 2024-05-31 ENCOUNTER — OFFICE VISIT (OUTPATIENT)
Dept: DENTISTRY | Facility: CLINIC | Age: 34
End: 2024-05-31

## 2024-05-31 VITALS — TEMPERATURE: 98.2 F | HEART RATE: 112 BPM | SYSTOLIC BLOOD PRESSURE: 138 MMHG | DIASTOLIC BLOOD PRESSURE: 93 MMHG

## 2024-05-31 DIAGNOSIS — K08.9 EXTRACTION OF TOOTH NEEDED: Primary | ICD-10-CM

## 2024-05-31 PROCEDURE — D0140 LIMITED ORAL EVALUATION - PROBLEM FOCUSED: HCPCS

## 2024-05-31 NOTE — PROGRESS NOTES
Dental procedures in this visit     - LIMITED ORAL EVALUATION - PROBLEM FOCUSED (Completed)     Service provider: Johnny Meier DMD     Billing provider: Johnny Meier DMD     Subjective   Patient ID: Khai Hernández is a 34 y.o. male.  No chief complaint on file.    HPI  The following portions of the chart were reviewed this encounter and updated as appropriate:    Tobacco  Allergies  Meds  Problems  Med Hx  Surg Hx  Fam Hx           Objective   Soft Tissue Exam  No findings documented this visit      Dental Exam    Hard Tissue Exam:  Gross/rampant decay and Decay noted - see charting and treatment plan  Reference tooth chart for additional findings.    Assessment & Plan   Problem List Items Addressed This Visit    None  Visit Diagnoses       Extraction of tooth needed    -  Primary    Relevant Orders    LIMITED ORAL EVALUATION - PROBLEM FOCUSED (Completed)    Ambulatory referral to Oral Maxillofacial Surgery        33 yo male with special needs presents with father with chief complaint of needing an updated referral for OS extractions with sedation. After clinical and radiographic exam, #13, 21, 28, 30 warrant extraction. An updated OS referral, x-ray and provider sheet was provided.     Patient has extreme anxiety when it comes to the sensation of metal instruments on teeth. Due to special needs and lower frankl score, OR dentistry with general sedation is recommend. An kevin was sent to Dr. CHARLTON for consultation.    In the mean time, SDF application is recommend for all carious lesions starting with the upper anterior's.    NV: SDF application upper anterior's

## 2024-06-06 NOTE — ED PROVIDER NOTES
History  Chief Complaint   Patient presents with    Finger Injury     Pt presents with R 2nd digit pain and swelling  after slipping and falling  Pt denies any other complaint  Patient is a 19-year-old male reported to emergency room with complaint of right index finger injury  Yesterday he got his finger stuck between a crack of the porch and when he was pulling the finger out he felt poop  Ever since that time the finger is swollen, tender and has some associated limited range of motion  Good capillary refill, good pulses throughout right upper extremity  The patient has no pain in the right wrist, elbow or shoulder  Appears in no acute distress  Denies fevers, chills, sweats  No paresthesia, numbness, tingling to upper lower extremities  No headache, neck stiffness, vision changes  Past medical history noncontributory  Stable while in ED  X-ray reviewed and shows no acute fractures or dislocations  I have provided prescriptions for Keflex due to significant erythema I am suspecting possible cellulitis  Splint applied  Prior to Admission Medications   Prescriptions Last Dose Informant Patient Reported? Taking? QUEtiapine (SEROquel) 200 mg tablet   Yes No   Sig: Take 200 mg by mouth    cloNIDine (CATAPRES) 0 1 mg tablet   Yes No   Sig: Take 0 1 mg by mouth  divalproex sodium (DEPAKOTE ER) 250 mg 24 hr tablet   Yes No   Sig: Take 250 mg by mouth  fluticasone (FLONASE) 50 mcg/act nasal spray   No No   Si spray into each nostril daily   pantoprazole (PROTONIX) 40 mg tablet   No No   Sig: Take 1 tablet (40 mg total) by mouth daily  venlafaxine (EFFEXOR-XR) 150 mg 24 hr capsule   Yes No   Sig: Take 150 mg by mouth  Facility-Administered Medications: None       Past Medical History:   Diagnosis Date    Asthma     Depression     Mood swings ([facility] [address] )        History reviewed  No pertinent surgical history  History reviewed  No pertinent family history    I have reviewed and agree with the history as documented  Social History   Substance Use Topics    Smoking status: Current Every Day Smoker     Packs/day: 0 20     Types: Cigarettes    Smokeless tobacco: Never Used    Alcohol use No        Review of Systems   Constitutional: Negative  Negative for chills, fatigue and fever  HENT: Negative  Eyes: Negative  Respiratory: Negative  Cardiovascular: Negative  Gastrointestinal: Negative  Endocrine: Negative  Genitourinary: Negative  Skin: Negative  Physical Exam  ED Triage Vitals [03/15/18 1059]   Temperature Pulse Respirations Blood Pressure SpO2   (!) 97 °F (36 1 °C) 82 18 150/94 98 %      Temp src Heart Rate Source Patient Position - Orthostatic VS BP Location FiO2 (%)   -- -- -- -- --      Pain Score       Worst Possible Pain           Orthostatic Vital Signs  Vitals:    03/15/18 1059   BP: 150/94   Pulse: 82       Physical Exam   Constitutional: He is oriented to person, place, and time  He appears well-developed and well-nourished  HENT:   Head: Normocephalic and atraumatic  Eyes: EOM are normal  Pupils are equal, round, and reactive to light  Neck: Normal range of motion  Neck supple  Cardiovascular: Normal rate and regular rhythm  Pulmonary/Chest: Effort normal and breath sounds normal    Abdominal: Soft  Bowel sounds are normal    Musculoskeletal: Normal range of motion  Neurological: He is alert and oriented to person, place, and time  Skin: Skin is warm and dry  No rash noted  There is erythema  ED Medications  Medications - No data to display    Diagnostic Studies  Results Reviewed     None                 XR finger second digit-index RIGHT   ED Interpretation by Maria M Hopkins PA-C (03/15 0166)   No fractures or dislocations      Final Result by Gustavo Carson MD (03/15 0591)      No fracture  Findings concur with the preliminary report by the referring clinician already in PACS and/or our electronic record EPIC  Workstation performed: [id number]                    Procedures  Procedures       Phone Contacts  ED Phone Contact    ED Course  ED Course                                MDM  Number of Diagnoses or Management Options  Cellulitis:   Other sprain of right index finger:   Diagnosis management comments: I have reviewed the x-ray which shows no acute fractures or dislocations  Splint applied for stability and pain control  I have provided patient with Keflex prescription and asked to started in 24 hours is swelling and erythema is not improving  I am suspecting cellulitis based on my physical examination  Follow up with PCP in the next 2-3 days highly recommended  Amount and/or Complexity of Data Reviewed  Tests in the radiology section of CPT®: ordered and reviewed    Patient Progress  Patient progress: stable    CritCare Time    Disposition  Final diagnoses:   Other sprain of right index finger   Cellulitis     Time reflects when diagnosis was documented in both MDM as applicable and the Disposition within this note     Time User Action Codes Description Comment    3/15/2018 12:01 PM [facility] [address] [facility] [[id number]] Other sprain of right index finger     3/15/2018 12:01 PM [facility] [address] [facility] [X12 [id number]] Cellulitis       ED Disposition     ED Disposition Condition Comment    Discharge  Yany Watson discharge to home/self care  Condition at discharge: Good    Follow-up with primary care for further evaluation and management in the next 72 hours  Keep the splint on for no longer than 7 days  I will provide you with Keflex prescriptio n, antibiotic, we worsening swelling tenderness or increased erythema started the antibiotic 4 times a day for total of 10 days  Return to emergency room immediately with worsening symptoms          Follow-up Information    None       Discharge Medication List as of 3/15/2018 12:06 PM      START taking these medications    Details   cephalexin (KEFLEX) 250 mg capsule One cap 4 times a day with food for 10 days, Print         CONTINUE these medications which have NOT CHANGED    Details   cloNIDine (CATAPRES) 0 1 mg tablet Take 0 1 mg by mouth , Until Discontinued, Historical Med      divalproex sodium (DEPAKOTE ER) 250 mg 24 hr tablet Take 250 mg by mouth , Starting 7/25/2016, Until Discontinued, Historical Med      fluticasone (FLONASE) 50 mcg/act nasal spray 1 spray into each nostril daily, Starting 10/31/2016, Until Discontinued, Print      pantoprazole (PROTONIX) 40 mg tablet Take 1 tablet (40 mg total) by mouth daily  , Starting 9/4/2016, Until Discontinued, Print      QUEtiapine (SEROquel) 200 mg tablet Take 200 mg by mouth , Starting 2/23/2016, Until Discontinued, Historical Med      venlafaxine (EFFEXOR-XR) 150 mg 24 hr capsule Take 150 mg by mouth , Starting 7/25/2016, Until Discontinued, Historical Med           No discharge procedures on file      ED Provider  Electronically Signed by           Onesimo Falk PA-C  03/15/18 [facility]  03/23/18 9744 tyrone

## 2024-06-11 ENCOUNTER — PATIENT OUTREACH (OUTPATIENT)
Dept: OTHER | Facility: OTHER | Age: 34
End: 2024-06-11

## 2024-06-11 NOTE — PROGRESS NOTES
Client presented to [facility] mobile van during [employer]. Client requested BP check.  Vitals taken by nursing student.     BP: 138/98 right arm, client sitting; Client states BP usually runs high.  HR: 100  SPO2: 95% on room air    Patient offers no complaints.

## 2024-07-17 ENCOUNTER — HOSPITAL ENCOUNTER (EMERGENCY)
Facility: HOSPITAL | Age: 34
Discharge: HOME | End: 2024-07-17
Attending: EMERGENCY MEDICINE
Payer: COMMERCIAL

## 2024-07-17 VITALS
OXYGEN SATURATION: 96 % | BODY MASS INDEX: 31.26 KG/M2 | DIASTOLIC BLOOD PRESSURE: 94 MMHG | HEART RATE: 95 BPM | RESPIRATION RATE: 16 BRPM | TEMPERATURE: 98.2 F | SYSTOLIC BLOOD PRESSURE: 146 MMHG | WEIGHT: 160.05 LBS

## 2024-07-17 DIAGNOSIS — G89.18 POST-OP PAIN: ICD-10-CM

## 2024-07-17 DIAGNOSIS — K13.0 LIP ULCER: Primary | ICD-10-CM

## 2024-07-17 PROCEDURE — 99282 EMERGENCY DEPT VISIT SF MDM: CPT

## 2024-07-17 PROCEDURE — 99284 EMERGENCY DEPT VISIT MOD MDM: CPT | Performed by: PHYSICIAN ASSISTANT

## 2024-07-17 NOTE — ED NOTES
Pt discharged by ED provider Miriam. This RN not at the bedside.      Mecca Lee, RN  07/17/24 3702

## 2024-07-17 NOTE — DISCHARGE INSTRUCTIONS
Keep the outside of the lip moist with Vaseline.     Per [facility]- patient can be seen in the office tomorrow before the weekend. YOU NEED TO CALL THE OFFICE FIRST THING TOMORROW MORNING (7/18/24) TO SET UP APPOINTMENT

## 2024-07-17 NOTE — ED PROVIDER NOTES
History  Chief Complaint   Patient presents with    Dental Pain     Pt has had some swelling in R side of the mouth area. The onset has been on Friday. Pt's caregiver states that it has gotten worse.      Patient is a 34-year-old male with past medical history of autism, bipolar disorder, schizophrenia, cognitive impairment/intellectual disability, asthma, who is accompanied by his brother (who also is his caretaker) for evaluation of dental pain and facial swelling.  Brother states that patient had 4 teeth extracted on 7/11/2024 by [facility] oral surgery in [place].  Patient was on pain medication after surgery.  No antibiotics. They state after the surgery he had a small cut/red eugene to the right bottom lip. This seems to have worsened per brother. He also is complaining of right lower pain at the extraction sites and brother states there seems to be some swelling to the cheek as well. Patient is eating and drinking normally. No difficulty swallowing. No neck pain/stiffness. No bleeding. No fever, chills, nausea, vomiting. Has not followed up with oral surgery.         Prior to Admission Medications   Prescriptions Last Dose Informant Patient Reported? Taking?   Melatonin ER 10 MG TBCR   Yes No   Sig: Take 10 mg by mouth   QUEtiapine (SEROquel) 400 MG tablet   Yes No   Sig: Take 400 mg by mouth 2 (two) times a day    albuterol (2.5 mg/3 mL) 0.083 % nebulizer solution   No No   Sig: Take 3 mL (2.5 mg total) by nebulization every 6 (six) hours as needed for wheezing or shortness of breath   albuterol (PROVENTIL HFA,VENTOLIN HFA) 90 mcg/act inhaler   No No   Sig: Inhale 1-2 puffs every 6 (six) hours as needed for wheezing or shortness of breath   albuterol (Proventil HFA) 90 mcg/act inhaler   No No   Sig: Inhale 2 puffs every 6 (six) hours as needed for wheezing   divalproex sodium (DEPAKOTE ER) 500 mg 24 hr tablet   Yes No   Sig: Take 1,000 mg by mouth daily XR formula   fluticasone (FLONASE) 50 mcg/act nasal  "spray   No No   Si spray into each nostril daily   hydrocortisone 1 % ointment   No No   Sig: Apply 1 g (1 application total) topically 2 (two) times a day for 3 days   loratadine (CLARITIN) 10 mg tablet   No No   Sig: Take 1 tablet (10 mg total) by mouth daily      Facility-Administered Medications: None       Past Medical History:   Diagnosis Date    Asthma     Autism spectrum disorder     Bipolar 1 disorder (HCC)     Cognitive impairment     Depression     Intellectual functioning disability     Intestinal disaccharidase deficiency     Mood swings     Psychiatric illness     Schizo-affective schizophrenia (HCC)     Self-injurious behavior        Past Surgical History:   Procedure Laterality Date    FRACTURE SURGERY      SHOULDER SURGERY         History reviewed. No pertinent family history.  I have reviewed and agree with the history as documented.    E-Cigarette/Vaping    E-Cigarette Use Never User      E-Cigarette/Vaping Substances    Nicotine No     THC No     CBD No     Flavoring No     Other No     Unknown No      Social History     Tobacco Use    Smoking status: Every Day     Current packs/day: 0.50     Types: Cigarettes    Smokeless tobacco: Never   Vaping Use    Vaping status: Never Used   Substance Use Topics    Alcohol use: No    Drug use: Not Currently     Types: \"Crack\" cocaine, Cocaine       Review of Systems   Constitutional:  Negative for appetite change, chills and fever.   HENT:  Positive for dental problem and facial swelling. Negative for ear pain, sore throat and trouble swallowing.    Eyes:  Negative for pain and visual disturbance.   Respiratory:  Negative for cough and shortness of breath.    Cardiovascular:  Negative for chest pain.   Gastrointestinal:  Negative for abdominal pain and vomiting.   Genitourinary:  Negative for difficulty urinating.   Musculoskeletal:  Negative for arthralgias, back pain, neck pain and neck stiffness.   Skin:  Negative for color change.   Neurological:  " Negative for syncope and headaches.   All other systems reviewed and are negative.      Physical Exam  Physical Exam  Vitals and nursing note reviewed.   Constitutional:       General: He is not in acute distress.     Appearance: Normal appearance. He is well-developed. He is not ill-appearing, toxic-appearing or diaphoretic.   HENT:      Head: Normocephalic and atraumatic.      Right Ear: External ear normal.      Left Ear: External ear normal.      Nose: Nose normal.      Mouth/Throat:      Mouth: Mucous membranes are moist.        Comments: Patient with prior extractions noted x 4.   Two extractions to right lower molars (#30 and 28), mild gum erythema/swelling. No bleeding or drainage. Handles oral secretions well without difficulty. No sublingual swelling or pain. Very minimal overlying facial swelling without erythema, warmth, induration. No neck pain, swelling, stiffness.   No sign of edy's angina.   Right lower lip with ulcer. No surrounding signs of infection.   Pictures in media section of chart.   Eyes:      Conjunctiva/sclera: Conjunctivae normal.   Cardiovascular:      Rate and Rhythm: Normal rate and regular rhythm.      Heart sounds: Normal heart sounds. No murmur heard.  Pulmonary:      Effort: Pulmonary effort is normal. No respiratory distress.      Breath sounds: Normal breath sounds. No stridor. No wheezing, rhonchi or rales.   Abdominal:      General: Abdomen is flat. There is no distension.   Musculoskeletal:         General: No swelling.      Cervical back: Normal range of motion and neck supple. No rigidity or tenderness.   Lymphadenopathy:      Cervical: No cervical adenopathy.   Skin:     General: Skin is warm and dry.      Capillary Refill: Capillary refill takes less than 2 seconds.   Neurological:      Mental Status: He is alert.   Psychiatric:         Mood and Affect: Mood normal.         Vital Signs  ED Triage Vitals [07/17/24 1416]   Temperature Pulse Respirations Blood Pressure  "SpO2   98.2 °F (36.8 °C) 95 16 146/94 96 %      Temp Source Heart Rate Source Patient Position - Orthostatic VS BP Location FiO2 (%)   Oral Monitor Sitting Right arm --      Pain Score       6           Vitals:    07/17/24 1416   BP: 146/94   Pulse: 95   Patient Position - Orthostatic VS: Sitting         Visual Acuity      ED Medications  Medications - No data to display    Diagnostic Studies  Results Reviewed       None                   No orders to display              Procedures  Procedures         ED Course                                               Medical Decision Making  Discussed with [facility] on call Dr. Vasyl Gray who also viewed clinical images- \"That's just a big ulcer either from retraction from the surgery or biting afterwards from being numb or possibly even a combination of both. The outside of the lip I would keep moist with some Vaseline. Nothing looks infected, but we can see him in the office tomorrow before the weekend. They need to call first thing tomorrow morning. There is no note in [facility] as oral surgery outpatient is not part of epic.\"    Discussed plan with patient and caregiver. Given contact information of [facility]MS to call tomorrow morning to set up appointment for follow up tomorrow.   Caregiver states understanding and agrees with plan. Patient well appearing, non toxic and in NAD at time of discharge.                  Disposition  Final diagnoses:   Lip ulcer   Post-op pain     Time reflects when diagnosis was documented in both MDM as applicable and the Disposition within this note       Time User Action Codes Description Comment    7/17/2024  3:39 PM Ida Pineda [K13.0] Lip ulcer     7/17/2024  3:40 PM Ida Pineda [G89.18] Post-op pain           ED Disposition       ED Disposition   Discharge    Condition   Stable    Date/Time   Wed Jul 17, 2024  3:39 PM    Comment   Khai Hernández discharge to home/self care.                   Follow-up Information       Follow up " With Specialties Details Why Contact Info    BRANDEE Lee Nurse Practitioner Schedule an appointment as soon as possible for a visit   [address] [facility]  [place] [address]  [phone]      Vasyl Gray DMD Oral Maxillofacial Surgery Schedule an appointment as soon as possible for a visit   [address] [place] [address]  [place] [address]  [phone]      [facility] [place]  Call in 1 day Call the office tomororw morning to set up outpatient follow up appointment. Inform the office you were seen in the ED and require followup. [address] [place] [address]  [facility] [address]  [phone]            Discharge Medication List as of 7/17/2024  3:42 PM        CONTINUE these medications which have NOT CHANGED    Details   albuterol (2.5 mg/3 mL) 0.083 % nebulizer solution Take 3 mL (2.5 mg total) by nebulization every 6 (six) hours as needed for wheezing or shortness of breath, Starting Sun 11/21/2021, Normal      !! albuterol (Proventil HFA) 90 mcg/act inhaler Inhale 2 puffs every 6 (six) hours as needed for wheezing, Starting Wed 4/24/2024, Normal      !! albuterol (PROVENTIL HFA,VENTOLIN HFA) 90 mcg/act inhaler Inhale 1-2 puffs every 6 (six) hours as needed for wheezing or shortness of breath, Starting Fri 3/8/2024, Normal      divalproex sodium (DEPAKOTE ER) 500 mg 24 hr tablet Take 1,000 mg by mouth daily XR formula, Starting Wed 4/4/2012, Historical Med      fluticasone (FLONASE) 50 mcg/act nasal spray 1 spray into each nostril daily, Starting Mon 5/15/2023, Normal      hydrocortisone 1 % ointment Apply 1 g (1 application total) topically 2 (two) times a day for 3 days, Starting Wed 2/23/2022, Until Sat 2/26/2022, Normal      loratadine (CLARITIN) 10 mg tablet Take 1 tablet (10 mg total) by mouth daily, Starting Mon 5/15/2023, Normal      Melatonin ER 10 MG TBCR Take 10 mg by mouth, Historical Med      QUEtiapine (SEROquel) 400 MG tablet Take 400  mg by mouth 2 (two) times a day , Historical Med       !! - Potential duplicate medications found. Please discuss with provider.          No discharge procedures on file.    PDMP Review       None            ED Provider  Electronically Signed by             Ida Pineda PA-C  07/17/24 7790

## 2024-09-18 NOTE — ED NOTES
For Asthma:   Start Symbicort 80 mcg.  Use 1-2 puffs before exercise and 2 puffs every 4-6 hours as needed for increased asthma symptoms (age >/=12 up to 12 puffs per day, age </=11 up to 8 puffs per day).  SMART is a new way of approaching your asthma care that gives you more medicine when you need it most.  SMART stands for \"Single Maintenance and Reliever Therapy.\"  You will use just one inhaler for everyday asthma prevention and for acute symptoms.  You no longer need to remember which inhaler is which.  This change will enable you to get enough of the preventive medication to control your asthma long-term.  Ideally use the inhaler with a spacer and rinse your mouth after using.   Discontinue combivent respimat inhaler.   Continue albuterol & budesonide 0.25 mg nebs as needed.     For Nasal Congestion:  START loratadine (Claritin) 5 mL daily.  START Flonase nasal spray 1 spray per nostril daily.    For Itchy Skin:   After he bathes apply Vaseline to damp skin.  START applying triamcinolone cream to itchy areas as needed. Avoid her face/neck.    For Questions:  Radha Galeano DO, FACAAI Ruthann Peck, PA-C  Allergy and Immunology Clinic  [facility] [place] [facility] [place]  Phone [phone] (allergy clinic), [phone] (general [facility])  Fax [phone]  - Or use [employer] to contact our clinic - messages sent through [employer] go directly to our nursing staff inbox     See assessment done by ED provider.     Mecca Lee, RN  07/17/24 3670

## 2024-10-28 ENCOUNTER — HOSPITAL ENCOUNTER (EMERGENCY)
Facility: HOSPITAL | Age: 34
Discharge: HOME | End: 2024-10-28
Attending: EMERGENCY MEDICINE
Payer: COMMERCIAL

## 2024-10-28 VITALS
RESPIRATION RATE: 16 BRPM | WEIGHT: 167.99 LBS | DIASTOLIC BLOOD PRESSURE: 79 MMHG | HEART RATE: 102 BPM | OXYGEN SATURATION: 97 % | BODY MASS INDEX: 32.81 KG/M2 | TEMPERATURE: 98.1 F | SYSTOLIC BLOOD PRESSURE: 157 MMHG

## 2024-10-28 DIAGNOSIS — J20.9 ACUTE BRONCHITIS: Primary | ICD-10-CM

## 2024-10-28 LAB
FLUAV RNA RESP QL NAA+PROBE: NEGATIVE
FLUBV RNA RESP QL NAA+PROBE: NEGATIVE
RSV RNA RESP QL NAA+PROBE: NEGATIVE
SARS-COV-2 RNA RESP QL NAA+PROBE: NEGATIVE

## 2024-10-28 PROCEDURE — 99284 EMERGENCY DEPT VISIT MOD MDM: CPT | Performed by: EMERGENCY MEDICINE

## 2024-10-28 PROCEDURE — 0241U HB NFCT DS VIR RESP RNA 4 TRGT: CPT | Performed by: EMERGENCY MEDICINE

## 2024-10-28 PROCEDURE — 99283 EMERGENCY DEPT VISIT LOW MDM: CPT

## 2024-10-28 RX ORDER — DOXYCYCLINE 100 MG/1
100 CAPSULE ORAL 2 TIMES DAILY
Qty: 14 CAPSULE | Refills: 0 | Status: SHIPPED | OUTPATIENT
Start: 2024-10-28 | End: 2024-11-04

## 2024-10-28 RX ORDER — GUAIFENESIN 600 MG/1
1200 TABLET, EXTENDED RELEASE ORAL EVERY 12 HOURS SCHEDULED
Qty: 12 TABLET | Refills: 0 | Status: SHIPPED | OUTPATIENT
Start: 2024-10-28 | End: 2024-10-31

## 2024-10-28 RX ORDER — ALBUTEROL SULFATE 90 UG/1
2 INHALANT RESPIRATORY (INHALATION) EVERY 4 HOURS PRN
Qty: 18 G | Refills: 0 | Status: SHIPPED | OUTPATIENT
Start: 2024-10-28

## 2024-10-28 RX ORDER — PREDNISONE 20 MG/1
40 TABLET ORAL DAILY
Qty: 10 TABLET | Refills: 0 | Status: SHIPPED | OUTPATIENT
Start: 2024-10-28 | End: 2024-11-02

## 2024-10-28 RX ORDER — ALBUTEROL SULFATE 0.83 MG/ML
2.5 SOLUTION RESPIRATORY (INHALATION) EVERY 6 HOURS PRN
Qty: 75 ML | Refills: 0 | Status: SHIPPED | OUTPATIENT
Start: 2024-10-28

## 2024-10-28 NOTE — ED PROVIDER NOTES
Time reflects when diagnosis was documented in both MDM as applicable and the Disposition within this note       Time User Action Codes Description Comment    10/28/2024  9:04 AM Carlos Boyd Add [J20.9] Acute bronchitis           ED Disposition       ED Disposition   Discharge    Condition   Stable    Date/Time   Mon Oct 28, 2024  9:03 AM    Comment   Khai Hernández discharge to home/self care.                   Assessment & Plan       Medical Decision Making  Risk  OTC drugs.  Prescription drug management.      Patient with signs symptoms consistent with acute bronchitis.  I did explain to him that he needs to stop smoking.  This illness has been going on for at least a week so I suspect that it was most likely viral however being a smoker we will place him on antibiotics.  We will also refill his prescription for albuterol both through nebulizer and an inhaler as well as prednisone.  I do not believe he needs an x-ray as he has normal oxygen saturation and he is moving a lot of air and is no acute respiratory distress.  I do not believe he has pneumothorax or pleural effusion or congestive heart failure causing the symptoms.  He appears very well and is not in respiratory distress.  We will send a viral testing he has an active [employer] account.  Will discharge before that result.       Medications - No data to display    ED Risk Strat Scores                           SBIRT 20yo+      Flowsheet Row Most Recent Value   Initial Alcohol Screen: US AUDIT-C     1. How often do you have a drink containing alcohol? 0 Filed at: 10/28/2024 0909   2. How many drinks containing alcohol do you have on a typical day you are drinking?  0 Filed at: 10/28/2024 0909   3a. Male UNDER 65: How often do you have five or more drinks on one occasion? 0 Filed at: 10/28/2024 0909   Audit-C Score 0 Filed at: 10/28/2024 0909   RONN: How many times in the past year have you...    Used an illegal drug or used a prescription  "medication for non-medical reasons? Never Filed at: 10/28/2024 0909                            History of Present Illness       Chief Complaint   Patient presents with    Flu Symptoms     Pt reports congestion and cough for a couple weeks. Denies fevers       Past Medical History:   Diagnosis Date    Asthma     Autism spectrum disorder     Bipolar 1 disorder (HCC)     Cognitive impairment     Depression     Intellectual functioning disability     Intestinal disaccharidase deficiency     Mood swings     Psychiatric illness     Schizo-affective schizophrenia (HCC)     Self-injurious behavior       Past Surgical History:   Procedure Laterality Date    FRACTURE SURGERY      SHOULDER SURGERY        History reviewed. No pertinent family history.   Social History     Tobacco Use    Smoking status: Every Day     Current packs/day: 0.50     Types: Cigarettes    Smokeless tobacco: Never   Vaping Use    Vaping status: Never Used   Substance Use Topics    Alcohol use: No    Drug use: Not Currently     Types: \"Crack\" cocaine, Cocaine      E-Cigarette/Vaping    E-Cigarette Use Never User       E-Cigarette/Vaping Substances    Nicotine No     THC No     CBD No     Flavoring No     Other No     Unknown No       I have reviewed and agree with the history as documented.     HPI  Just and a cough for at least a week.  He is a smoker.  No fever.  He said nasal congestion.  He is not short of breath.  Does not have any more of his albuterol.  He has no chest pain.  There is no nausea or vomiting.  Review of Systems        Objective       ED Triage Vitals [10/28/24 0856]   Temperature Pulse Blood Pressure Respirations SpO2 Patient Position - Orthostatic VS   98.1 °F (36.7 °C) 102 157/79 16 97 % --      Temp Source Heart Rate Source BP Location FiO2 (%) Pain Score    Oral Monitor -- -- --      Vitals      Date and Time Temp Pulse SpO2 Resp BP Pain Score FACES Pain Rating User   10/28/24 0856 98.1 °F (36.7 °C) 102 97 % 16 157/79 -- -- JL "            Physical Exam  Vitals and nursing note reviewed.   Constitutional:       General: He is not in acute distress.     Appearance: Normal appearance. He is well-developed. He is not ill-appearing, toxic-appearing or diaphoretic.   HENT:      Head: Normocephalic and atraumatic.      Nose: Congestion present. No rhinorrhea.      Mouth/Throat:      Pharynx: Oropharynx is clear. Uvula midline. No oropharyngeal exudate or posterior oropharyngeal erythema.   Eyes:      General: No scleral icterus.     Conjunctiva/sclera: Conjunctivae normal.   Neck:      Thyroid: No thyroid mass.      Vascular: No JVD.   Cardiovascular:      Rate and Rhythm: Normal rate and regular rhythm.   Pulmonary:      Effort: Pulmonary effort is normal. No respiratory distress.      Breath sounds: No stridor. Wheezing present. No rhonchi or rales.      Comments: Mild end expiratory wheezes.  Abdominal:      General: There is no distension.      Palpations: Abdomen is soft. There is no mass.      Tenderness: There is no abdominal tenderness. There is no guarding or rebound.   Musculoskeletal:         General: No tenderness. Normal range of motion.      Cervical back: Normal range of motion and neck supple.   Lymphadenopathy:      Cervical: No cervical adenopathy.   Skin:     General: Skin is warm and dry.      Findings: No erythema or rash.   Neurological:      General: No focal deficit present.      Mental Status: He is alert.      Cranial Nerves: No cranial nerve deficit.      Sensory: No sensory deficit.      Motor: No weakness.   Psychiatric:         Mood and Affect: Mood normal.         Behavior: Behavior normal.         Results Reviewed       Procedure Component Value Units Date/Time    FLU/RSV/COVID - if FLU/RSV clinically relevant (2hr TAT) [[id number]]  (Normal) Collected: 10/28/24 0907    Lab Status: Final result Specimen: Nares from Nose Updated: 10/28/24 0955     SARS-CoV-2 Negative     INFLUENZA A PCR Negative     INFLUENZA B PCR  Negative     RSV PCR Negative    Narrative:      This test has been performed using the CoV-2/Flu/RSV plus assay on the [employer] GeneXpert platform. This test has been validated by the  and verified by the performing laboratory.     This test is designed to amplify and detect the following: nucleocapsid (N), envelope (E), and RNA-dependent RNA polymerase (RdRP) genes of the SARS-CoV-2 genome; matrix (M), basic polymerase (PB2), and acidic protein (PA) segments of the influenza A genome; matrix (M) and non-structural protein (NS) segments of the influenza B genome, and the nucleocapsid genes of RSV A and RSV B.     Positive results are indicative of the presence of Flu A, Flu B, RSV, and/or SARS-CoV-2 RNA. Positive results for SARS-CoV-2 or suspected novel influenza should be reported to state, local, or federal health departments according to local reporting requirements.      All results should be assessed in conjunction with clinical presentation and other laboratory markers for clinical management.     FOR PEDIATRIC PATIENTS - copy/paste COVID Guidelines URL to browser: [email or web address]               No orders to display       Procedures    ED Medication and Procedure Management   Prior to Admission Medications   Prescriptions Last Dose Informant Patient Reported? Taking?   Melatonin ER 10 MG TBCR   Yes No   Sig: Take 10 mg by mouth   QUEtiapine (SEROquel) 400 MG tablet   Yes No   Sig: Take 400 mg by mouth 2 (two) times a day    albuterol (2.5 mg/3 mL) 0.083 % nebulizer solution   No No   Sig: Take 3 mL (2.5 mg total) by nebulization every 6 (six) hours as needed for wheezing or shortness of breath   albuterol (PROVENTIL HFA,VENTOLIN HFA) 90 mcg/act inhaler   No No   Sig: Inhale 1-2 puffs every 6 (six) hours as needed for wheezing or shortness of breath   albuterol (Proventil HFA) 90 mcg/act inhaler   No No   Sig: Inhale 2 puffs every 6 (six)  hours as needed for wheezing   divalproex sodium (DEPAKOTE ER) 500 mg 24 hr tablet   Yes No   Sig: Take 1,000 mg by mouth daily XR formula   fluticasone (FLONASE) 50 mcg/act nasal spray   No No   Si spray into each nostril daily   hydrocortisone 1 % ointment   No No   Sig: Apply 1 g (1 application total) topically 2 (two) times a day for 3 days   loratadine (CLARITIN) 10 mg tablet   No No   Sig: Take 1 tablet (10 mg total) by mouth daily      Facility-Administered Medications: None     Discharge Medication List as of 10/28/2024  9:07 AM        START taking these medications    Details   !! albuterol (2.5 mg/3 mL) 0.083 % nebulizer solution Take 3 mL (2.5 mg total) by nebulization every 6 (six) hours as needed for wheezing or shortness of breath, Starting Mon 10/28/2024, Normal      !! albuterol (PROVENTIL HFA,VENTOLIN HFA) 90 mcg/act inhaler Inhale 2 puffs every 4 (four) hours as needed for wheezing, Starting Mon 10/28/2024, Normal      doxycycline hyclate (VIBRAMYCIN) 100 mg capsule Take 1 capsule (100 mg total) by mouth 2 (two) times a day for 7 days, Starting Mon 10/28/2024, Until Mon 2024, Normal      guaiFENesin (MUCINEX) 600 mg 12 hr tablet Take 2 tablets (1,200 mg total) by mouth every 12 (twelve) hours for 3 days, Starting Mon 10/28/2024, Until u 10/31/2024, Normal      predniSONE 20 mg tablet Take 2 tablets (40 mg total) by mouth daily for 5 days, Starting Mon 10/28/2024, Until Sat 2024, Normal       !! - Potential duplicate medications found. Please discuss with provider.        CONTINUE these medications which have NOT CHANGED    Details   !! albuterol (2.5 mg/3 mL) 0.083 % nebulizer solution Take 3 mL (2.5 mg total) by nebulization every 6 (six) hours as needed for wheezing or shortness of breath, Starting Sun 2021, Normal      !! albuterol (Proventil HFA) 90 mcg/act inhaler Inhale 2 puffs every 6 (six) hours as needed for wheezing, Starting 2024, Normal      !! albuterol  (PROVENTIL HFA,VENTOLIN HFA) 90 mcg/act inhaler Inhale 1-2 puffs every 6 (six) hours as needed for wheezing or shortness of breath, Starting Fri 3/8/2024, Normal      divalproex sodium (DEPAKOTE ER) 500 mg 24 hr tablet Take 1,000 mg by mouth daily XR formula, Starting Wed 4/4/2012, Historical Med      fluticasone (FLONASE) 50 mcg/act nasal spray 1 spray into each nostril daily, Starting Mon 5/15/2023, Normal      hydrocortisone 1 % ointment Apply 1 g (1 application total) topically 2 (two) times a day for 3 days, Starting Wed 2/23/2022, Until Sat 2/26/2022, Normal      loratadine (CLARITIN) 10 mg tablet Take 1 tablet (10 mg total) by mouth daily, Starting Mon 5/15/2023, Normal      Melatonin ER 10 MG TBCR Take 10 mg by mouth, Historical Med      QUEtiapine (SEROquel) 400 MG tablet Take 400 mg by mouth 2 (two) times a day , Historical Med       !! - Potential duplicate medications found. Please discuss with provider.        No discharge procedures on file.  ED SEPSIS DOCUMENTATION   Time reflects when diagnosis was documented in both MDM as applicable and the Disposition within this note       Time User Action Codes Description Comment    10/28/2024  9:04 AM Carlos Boyd Add [J20.9] Acute bronchitis                  Carlos Boyd MD  10/28/24 3981

## 2024-10-28 NOTE — DISCHARGE INSTRUCTIONS
Use your nebulizer/inhaler as directed.  You need to stop smoking.    COVID/FLU?RSV test will result in the next few hours on My Chart.

## 2024-11-29 NOTE — TREATMENT TEAM
03/20/21 0700   Team Meeting   Meeting Type Daily Rounds   Team Members Present   Team Members Present Physician;Nurse   Physician Team Member Dr Brandon Zimmerman Team Member [facility]   Patient/Family Present   Patient Present No   Patient's Family Present No     Daily Rounds: Pt new admission on [address] with SI and running into traffic  Brought in by police  Pt with autism and IDD  Reports living alone in apartment and has support staff most of the day  Reports feelind sad prior to admission  Pt signed 72 hour notice this morning at 0800  Denies SI currently and wanting to go home 
03/21/21 0700   Team Meeting   Meeting Type Daily Rounds   Team Members Present   Team Members Present Physician;Nurse   Physician Team Member Dr Yin Madison Team Member [facility]   Patient/Family Present   Patient Present No   Patient's Family Present No     Daily Rounds: Pt pacing yesterday, focused on wanting to go home  Denies SI   Punched wall lightly in the afternoon and later threw chair after phone call  Able to calm down quickly and denies need for PRN  Slept overnight  Plan for discharge Monday 
no

## 2025-02-09 ENCOUNTER — HOSPITAL ENCOUNTER (EMERGENCY)
Facility: HOSPITAL | Age: 35
Discharge: HOME | End: 2025-02-10
Attending: EMERGENCY MEDICINE
Payer: COMMERCIAL

## 2025-02-09 VITALS
WEIGHT: 170.86 LBS | DIASTOLIC BLOOD PRESSURE: 97 MMHG | HEART RATE: 116 BPM | SYSTOLIC BLOOD PRESSURE: 159 MMHG | TEMPERATURE: 99.9 F | RESPIRATION RATE: 20 BRPM | BODY MASS INDEX: 33.37 KG/M2 | OXYGEN SATURATION: 97 %

## 2025-02-09 DIAGNOSIS — J45.901 ASTHMA EXACERBATION: ICD-10-CM

## 2025-02-09 DIAGNOSIS — J06.9 VIRAL URI WITH COUGH: Primary | ICD-10-CM

## 2025-02-09 LAB
FLUAV AG UPPER RESP QL IA.RAPID: NEGATIVE
FLUBV AG UPPER RESP QL IA.RAPID: NEGATIVE
SARS-COV+SARS-COV-2 AG RESP QL IA.RAPID: NEGATIVE

## 2025-02-09 PROCEDURE — 99284 EMERGENCY DEPT VISIT MOD MDM: CPT

## 2025-02-09 PROCEDURE — 87804 INFLUENZA ASSAY W/OPTIC: CPT | Performed by: PHYSICIAN ASSISTANT

## 2025-02-09 PROCEDURE — 94640 AIRWAY INHALATION TREATMENT: CPT

## 2025-02-09 PROCEDURE — 87811 SARS-COV-2 COVID19 W/OPTIC: CPT | Performed by: PHYSICIAN ASSISTANT

## 2025-02-09 RX ORDER — IPRATROPIUM BROMIDE AND ALBUTEROL SULFATE 2.5; .5 MG/3ML; MG/3ML
3 SOLUTION RESPIRATORY (INHALATION) ONCE
Status: COMPLETED | OUTPATIENT
Start: 2025-02-09 | End: 2025-02-09

## 2025-02-09 RX ORDER — PREDNISONE 20 MG/1
40 TABLET ORAL ONCE
Status: COMPLETED | OUTPATIENT
Start: 2025-02-09 | End: 2025-02-09

## 2025-02-09 RX ADMIN — PREDNISONE 40 MG: 20 TABLET ORAL at 23:37

## 2025-02-09 RX ADMIN — IPRATROPIUM BROMIDE AND ALBUTEROL SULFATE 3 ML: .5; 3 SOLUTION RESPIRATORY (INHALATION) at 23:37

## 2025-02-10 ENCOUNTER — APPOINTMENT (EMERGENCY)
Dept: RADIOLOGY | Facility: HOSPITAL | Age: 35
End: 2025-02-10
Payer: COMMERCIAL

## 2025-02-10 PROCEDURE — 99284 EMERGENCY DEPT VISIT MOD MDM: CPT | Performed by: PHYSICIAN ASSISTANT

## 2025-02-10 PROCEDURE — 71046 X-RAY EXAM CHEST 2 VIEWS: CPT

## 2025-02-10 RX ORDER — PREDNISONE 20 MG/1
40 TABLET ORAL DAILY
Qty: 8 TABLET | Refills: 0 | Status: SHIPPED | OUTPATIENT
Start: 2025-02-10 | End: 2025-02-14

## 2025-02-10 RX ORDER — ALBUTEROL SULFATE 90 UG/1
2 INHALANT RESPIRATORY (INHALATION) EVERY 6 HOURS PRN
Qty: 8 G | Refills: 0 | Status: SHIPPED | OUTPATIENT
Start: 2025-02-10

## 2025-02-10 NOTE — ED PROVIDER NOTES
Time reflects when diagnosis was documented in both MDM as applicable and the Disposition within this note       Time User Action Codes Description Comment    2/10/2025 12:28 AM Marissa Chapman Add [J06.9] Viral URI with cough     2/10/2025 12:29 AM Marissa Chapman Add [J45.901] Asthma exacerbation           ED Disposition       ED Disposition   Discharge    Condition   Stable    Date/Time   Mon Feb 10, 2025 12:28 AM    Comment   Khai Betty discharge to home/self care.                   Assessment & Plan       Medical Decision Making      DDx including but not limited to: URI, bronchiolitis, bronchitis, PNA, viral illness, COVID 19, smoke inhalation.    Patient presenting to ED for evaluation of cough, shortness of breath and chest tightness.  Symptoms started today.  Cough worsened throughout the day today.  Patient uses albuterol inhaler and nebulizer at home without improvement of symptoms.  This evening while on his way home symptoms worsened and patient started to complain of shortness of breath.  Patient denies any CP.  He is well-appearing on exam.  He does have some expiratory wheezing noted at the bases on pulmonary exam.  He is mildly tachycardic.  Will treat symptomatically with DuoNeb and give prednisone, will order CXR for evaluation of acute cardiopulmonary normalities and retest for COVID/flu.    COVID and flu testing negative.  CXR without evidence of pneumonia.  Patient feels improved with DuoNeb here.  Will give prescription for prednisone for suspected asthma exacerbation.  Recommend follow-up with PCP.  Patient also requesting an albuterol inhaler refill, will send to the pharmacy at this time.    Prior to discharge, discharge instructions were discussed with patient at bedside. Patient was provided both verbal and written instructions. Patient is understanding of the discharge instructions and is agreeable to plan of care. Return precautions were discussed with patient bedside, patient  verbalized understanding of signs and symptoms that would necessitate return to the ED. All questions were answered. Patient was comfortable with the plan of care and discharged to home.     Dispo: discharge home with follow up to PCP. Patient stable, in no acute distress and non-toxic at discharge.    Problems Addressed:  Asthma exacerbation: acute illness or injury  Viral URI with cough: acute illness or injury    Amount and/or Complexity of Data Reviewed  Labs: ordered.  Radiology: ordered and independent interpretation performed.    Risk  Prescription drug management.             Medications   ipratropium-albuterol (DUO-NEB) 0.5-2.5 mg/3 mL inhalation solution 3 mL (3 mL Nebulization Given 2/9/25 2337)   predniSONE tablet 40 mg (40 mg Oral Given 2/9/25 2337)       ED Risk Strat Scores                          SBIRT 22yo+      Flowsheet Row Most Recent Value   Initial Alcohol Screen: US AUDIT-C     1. How often do you have a drink containing alcohol? 0 Filed at: 02/09/2025 2339   2. How many drinks containing alcohol do you have on a typical day you are drinking?  0 Filed at: 02/09/2025 2339   3a. Male UNDER 65: How often do you have five or more drinks on one occasion? 0 Filed at: 02/09/2025 2339   Audit-C Score 0 Filed at: 02/09/2025 2339   RONN: How many times in the past year have you...    Used an illegal drug or used a prescription medication for non-medical reasons? Never Filed at: 02/09/2025 2339                            History of Present Illness       Chief Complaint   Patient presents with    Cough     Pt reports dry cough since this morning, non-productive. Using inhaler and nebulizer at home with no relief. Hx asthma. Reports associated SOB. COVID + on Jan 24       Past Medical History:   Diagnosis Date    Asthma     Autism spectrum disorder     Bipolar 1 disorder (HCC)     Cognitive impairment     Depression     Intellectual functioning disability     Intestinal disaccharidase deficiency     Mood  "swings     Psychiatric illness     Schizo-affective schizophrenia (HCC)     Self-injurious behavior       Past Surgical History:   Procedure Laterality Date    FRACTURE SURGERY      SHOULDER SURGERY        History reviewed. No pertinent family history.   Social History     Tobacco Use    Smoking status: Every Day     Current packs/day: 0.50     Types: Cigarettes    Smokeless tobacco: Never   Vaping Use    Vaping status: Never Used   Substance Use Topics    Alcohol use: No    Drug use: Not Currently     Types: \"Crack\" cocaine, Cocaine      E-Cigarette/Vaping    E-Cigarette Use Never User       E-Cigarette/Vaping Substances    Nicotine No     THC No     CBD No     Flavoring No     Other No     Unknown No       I have reviewed and agree with the history as documented.     This is a 34-year-old male with history of asthma presenting to the ED for evaluation of cough and shortness of breath.  Patient started with cough earlier in the day that worsened throughout the day.  He started with shortness of breath this evening when in the car on the way home.  He used at home inhaler and nebulizer earlier in the day with no relief of symptoms.  He had COVID-positive on 1/24/2025 but symptoms did not improve from that time.  Cough today is described as dry and harsh.      History provided by:  Patient   used: No        Review of Systems   Constitutional:  Negative for chills and fever.   HENT:  Positive for congestion.    Respiratory:  Positive for cough, chest tightness and shortness of breath.    Cardiovascular:  Negative for chest pain.   All other systems reviewed and are negative.          Objective       ED Triage Vitals [02/09/25 2315]   Temperature Pulse Blood Pressure Respirations SpO2 Patient Position - Orthostatic VS   99.9 °F (37.7 °C) (!) 116 159/97 20 97 % Sitting      Temp Source Heart Rate Source BP Location FiO2 (%) Pain Score    Oral Monitor Right arm -- No Pain      Vitals      Date and " Time Temp Pulse SpO2 Resp BP Pain Score FACES Pain Rating User   02/09/25 2315 99.9 °F (37.7 °C) 116 97 % 20 159/97 No Pain -- ES            Physical Exam  Vitals reviewed.   Constitutional:       General: He is not in acute distress.     Appearance: Normal appearance. He is well-developed and well-groomed. He is not ill-appearing.   HENT:      Head: Normocephalic and atraumatic.      Right Ear: External ear normal.      Left Ear: External ear normal.      Nose: Nose normal.   Eyes:      General: No scleral icterus.     Conjunctiva/sclera: Conjunctivae normal.   Cardiovascular:      Rate and Rhythm: Regular rhythm. Tachycardia present.   Pulmonary:      Effort: Pulmonary effort is normal. No tachypnea, accessory muscle usage, respiratory distress or retractions.      Breath sounds: No stridor. Examination of the right-lower field reveals wheezing. Examination of the left-lower field reveals wheezing. Wheezing present.      Comments: Expiratory wheezing at the bases.  Abdominal:      General: There is no distension.   Musculoskeletal:         General: No deformity. Normal range of motion.      Cervical back: Normal range of motion.      Right lower leg: No edema.      Left lower leg: No edema.   Skin:     Coloration: Skin is not jaundiced or pale.      Findings: No lesion or rash.   Neurological:      Mental Status: He is alert and oriented to person, place, and time.   Psychiatric:         Mood and Affect: Mood normal.         Behavior: Behavior normal. Behavior is cooperative.         Results Reviewed       Procedure Component Value Units Date/Time    FLU/COVID Rapid Antigen (30 min. TAT) - Preferred screening test in ED [[id number]]  (Normal) Collected: 02/09/25 2336    Lab Status: Final result Specimen: Nares from Nose Updated: 02/09/25 6727     SARS COV Rapid Antigen Negative     Influenza A Rapid Antigen Negative     Influenza B Rapid Antigen Negative    Narrative:      This test has been performed using the  Quidel Jossie 2 FLU+SARS Antigen test under the Emergency Use Authorization (EUA). This test has been validated by the  and verified by the performing laboratory. The Jossie uses lateral flow immunofluorescent sandwich assay to detect SARS-COV, Influenza A and Influenza B Antigen.     The Quidel Jossie 2 SARS Antigen test does not differentiate between SARS-CoV and SARS-CoV-2.     Negative results are presumptive and may be confirmed with a molecular assay, if necessary, for patient management. Negative results do not rule out SARS-CoV-2 or influenza infection and should not be used as the sole basis for treatment or patient management decisions. A negative test result may occur if the level of antigen in a sample is below the limit of detection of this test.     Positive results are indicative of the presence of viral antigens, but do not rule out bacterial infection or co-infection with other viruses.     All test results should be used as an adjunct to clinical observations and other information available to the provider.    FOR PEDIATRIC PATIENTS - copy/paste COVID Guidelines URL to browser: [email or web address]            XR chest 2 views   ED Interpretation by Marissa Chapman PA-C (02/10 0018)   No acute cardiopulmonary disease as interpreted by me at this time.          Procedures    ED Medication and Procedure Management   Prior to Admission Medications   Prescriptions Last Dose Informant Patient Reported? Taking?   Melatonin ER 10 MG TBCR   Yes No   Sig: Take 10 mg by mouth   QUEtiapine (SEROquel) 400 MG tablet   Yes No   Sig: Take 400 mg by mouth 2 (two) times a day    albuterol (2.5 mg/3 mL) 0.083 % nebulizer solution   No No   Sig: Take 3 mL (2.5 mg total) by nebulization every 6 (six) hours as needed for wheezing or shortness of breath   albuterol (2.5 mg/3 mL) 0.083 % nebulizer solution   No No   Sig: Take 3 mL (2.5 mg total) by  nebulization every 6 (six) hours as needed for wheezing or shortness of breath   albuterol (PROVENTIL HFA,VENTOLIN HFA) 90 mcg/act inhaler   No No   Sig: Inhale 1-2 puffs every 6 (six) hours as needed for wheezing or shortness of breath   albuterol (PROVENTIL HFA,VENTOLIN HFA) 90 mcg/act inhaler   No No   Sig: Inhale 2 puffs every 4 (four) hours as needed for wheezing   albuterol (Proventil HFA) 90 mcg/act inhaler   No No   Sig: Inhale 2 puffs every 6 (six) hours as needed for wheezing   divalproex sodium (DEPAKOTE ER) 500 mg 24 hr tablet   Yes No   Sig: Take 1,000 mg by mouth daily XR formula   fluticasone (FLONASE) 50 mcg/act nasal spray   No No   Si spray into each nostril daily   hydrocortisone 1 % ointment   No No   Sig: Apply 1 g (1 application total) topically 2 (two) times a day for 3 days   loratadine (CLARITIN) 10 mg tablet   No No   Sig: Take 1 tablet (10 mg total) by mouth daily      Facility-Administered Medications: None     Discharge Medication List as of 2/10/2025 12:43 AM        START taking these medications    Details   !! albuterol (Proventil HFA) 90 mcg/act inhaler Inhale 2 puffs every 6 (six) hours as needed for wheezing, Starting Mon 2/10/2025, Normal      predniSONE 20 mg tablet Take 2 tablets (40 mg total) by mouth daily for 4 days, Starting Mon 2/10/2025, Until 2025, Normal       !! - Potential duplicate medications found. Please discuss with provider.        CONTINUE these medications which have NOT CHANGED    Details   !! albuterol (2.5 mg/3 mL) 0.083 % nebulizer solution Take 3 mL (2.5 mg total) by nebulization every 6 (six) hours as needed for wheezing or shortness of breath, Starting Sun 2021, Normal      !! albuterol (2.5 mg/3 mL) 0.083 % nebulizer solution Take 3 mL (2.5 mg total) by nebulization every 6 (six) hours as needed for wheezing or shortness of breath, Starting Mon 10/28/2024, Normal      !! albuterol (Proventil HFA) 90 mcg/act inhaler Inhale 2 puffs  every 6 (six) hours as needed for wheezing, Starting Wed 4/24/2024, Normal      !! albuterol (PROVENTIL HFA,VENTOLIN HFA) 90 mcg/act inhaler Inhale 1-2 puffs every 6 (six) hours as needed for wheezing or shortness of breath, Starting Fri 3/8/2024, Normal      !! albuterol (PROVENTIL HFA,VENTOLIN HFA) 90 mcg/act inhaler Inhale 2 puffs every 4 (four) hours as needed for wheezing, Starting Mon 10/28/2024, Normal      divalproex sodium (DEPAKOTE ER) 500 mg 24 hr tablet Take 1,000 mg by mouth daily XR formula, Starting Wed 4/4/2012, Historical Med      fluticasone (FLONASE) 50 mcg/act nasal spray 1 spray into each nostril daily, Starting Mon 5/15/2023, Normal      hydrocortisone 1 % ointment Apply 1 g (1 application total) topically 2 (two) times a day for 3 days, Starting Wed 2/23/2022, Until Sat 2/26/2022, Normal      loratadine (CLARITIN) 10 mg tablet Take 1 tablet (10 mg total) by mouth daily, Starting Mon 5/15/2023, Normal      Melatonin ER 10 MG TBCR Take 10 mg by mouth, Historical Med      QUEtiapine (SEROquel) 400 MG tablet Take 400 mg by mouth 2 (two) times a day , Historical Med       !! - Potential duplicate medications found. Please discuss with provider.        No discharge procedures on file.  ED SEPSIS DOCUMENTATION   Time reflects when diagnosis was documented in both MDM as applicable and the Disposition within this note       Time User Action Codes Description Comment    2/10/2025 12:28 AM Marissa Chapman [J06.9] Viral URI with cough     2/10/2025 12:29 AM Marissa Chapman [J45.901] Asthma exacerbation                  Marissa Chapman PA-C  02/10/25 0117

## 2025-02-10 NOTE — DISCHARGE INSTRUCTIONS
Take Tylenol or motrin if you develop a fever. Use as directed on the box.  Take Prednisone as prescribed to the pharmacy.  Use over the counter cold and flu medicine for cough and congestion. Recommend Zarbees.   Use Flonase or nasal saline spray for nasal congestion.  Try using honey and warm water or tea for sore throat and cough.    Follow up with your family doctor if symptoms do not improve over the next couple of days.

## 2025-04-25 ENCOUNTER — HOSPITAL ENCOUNTER (EMERGENCY)
Facility: HOSPITAL | Age: 35
Discharge: HOME | End: 2025-04-25
Attending: EMERGENCY MEDICINE
Payer: COMMERCIAL

## 2025-04-25 VITALS
OXYGEN SATURATION: 98 % | RESPIRATION RATE: 17 BRPM | BODY MASS INDEX: 32.46 KG/M2 | TEMPERATURE: 97.8 F | SYSTOLIC BLOOD PRESSURE: 139 MMHG | HEART RATE: 107 BPM | DIASTOLIC BLOOD PRESSURE: 90 MMHG | WEIGHT: 166.23 LBS

## 2025-04-25 DIAGNOSIS — Z76.0 ENCOUNTER FOR MEDICATION REFILL: ICD-10-CM

## 2025-04-25 DIAGNOSIS — Z00.8 ENCOUNTER FOR PSYCHOLOGICAL EVALUATION: Primary | ICD-10-CM

## 2025-04-25 LAB — ETHANOL EXG-MCNC: 0 MG/DL

## 2025-04-25 PROCEDURE — 99284 EMERGENCY DEPT VISIT MOD MDM: CPT | Performed by: EMERGENCY MEDICINE

## 2025-04-25 PROCEDURE — 99283 EMERGENCY DEPT VISIT LOW MDM: CPT

## 2025-04-25 PROCEDURE — 82075 ASSAY OF BREATH ETHANOL: CPT

## 2025-04-25 RX ORDER — ALBUTEROL SULFATE 90 UG/1
2 INHALANT RESPIRATORY (INHALATION) ONCE
Status: COMPLETED | OUTPATIENT
Start: 2025-04-25 | End: 2025-04-25

## 2025-04-25 RX ADMIN — ALBUTEROL SULFATE 2 PUFF: 90 AEROSOL, METERED RESPIRATORY (INHALATION) at 02:44

## 2025-04-25 NOTE — DISCHARGE INSTRUCTIONS
Please follow up with your psychiatrist or mental health counselor regarding the recent stressors you have been experiencing.     If you experience new or worsening symptoms, or thoughts of harming yourself or others, please return to the emergency department for evaluation.

## 2025-04-25 NOTE — ED ATTENDING ATTESTATION
4/25/2025  IMaury Jr, DO, saw and evaluated the patient. I have discussed the patient with the resident/non-physician practitioner and agree with the resident's/non-physician practitioner's findings, Plan of Care, and MDM as documented in the resident's/non-physician practitioner's note, except where noted. All available labs and Radiology studies were reviewed.  I was present for key portions of any procedure(s) performed by the resident/non-physician practitioner and I was immediately available to provide assistance.       At this point I agree with the current assessment done in the Emergency Department.  I have conducted an independent evaluation of this patient a history and physical is as follows:    I supervised the Advanced Practitioner.? I performed, in its entirety, the assessment and plan component of the visit.  I agree with the Advanced Practitioner's note with the following assessment and plan:      Depression without Suicidal Ideation, Intent or Plan   - Will have crisis evaluate  - Crisis evaluated.  No inpatient criteria.  Resources given to him and family.  Patient able to be discharged    History of asthma   - Patient complaining of some chest tightness after walking.  Will give a DuoNeb because he does have evidence of wheezing on exam.    Maury Real Jr, DO 04/25/25       ED Course         Critical Care Time  Procedures

## 2025-04-25 NOTE — ED PROVIDER NOTES
"Time reflects when diagnosis was documented in both MDM as applicable and the Disposition within this note       Time User Action Codes Description Comment    4/25/2025  2:39 AM Mikhail Velez [Z00.8] Encounter for psychological evaluation     4/25/2025  2:39 AM Mikhail Velez [Z76.0] Encounter for medication refill           ED Disposition       ED Disposition   Discharge    Condition   Stable    Date/Time   Fri Apr 25, 2025  2:39 AM    Comment   Khai Betty discharge to home/self care.                   Assessment & Plan       Medical Decision Making  Patient is a 35-year-old male presenting to the emergency department for psychiatric evaluation.  Patient got into an argument with his mother earlier this evening and wanted to take a walk and ended up coming to the emergency department.  His cousin/caregiver is at bedside.  Patient denies any suicidal or homicidal ideation.  He denies any visual or auditory hallucinations.  He states that he \"just need to take a break\".  Patient's cousin states he has established care with a psychiatrist outpatient.  Patient requests a refill of his albuterol prescription, but endorses no other complaints.    Will get alcohol breathalyzer and consult crisis team for assistance with disposition.    No indication for inpatient psychiatric treatment at this time. Patient's cousin at bedside states he has established care with a psychiatrist he sees weekly. Patient states he wants to go home and appears restless to leave, pacing the exam room. Patient is appropriate for discharge at this time. Patient provided with albuterol inhaler as he has run out of his prescription. Discussed with the patient and his cousin that he should follow up with his psychiatrist or mental health counselor within one week to discuss the recent stressors that he has been experiencing. Advised patient to return to the ED for reevaluation if he experiences any SI, HI, or auditory or visual " "hallucinations. Patient and his cousin understand and agree to the stated plan.     Amount and/or Complexity of Data Reviewed  Labs: ordered. Decision-making details documented in ED Course.    Risk  Prescription drug management.        ED Course as of 04/25/25 0451   Fri Apr 25, 2025   0214 EXTBreath Alcohol: 0.000       Medications   albuterol (PROVENTIL HFA,VENTOLIN HFA) inhaler 2 puff (2 puffs Inhalation Given 4/25/25 0244)       ED Risk Strat Scores                    No data recorded                            History of Present Illness       Chief Complaint   Patient presents with    Psychiatric Evaluation     Pt states family stressors at home and has hx of depression. When asked if pt has any thoughts of harming himself pt states \"a little bit\" Pt has hx of depression and states taking medications as prescribed. Pt requesting inpatient psych due to \"needing a break from his family\"       Past Medical History:   Diagnosis Date    Asthma     Autism spectrum disorder     Bipolar 1 disorder (HCC)     Cognitive impairment     Depression     Intellectual functioning disability     Intestinal disaccharidase deficiency     Mood swings     Psychiatric illness     Schizo-affective schizophrenia (HCC)     Self-injurious behavior       Past Surgical History:   Procedure Laterality Date    FRACTURE SURGERY      SHOULDER SURGERY        History reviewed. No pertinent family history.   Social History     Tobacco Use    Smoking status: Every Day     Current packs/day: 0.50     Types: Cigarettes    Smokeless tobacco: Never   Vaping Use    Vaping status: Never Used   Substance Use Topics    Alcohol use: No    Drug use: Not Currently     Types: \"Crack\" cocaine, Cocaine      E-Cigarette/Vaping    E-Cigarette Use Never User       E-Cigarette/Vaping Substances    Nicotine No     THC No     CBD No     Flavoring No     Other No     Unknown No       I have reviewed and agree with the history as documented.     Khai is a 35-year-old " "male with past medical history of bipolar disorder and autism presenting to the emergency department for psychiatric evaluation.  Patient's cousin/caregiver is at bedside.  Patient got into an argument earlier this evening with his mother.  He wanted to leave the house to see a friend and became upset that he was not allowed to.  Patient wanted to leave the house to calm down.  His cousin walked with him and followed him to the emergency department.  Patient states he \"just needed a break\".  He denies any suicidal or homicidal ideation.  The patient's cousin at bedside also denies that the patient has any SI or HI.  Patient denies any auditory or visual hallucinations.  Patient does follow with a psychiatrist and counselor.  Patient requests refill of his albuterol as he has a history of asthma and ran out of his prescription.      History provided by:  Patient   used: No    Psychiatric Evaluation  Presenting symptoms: no hallucinations and no suicidal thoughts    Associated symptoms: anxiety        Review of Systems   Constitutional: Negative.    HENT: Negative.     Eyes: Negative.    Respiratory: Negative.     Cardiovascular: Negative.    Gastrointestinal: Negative.    Genitourinary: Negative.    Musculoskeletal: Negative.    Neurological: Negative.    Psychiatric/Behavioral:  Negative for hallucinations and suicidal ideas. The patient is nervous/anxious.            Objective       ED Triage Vitals [04/25/25 0127]   Temperature Pulse Blood Pressure Respirations SpO2 Patient Position - Orthostatic VS   97.8 °F (36.6 °C) (!) 107 139/90 17 98 % Sitting      Temp Source Heart Rate Source BP Location FiO2 (%) Pain Score    Oral Monitor Right arm -- --      Vitals      Date and Time Temp Pulse SpO2 Resp BP Pain Score FACES Pain Rating User   04/25/25 0127 97.8 °F (36.6 °C) 107 98 % 17 139/90 -- -- ES            Physical Exam  Constitutional:       General: He is not in acute distress.     Appearance: " Normal appearance. He is not ill-appearing.   HENT:      Head: Normocephalic and atraumatic.      Right Ear: External ear normal.      Left Ear: External ear normal.      Nose: Nose normal.      Mouth/Throat:      Mouth: Mucous membranes are moist.   Eyes:      General:         Right eye: No discharge.         Left eye: No discharge.      Conjunctiva/sclera: Conjunctivae normal.   Cardiovascular:      Rate and Rhythm: Normal rate and regular rhythm.      Pulses: Normal pulses.   Pulmonary:      Effort: Pulmonary effort is normal.      Breath sounds: Normal breath sounds. No wheezing, rhonchi or rales.   Abdominal:      Palpations: Abdomen is soft.      Tenderness: There is no abdominal tenderness. There is no guarding or rebound.   Musculoskeletal:         General: Normal range of motion.      Cervical back: No tenderness.      Right lower leg: No edema.      Left lower leg: No edema.   Skin:     General: Skin is warm and dry.      Capillary Refill: Capillary refill takes less than 2 seconds.   Neurological:      General: No focal deficit present.      Mental Status: He is alert and oriented to person, place, and time.   Psychiatric:         Mood and Affect: Mood normal.         Behavior: Behavior normal.         Thought Content: Thought content does not include homicidal or suicidal ideation. Thought content does not include homicidal or suicidal plan.         Results Reviewed       Procedure Component Value Units Date/Time    POCT alcohol breath test [[id number]]  (Normal) Resulted: 04/25/25 0212    Lab Status: Final result Updated: 04/25/25 0213     EXTBreath Alcohol 0.000            No orders to display       Procedures    ED Medication and Procedure Management   Prior to Admission Medications   Prescriptions Last Dose Informant Patient Reported? Taking?   Melatonin ER 10 MG TBCR   Yes No   Sig: Take 10 mg by mouth   QUEtiapine (SEROquel) 400 MG tablet   Yes No   Sig: Take 400 mg by mouth 2 (two) times a day     albuterol (2.5 mg/3 mL) 0.083 % nebulizer solution   No No   Sig: Take 3 mL (2.5 mg total) by nebulization every 6 (six) hours as needed for wheezing or shortness of breath   albuterol (2.5 mg/3 mL) 0.083 % nebulizer solution   No No   Sig: Take 3 mL (2.5 mg total) by nebulization every 6 (six) hours as needed for wheezing or shortness of breath   albuterol (PROVENTIL HFA,VENTOLIN HFA) 90 mcg/act inhaler   No No   Sig: Inhale 1-2 puffs every 6 (six) hours as needed for wheezing or shortness of breath   albuterol (PROVENTIL HFA,VENTOLIN HFA) 90 mcg/act inhaler   No No   Sig: Inhale 2 puffs every 4 (four) hours as needed for wheezing   albuterol (Proventil HFA) 90 mcg/act inhaler   No No   Sig: Inhale 2 puffs every 6 (six) hours as needed for wheezing   albuterol (Proventil HFA) 90 mcg/act inhaler   No No   Sig: Inhale 2 puffs every 6 (six) hours as needed for wheezing   divalproex sodium (DEPAKOTE ER) 500 mg 24 hr tablet   Yes No   Sig: Take 1,000 mg by mouth daily XR formula   fluticasone (FLONASE) 50 mcg/act nasal spray   No No   Si spray into each nostril daily   hydrocortisone 1 % ointment   No No   Sig: Apply 1 g (1 application total) topically 2 (two) times a day for 3 days   loratadine (CLARITIN) 10 mg tablet   No No   Sig: Take 1 tablet (10 mg total) by mouth daily      Facility-Administered Medications: None     Discharge Medication List as of 2025  2:42 AM        CONTINUE these medications which have NOT CHANGED    Details   !! albuterol (2.5 mg/3 mL) 0.083 % nebulizer solution Take 3 mL (2.5 mg total) by nebulization every 6 (six) hours as needed for wheezing or shortness of breath, Starting Sun 2021, Normal      !! albuterol (2.5 mg/3 mL) 0.083 % nebulizer solution Take 3 mL (2.5 mg total) by nebulization every 6 (six) hours as needed for wheezing or shortness of breath, Starting Mon 10/28/2024, Normal      !! albuterol (Proventil HFA) 90 mcg/act inhaler Inhale 2 puffs every 6 (six) hours  as needed for wheezing, Starting Wed 4/24/2024, Normal      !! albuterol (Proventil HFA) 90 mcg/act inhaler Inhale 2 puffs every 6 (six) hours as needed for wheezing, Starting Mon 2/10/2025, Normal      !! albuterol (PROVENTIL HFA,VENTOLIN HFA) 90 mcg/act inhaler Inhale 1-2 puffs every 6 (six) hours as needed for wheezing or shortness of breath, Starting Fri 3/8/2024, Normal      !! albuterol (PROVENTIL HFA,VENTOLIN HFA) 90 mcg/act inhaler Inhale 2 puffs every 4 (four) hours as needed for wheezing, Starting Mon 10/28/2024, Normal      divalproex sodium (DEPAKOTE ER) 500 mg 24 hr tablet Take 1,000 mg by mouth daily XR formula, Starting Wed 4/4/2012, Historical Med      fluticasone (FLONASE) 50 mcg/act nasal spray 1 spray into each nostril daily, Starting Mon 5/15/2023, Normal      hydrocortisone 1 % ointment Apply 1 g (1 application total) topically 2 (two) times a day for 3 days, Starting Wed 2/23/2022, Until Sat 2/26/2022, Normal      loratadine (CLARITIN) 10 mg tablet Take 1 tablet (10 mg total) by mouth daily, Starting Mon 5/15/2023, Normal      Melatonin ER 10 MG TBCR Take 10 mg by mouth, Historical Med      QUEtiapine (SEROquel) 400 MG tablet Take 400 mg by mouth 2 (two) times a day , Historical Med       !! - Potential duplicate medications found. Please discuss with provider.        No discharge procedures on file.  ED SEPSIS DOCUMENTATION   Time reflects when diagnosis was documented in both MDM as applicable and the Disposition within this note       Time User Action Codes Description Comment    4/25/2025  2:39 AM Mikhail eVlez [Z00.8] Encounter for psychological evaluation     4/25/2025  2:39 AM Mikhail Velez [Z76.0] Encounter for medication refill                  Mikhail Velez PA-C  04/25/25 0452

## 2025-04-25 NOTE — ED NOTES
Patient lives in a single apartment in a supervised program that provides 24/7 staff to assist him. He is accompanied tonight by his cousin. Khai reports feeling lonely, sad and angry at his mother. His cousin assisted in providing information when [facility] asked Khai why he decided to come to the emergency room [place]. Reportedly Khai spent the day with a friend, and wanted her to spend the night with him. His mother intervened and explained that wasn't a good idea. A disagreement resulted, and Khai became angry at his mother and sad. Initially he thought he wanted to go into the hospital to get away from his mother, but he calmed down in the ED and got sleepy and decided he wanted to go home. There was no actual mental health issue. His cousin confirmed he has outpatient mental health providers who his staff and family help to ensure he sees as scheduled, and he takes his medications as scheduled and monitored by staff and family. He denied SI, HI, psychosis. [facility] asked if there was anything we could do to help him feel happier, and he asked for [email or web address] coloring sheets. [facility] and physician printed coloring sheets for him and gave them to him to take home. No other concerns or needs noted from a psychiatric perspective. He is unable to participate in a discharge safety plan, but reportedly has no history of unsafe behaviors and has 24/7 caretakers with him.

## 2025-05-27 ENCOUNTER — DOCUMENTATION (OUTPATIENT)
Dept: OTHER | Facility: HOSPITAL | Age: 35
End: 2025-05-27

## 2025-05-27 DIAGNOSIS — J06.9 VIRAL URI: Primary | ICD-10-CM

## 2025-05-27 RX ORDER — GUAIFENESIN/DEXTROMETHORPHAN 100-10MG/5
5 SYRUP ORAL 3 TIMES DAILY PRN
Qty: 237 ML | Refills: 0 | Status: SHIPPED | OUTPATIENT
Start: 2025-05-27